# Patient Record
Sex: MALE | Race: WHITE | Employment: OTHER | URBAN - NONMETROPOLITAN AREA
[De-identification: names, ages, dates, MRNs, and addresses within clinical notes are randomized per-mention and may not be internally consistent; named-entity substitution may affect disease eponyms.]

---

## 2017-01-26 ENCOUNTER — ANTI-COAG VISIT (OUTPATIENT)
Dept: OTHER | Age: 67
End: 2017-01-26

## 2017-01-26 VITALS
BODY MASS INDEX: 38.06 KG/M2 | HEART RATE: 76 BPM | HEIGHT: 72 IN | WEIGHT: 281 LBS | DIASTOLIC BLOOD PRESSURE: 80 MMHG | SYSTOLIC BLOOD PRESSURE: 140 MMHG | OXYGEN SATURATION: 98 %

## 2017-01-26 DIAGNOSIS — I48.19 PERSISTENT ATRIAL FIBRILLATION (HCC): ICD-10-CM

## 2017-01-26 LAB — POC INR: 1.9 (ref 0.8–1.2)

## 2017-01-26 PROCEDURE — G8427 DOCREV CUR MEDS BY ELIG CLIN: HCPCS | Performed by: NURSE PRACTITIONER

## 2017-01-26 PROCEDURE — 3017F COLORECTAL CA SCREEN DOC REV: CPT | Performed by: NURSE PRACTITIONER

## 2017-01-26 PROCEDURE — G8419 CALC BMI OUT NRM PARAM NOF/U: HCPCS | Performed by: NURSE PRACTITIONER

## 2017-01-26 PROCEDURE — 99213 OFFICE O/P EST LOW 20 MIN: CPT | Performed by: NURSE PRACTITIONER

## 2017-01-26 PROCEDURE — G8484 FLU IMMUNIZE NO ADMIN: HCPCS | Performed by: NURSE PRACTITIONER

## 2017-01-26 PROCEDURE — 85610 PROTHROMBIN TIME: CPT | Performed by: NURSE PRACTITIONER

## 2017-01-26 PROCEDURE — 1123F ACP DISCUSS/DSCN MKR DOCD: CPT | Performed by: NURSE PRACTITIONER

## 2017-01-26 PROCEDURE — 1036F TOBACCO NON-USER: CPT | Performed by: NURSE PRACTITIONER

## 2017-01-26 PROCEDURE — 4040F PNEUMOC VAC/ADMIN/RCVD: CPT | Performed by: NURSE PRACTITIONER

## 2017-01-26 ASSESSMENT — ENCOUNTER SYMPTOMS
CONSTIPATION: 0
SHORTNESS OF BREATH: 0
DIARRHEA: 0
BLOOD IN STOOL: 0

## 2017-01-30 ENCOUNTER — OFFICE VISIT (OUTPATIENT)
Dept: OTHER | Age: 67
End: 2017-01-30

## 2017-01-30 VITALS — BODY MASS INDEX: 38.52 KG/M2 | WEIGHT: 284 LBS

## 2017-01-30 DIAGNOSIS — E11.69 DIABETES MELLITUS TYPE 2 IN OBESE (HCC): Primary | ICD-10-CM

## 2017-01-30 DIAGNOSIS — E66.9 DIABETES MELLITUS TYPE 2 IN OBESE (HCC): Primary | ICD-10-CM

## 2017-01-30 PROCEDURE — G0108 DIAB MANAGE TRN  PER INDIV: HCPCS | Performed by: REGISTERED NURSE

## 2017-02-07 ENCOUNTER — OFFICE VISIT (OUTPATIENT)
Dept: OTHER | Age: 67
End: 2017-02-07

## 2017-02-07 DIAGNOSIS — E66.9 DIABETES MELLITUS TYPE 2 IN OBESE (HCC): Primary | ICD-10-CM

## 2017-02-07 DIAGNOSIS — E11.69 DIABETES MELLITUS TYPE 2 IN OBESE (HCC): Primary | ICD-10-CM

## 2017-02-07 PROCEDURE — G0109 DIAB MANAGE TRN IND/GROUP: HCPCS | Performed by: REGISTERED NURSE

## 2017-02-15 ENCOUNTER — ANTI-COAG VISIT (OUTPATIENT)
Dept: OTHER | Age: 67
End: 2017-02-15

## 2017-02-15 VITALS
DIASTOLIC BLOOD PRESSURE: 81 MMHG | BODY MASS INDEX: 38.79 KG/M2 | HEART RATE: 96 BPM | WEIGHT: 286 LBS | SYSTOLIC BLOOD PRESSURE: 143 MMHG

## 2017-02-15 DIAGNOSIS — I48.20 ATRIAL FIBRILLATION, CHRONIC (HCC): ICD-10-CM

## 2017-02-15 DIAGNOSIS — I48.19 PERSISTENT ATRIAL FIBRILLATION (HCC): ICD-10-CM

## 2017-02-15 LAB — POC INR: 2.2 (ref 0.8–1.2)

## 2017-02-15 PROCEDURE — G8427 DOCREV CUR MEDS BY ELIG CLIN: HCPCS | Performed by: NURSE PRACTITIONER

## 2017-02-15 PROCEDURE — G8417 CALC BMI ABV UP PARAM F/U: HCPCS | Performed by: NURSE PRACTITIONER

## 2017-02-15 PROCEDURE — 85610 PROTHROMBIN TIME: CPT | Performed by: NURSE PRACTITIONER

## 2017-02-15 PROCEDURE — 99999 PR OFFICE/OUTPT VISIT,PROCEDURE ONLY: CPT | Performed by: NURSE PRACTITIONER

## 2017-02-15 PROCEDURE — 4040F PNEUMOC VAC/ADMIN/RCVD: CPT | Performed by: NURSE PRACTITIONER

## 2017-02-15 PROCEDURE — 3017F COLORECTAL CA SCREEN DOC REV: CPT | Performed by: NURSE PRACTITIONER

## 2017-02-15 ASSESSMENT — ENCOUNTER SYMPTOMS
DIARRHEA: 0
CONSTIPATION: 0
SHORTNESS OF BREATH: 0
BLOOD IN STOOL: 0

## 2017-02-16 DIAGNOSIS — R39.198 DIFFICULTY URINATING: ICD-10-CM

## 2017-02-16 RX ORDER — TAMSULOSIN HYDROCHLORIDE 0.4 MG/1
0.4 CAPSULE ORAL DAILY
Qty: 90 CAPSULE | Refills: 3 | Status: SHIPPED | OUTPATIENT
Start: 2017-02-16 | End: 2018-02-03 | Stop reason: SDUPTHER

## 2017-03-01 ENCOUNTER — ANTI-COAG VISIT (OUTPATIENT)
Dept: OTHER | Age: 67
End: 2017-03-01

## 2017-03-01 VITALS
DIASTOLIC BLOOD PRESSURE: 76 MMHG | SYSTOLIC BLOOD PRESSURE: 132 MMHG | HEART RATE: 72 BPM | BODY MASS INDEX: 39.09 KG/M2 | WEIGHT: 288.2 LBS

## 2017-03-01 DIAGNOSIS — I48.19 PERSISTENT ATRIAL FIBRILLATION (HCC): ICD-10-CM

## 2017-03-01 DIAGNOSIS — I48.20 ATRIAL FIBRILLATION, CHRONIC (HCC): ICD-10-CM

## 2017-03-01 LAB — POC INR: 2 (ref 0.8–1.2)

## 2017-03-01 PROCEDURE — 99212 OFFICE O/P EST SF 10 MIN: CPT | Performed by: NURSE PRACTITIONER

## 2017-03-01 PROCEDURE — G8484 FLU IMMUNIZE NO ADMIN: HCPCS | Performed by: NURSE PRACTITIONER

## 2017-03-01 PROCEDURE — 1123F ACP DISCUSS/DSCN MKR DOCD: CPT | Performed by: NURSE PRACTITIONER

## 2017-03-01 PROCEDURE — G8427 DOCREV CUR MEDS BY ELIG CLIN: HCPCS | Performed by: NURSE PRACTITIONER

## 2017-03-01 PROCEDURE — G8417 CALC BMI ABV UP PARAM F/U: HCPCS | Performed by: NURSE PRACTITIONER

## 2017-03-01 PROCEDURE — 3017F COLORECTAL CA SCREEN DOC REV: CPT | Performed by: NURSE PRACTITIONER

## 2017-03-01 PROCEDURE — 85610 PROTHROMBIN TIME: CPT | Performed by: NURSE PRACTITIONER

## 2017-03-01 PROCEDURE — 4040F PNEUMOC VAC/ADMIN/RCVD: CPT | Performed by: NURSE PRACTITIONER

## 2017-03-01 PROCEDURE — 1036F TOBACCO NON-USER: CPT | Performed by: NURSE PRACTITIONER

## 2017-03-01 ASSESSMENT — ENCOUNTER SYMPTOMS
BLOOD IN STOOL: 0
SHORTNESS OF BREATH: 0
DIARRHEA: 0
CONSTIPATION: 0

## 2017-03-20 DIAGNOSIS — I48.19 PERSISTENT ATRIAL FIBRILLATION (HCC): Primary | ICD-10-CM

## 2017-03-22 ENCOUNTER — ANTI-COAG VISIT (OUTPATIENT)
Dept: OTHER | Age: 67
End: 2017-03-22

## 2017-03-22 VITALS
SYSTOLIC BLOOD PRESSURE: 142 MMHG | DIASTOLIC BLOOD PRESSURE: 69 MMHG | BODY MASS INDEX: 39.11 KG/M2 | WEIGHT: 288.4 LBS | HEART RATE: 90 BPM

## 2017-03-22 DIAGNOSIS — I48.19 PERSISTENT ATRIAL FIBRILLATION (HCC): ICD-10-CM

## 2017-03-22 LAB — POC INR: 2.4 (ref 0.8–1.2)

## 2017-03-22 ASSESSMENT — ENCOUNTER SYMPTOMS
SHORTNESS OF BREATH: 0
DIARRHEA: 0
CONSTIPATION: 0
BLOOD IN STOOL: 0

## 2017-03-30 ENCOUNTER — OFFICE VISIT (OUTPATIENT)
Dept: UROLOGY | Age: 67
End: 2017-03-30

## 2017-03-30 VITALS
WEIGHT: 282 LBS | SYSTOLIC BLOOD PRESSURE: 142 MMHG | BODY MASS INDEX: 38.19 KG/M2 | HEIGHT: 72 IN | DIASTOLIC BLOOD PRESSURE: 88 MMHG

## 2017-03-30 DIAGNOSIS — E66.9 DIABETES MELLITUS TYPE 2 IN OBESE (HCC): Primary | ICD-10-CM

## 2017-03-30 DIAGNOSIS — R35.0 FREQUENCY OF URINATION: Primary | ICD-10-CM

## 2017-03-30 DIAGNOSIS — R31.0 GROSS HEMATURIA: ICD-10-CM

## 2017-03-30 DIAGNOSIS — E11.69 DIABETES MELLITUS TYPE 2 IN OBESE (HCC): Primary | ICD-10-CM

## 2017-03-30 LAB
BILIRUBIN URINE: NEGATIVE
BLOOD URINE, POC: ABNORMAL
CHARACTER, URINE: CLEAR
COLOR, URINE: YELLOW
GLUCOSE URINE: 500 MG/DL
KETONES, URINE: NEGATIVE
LEUKOCYTE CLUMPS, URINE: ABNORMAL
NITRITE, URINE: NEGATIVE
PH, URINE: 7
POST VOID RESIDUAL (PVR): 0 ML
PROTEIN, URINE: ABNORMAL MG/DL
SPECIFIC GRAVITY, URINE: 1.02 (ref 1–1.03)
UROBILINOGEN, URINE: 0.2 EU/DL

## 2017-03-30 PROCEDURE — 51798 US URINE CAPACITY MEASURE: CPT | Performed by: NURSE PRACTITIONER

## 2017-03-30 PROCEDURE — G8427 DOCREV CUR MEDS BY ELIG CLIN: HCPCS | Performed by: NURSE PRACTITIONER

## 2017-03-30 PROCEDURE — 99213 OFFICE O/P EST LOW 20 MIN: CPT | Performed by: NURSE PRACTITIONER

## 2017-03-30 PROCEDURE — 4040F PNEUMOC VAC/ADMIN/RCVD: CPT | Performed by: NURSE PRACTITIONER

## 2017-03-30 PROCEDURE — G8417 CALC BMI ABV UP PARAM F/U: HCPCS | Performed by: NURSE PRACTITIONER

## 2017-03-30 PROCEDURE — 1123F ACP DISCUSS/DSCN MKR DOCD: CPT | Performed by: NURSE PRACTITIONER

## 2017-03-30 PROCEDURE — 81003 URINALYSIS AUTO W/O SCOPE: CPT | Performed by: NURSE PRACTITIONER

## 2017-03-30 PROCEDURE — 1036F TOBACCO NON-USER: CPT | Performed by: NURSE PRACTITIONER

## 2017-03-30 PROCEDURE — 3017F COLORECTAL CA SCREEN DOC REV: CPT | Performed by: NURSE PRACTITIONER

## 2017-03-30 PROCEDURE — G8484 FLU IMMUNIZE NO ADMIN: HCPCS | Performed by: NURSE PRACTITIONER

## 2017-04-01 LAB
ORGANISM: ABNORMAL
URINE CULTURE, ROUTINE: ABNORMAL

## 2017-04-06 ENCOUNTER — TELEPHONE (OUTPATIENT)
Dept: UROLOGY | Age: 67
End: 2017-04-06

## 2017-04-17 ENCOUNTER — PROCEDURE VISIT (OUTPATIENT)
Dept: UROLOGY | Age: 67
End: 2017-04-17

## 2017-04-17 ENCOUNTER — TELEPHONE (OUTPATIENT)
Dept: UROLOGY | Age: 67
End: 2017-04-17

## 2017-04-17 VITALS
HEIGHT: 72 IN | SYSTOLIC BLOOD PRESSURE: 122 MMHG | BODY MASS INDEX: 38.6 KG/M2 | DIASTOLIC BLOOD PRESSURE: 70 MMHG | WEIGHT: 285 LBS

## 2017-04-17 DIAGNOSIS — R31.0 GROSS HEMATURIA: Primary | ICD-10-CM

## 2017-04-17 LAB
BILIRUBIN URINE: NEGATIVE
BLOOD URINE, POC: NEGATIVE
CHARACTER, URINE: CLEAR
COLOR, URINE: YELLOW
GLUCOSE URINE: NEGATIVE MG/DL
KETONES, URINE: NEGATIVE
LEUKOCYTE CLUMPS, URINE: NEGATIVE
NITRITE, URINE: NEGATIVE
PH, URINE: 7.5
PROTEIN, URINE: NEGATIVE MG/DL
SPECIFIC GRAVITY, URINE: 1.02 (ref 1–1.03)
UROBILINOGEN, URINE: 1 EU/DL

## 2017-04-17 PROCEDURE — 81003 URINALYSIS AUTO W/O SCOPE: CPT | Performed by: UROLOGY

## 2017-04-17 PROCEDURE — 52281 CYSTOSCOPY AND TREATMENT: CPT | Performed by: UROLOGY

## 2017-04-17 PROCEDURE — 1036F TOBACCO NON-USER: CPT | Performed by: UROLOGY

## 2017-04-17 PROCEDURE — 99999 PR OFFICE/OUTPT VISIT,PROCEDURE ONLY: CPT | Performed by: UROLOGY

## 2017-04-19 ENCOUNTER — ANTI-COAG VISIT (OUTPATIENT)
Dept: OTHER | Age: 67
End: 2017-04-19

## 2017-04-19 VITALS
HEART RATE: 82 BPM | WEIGHT: 287.9 LBS | SYSTOLIC BLOOD PRESSURE: 142 MMHG | DIASTOLIC BLOOD PRESSURE: 90 MMHG | BODY MASS INDEX: 39.05 KG/M2

## 2017-04-19 DIAGNOSIS — I48.19 PERSISTENT ATRIAL FIBRILLATION (HCC): ICD-10-CM

## 2017-04-19 LAB — POC INR: 1.8 (ref 0.8–1.2)

## 2017-04-19 ASSESSMENT — ENCOUNTER SYMPTOMS
SHORTNESS OF BREATH: 0
BLOOD IN STOOL: 0
CONSTIPATION: 0
DIARRHEA: 0

## 2017-04-21 ENCOUNTER — TELEPHONE (OUTPATIENT)
Dept: UROLOGY | Age: 67
End: 2017-04-21

## 2017-04-25 ENCOUNTER — TELEPHONE (OUTPATIENT)
Dept: CARDIOLOGY | Age: 67
End: 2017-04-25

## 2017-04-25 ENCOUNTER — OFFICE VISIT (OUTPATIENT)
Dept: CARDIOLOGY | Age: 67
End: 2017-04-25

## 2017-04-25 VITALS
HEART RATE: 76 BPM | SYSTOLIC BLOOD PRESSURE: 124 MMHG | BODY MASS INDEX: 39.01 KG/M2 | DIASTOLIC BLOOD PRESSURE: 72 MMHG | HEIGHT: 72 IN | WEIGHT: 288 LBS

## 2017-04-25 DIAGNOSIS — I48.19 PERSISTENT ATRIAL FIBRILLATION (HCC): Primary | ICD-10-CM

## 2017-04-25 DIAGNOSIS — Z01.810 PREOP CARDIOVASCULAR EXAM: ICD-10-CM

## 2017-04-25 PROCEDURE — 4040F PNEUMOC VAC/ADMIN/RCVD: CPT | Performed by: INTERNAL MEDICINE

## 2017-04-25 PROCEDURE — 93000 ELECTROCARDIOGRAM COMPLETE: CPT | Performed by: INTERNAL MEDICINE

## 2017-04-25 PROCEDURE — G8427 DOCREV CUR MEDS BY ELIG CLIN: HCPCS | Performed by: INTERNAL MEDICINE

## 2017-04-25 PROCEDURE — 1123F ACP DISCUSS/DSCN MKR DOCD: CPT | Performed by: INTERNAL MEDICINE

## 2017-04-25 PROCEDURE — 99213 OFFICE O/P EST LOW 20 MIN: CPT | Performed by: INTERNAL MEDICINE

## 2017-04-25 PROCEDURE — 1036F TOBACCO NON-USER: CPT | Performed by: INTERNAL MEDICINE

## 2017-04-25 PROCEDURE — G8417 CALC BMI ABV UP PARAM F/U: HCPCS | Performed by: INTERNAL MEDICINE

## 2017-04-25 PROCEDURE — 3017F COLORECTAL CA SCREEN DOC REV: CPT | Performed by: INTERNAL MEDICINE

## 2017-04-26 ENCOUNTER — TELEPHONE (OUTPATIENT)
Dept: UROLOGY | Age: 67
End: 2017-04-26

## 2017-04-26 ENCOUNTER — ANTI-COAG VISIT (OUTPATIENT)
Dept: OTHER | Age: 67
End: 2017-04-26

## 2017-04-26 DIAGNOSIS — I48.19 PERSISTENT ATRIAL FIBRILLATION (HCC): ICD-10-CM

## 2017-05-10 ENCOUNTER — TELEPHONE (OUTPATIENT)
Dept: CARDIOLOGY | Age: 67
End: 2017-05-10

## 2017-05-11 ENCOUNTER — ANTI-COAG VISIT (OUTPATIENT)
Dept: OTHER | Age: 67
End: 2017-05-11

## 2017-05-11 ENCOUNTER — PROCEDURE VISIT (OUTPATIENT)
Dept: UROLOGY | Age: 67
End: 2017-05-11

## 2017-05-11 VITALS
WEIGHT: 285.9 LBS | HEART RATE: 87 BPM | SYSTOLIC BLOOD PRESSURE: 155 MMHG | DIASTOLIC BLOOD PRESSURE: 92 MMHG | BODY MASS INDEX: 38.78 KG/M2

## 2017-05-11 VITALS
DIASTOLIC BLOOD PRESSURE: 78 MMHG | SYSTOLIC BLOOD PRESSURE: 130 MMHG | BODY MASS INDEX: 38.33 KG/M2 | WEIGHT: 283 LBS | HEIGHT: 72 IN

## 2017-05-11 DIAGNOSIS — I48.19 PERSISTENT ATRIAL FIBRILLATION (HCC): ICD-10-CM

## 2017-05-11 DIAGNOSIS — N20.0 KIDNEY STONE: Primary | ICD-10-CM

## 2017-05-11 LAB
BILIRUBIN URINE: NEGATIVE
BLOOD URINE, POC: ABNORMAL
CHARACTER, URINE: CLEAR
COLOR, URINE: YELLOW
GLUCOSE URINE: NEGATIVE MG/DL
KETONES, URINE: NEGATIVE
LEUKOCYTE CLUMPS, URINE: ABNORMAL
NITRITE, URINE: NEGATIVE
PH, URINE: 6
POC INR: 2.1 (ref 0.8–1.2)
POC INR: NORMAL (ref 0.8–1.2)
PROTEIN, URINE: 30 MG/DL
SPECIFIC GRAVITY, URINE: 1.02 (ref 1–1.03)
UROBILINOGEN, URINE: 0.2 EU/DL

## 2017-05-11 PROCEDURE — 99999 PR OFFICE/OUTPT VISIT,PROCEDURE ONLY: CPT | Performed by: UROLOGY

## 2017-05-11 PROCEDURE — 1036F TOBACCO NON-USER: CPT | Performed by: UROLOGY

## 2017-05-11 PROCEDURE — 81003 URINALYSIS AUTO W/O SCOPE: CPT | Performed by: UROLOGY

## 2017-05-11 PROCEDURE — 52310 CYSTOSCOPY AND TREATMENT: CPT | Performed by: UROLOGY

## 2017-05-11 ASSESSMENT — ENCOUNTER SYMPTOMS
DIARRHEA: 0
SHORTNESS OF BREATH: 0
BLOOD IN STOOL: 0
CONSTIPATION: 0

## 2017-05-16 ENCOUNTER — TELEPHONE (OUTPATIENT)
Dept: OTHER | Age: 67
End: 2017-05-16

## 2017-05-17 ENCOUNTER — ANTI-COAG VISIT (OUTPATIENT)
Dept: OTHER | Age: 67
End: 2017-05-17

## 2017-05-17 VITALS
HEART RATE: 77 BPM | SYSTOLIC BLOOD PRESSURE: 139 MMHG | BODY MASS INDEX: 38.92 KG/M2 | WEIGHT: 287 LBS | DIASTOLIC BLOOD PRESSURE: 96 MMHG

## 2017-05-17 DIAGNOSIS — I48.19 PERSISTENT ATRIAL FIBRILLATION (HCC): ICD-10-CM

## 2017-05-17 LAB — POC INR: 2.1 (ref 0.8–1.2)

## 2017-05-17 ASSESSMENT — ENCOUNTER SYMPTOMS
BLOOD IN STOOL: 0
DIARRHEA: 0
SHORTNESS OF BREATH: 0
CONSTIPATION: 0

## 2017-05-30 ENCOUNTER — ANTI-COAG VISIT (OUTPATIENT)
Dept: OTHER | Age: 67
End: 2017-05-30

## 2017-05-30 VITALS
BODY MASS INDEX: 38.11 KG/M2 | SYSTOLIC BLOOD PRESSURE: 134 MMHG | WEIGHT: 281 LBS | DIASTOLIC BLOOD PRESSURE: 64 MMHG | HEART RATE: 75 BPM

## 2017-05-30 DIAGNOSIS — I48.19 PERSISTENT ATRIAL FIBRILLATION (HCC): ICD-10-CM

## 2017-05-30 LAB — POC INR: 1.5 (ref 0.8–1.2)

## 2017-05-30 ASSESSMENT — ENCOUNTER SYMPTOMS
SHORTNESS OF BREATH: 0
CONSTIPATION: 0
DIARRHEA: 0
BLOOD IN STOOL: 0

## 2017-05-31 ENCOUNTER — TELEPHONE (OUTPATIENT)
Dept: UROLOGY | Age: 67
End: 2017-05-31

## 2017-06-01 ENCOUNTER — ANTI-COAG VISIT (OUTPATIENT)
Dept: OTHER | Age: 67
End: 2017-06-01

## 2017-06-01 VITALS
SYSTOLIC BLOOD PRESSURE: 114 MMHG | WEIGHT: 278 LBS | DIASTOLIC BLOOD PRESSURE: 62 MMHG | BODY MASS INDEX: 37.7 KG/M2 | HEART RATE: 89 BPM

## 2017-06-01 DIAGNOSIS — N20.0 KIDNEY STONE: Primary | ICD-10-CM

## 2017-06-01 DIAGNOSIS — I48.19 PERSISTENT ATRIAL FIBRILLATION (HCC): ICD-10-CM

## 2017-06-01 LAB — POC INR: 2.8 (ref 0.8–1.2)

## 2017-06-01 ASSESSMENT — ENCOUNTER SYMPTOMS
BLOOD IN STOOL: 0
DIARRHEA: 0
CONSTIPATION: 0
SHORTNESS OF BREATH: 0

## 2017-06-05 RX ORDER — METOPROLOL TARTRATE 50 MG/1
TABLET, FILM COATED ORAL
Qty: 180 TABLET | Refills: 1 | Status: SHIPPED | OUTPATIENT
Start: 2017-06-05 | End: 2017-12-05 | Stop reason: SDUPTHER

## 2017-06-07 ENCOUNTER — ANTI-COAG VISIT (OUTPATIENT)
Dept: OTHER | Age: 67
End: 2017-06-07

## 2017-06-07 VITALS
HEART RATE: 73 BPM | SYSTOLIC BLOOD PRESSURE: 146 MMHG | DIASTOLIC BLOOD PRESSURE: 80 MMHG | BODY MASS INDEX: 38.52 KG/M2 | WEIGHT: 284 LBS

## 2017-06-07 DIAGNOSIS — I48.19 PERSISTENT ATRIAL FIBRILLATION (HCC): ICD-10-CM

## 2017-06-07 LAB — POC INR: 2.5 (ref 0.8–1.2)

## 2017-06-07 ASSESSMENT — ENCOUNTER SYMPTOMS
DIARRHEA: 0
CONSTIPATION: 0
BLOOD IN STOOL: 0
SHORTNESS OF BREATH: 0

## 2017-06-27 ENCOUNTER — TELEPHONE (OUTPATIENT)
Dept: UROLOGY | Age: 67
End: 2017-06-27

## 2017-06-28 ENCOUNTER — ANTI-COAG VISIT (OUTPATIENT)
Dept: OTHER | Age: 67
End: 2017-06-28

## 2017-06-28 VITALS
HEART RATE: 76 BPM | WEIGHT: 293.2 LBS | BODY MASS INDEX: 39.77 KG/M2 | DIASTOLIC BLOOD PRESSURE: 83 MMHG | SYSTOLIC BLOOD PRESSURE: 151 MMHG

## 2017-06-28 DIAGNOSIS — I48.19 PERSISTENT ATRIAL FIBRILLATION (HCC): ICD-10-CM

## 2017-06-28 LAB — POC INR: 2.4 (ref 0.8–1.2)

## 2017-06-28 ASSESSMENT — ENCOUNTER SYMPTOMS
BLOOD IN STOOL: 0
CONSTIPATION: 0
SHORTNESS OF BREATH: 0
DIARRHEA: 0

## 2017-07-05 ENCOUNTER — TELEPHONE (OUTPATIENT)
Dept: UROLOGY | Age: 67
End: 2017-07-05

## 2017-07-06 ENCOUNTER — NURSE TRIAGE (OUTPATIENT)
Dept: ADMINISTRATIVE | Age: 67
End: 2017-07-06

## 2017-07-07 ENCOUNTER — TELEPHONE (OUTPATIENT)
Dept: UROLOGY | Age: 67
End: 2017-07-07

## 2017-07-07 DIAGNOSIS — R35.0 URINARY FREQUENCY: Primary | ICD-10-CM

## 2017-07-10 ENCOUNTER — NURSE ONLY (OUTPATIENT)
Dept: UROLOGY | Age: 67
End: 2017-07-10

## 2017-07-10 VITALS
BODY MASS INDEX: 38.6 KG/M2 | DIASTOLIC BLOOD PRESSURE: 68 MMHG | HEIGHT: 72 IN | SYSTOLIC BLOOD PRESSURE: 134 MMHG | WEIGHT: 285 LBS

## 2017-07-10 DIAGNOSIS — R39.15 URINARY URGENCY: ICD-10-CM

## 2017-07-10 DIAGNOSIS — R35.0 URINARY FREQUENCY: ICD-10-CM

## 2017-07-10 DIAGNOSIS — N39.0 URINARY TRACT INFECTION, SITE UNSPECIFIED: Primary | ICD-10-CM

## 2017-07-10 LAB
BILIRUBIN URINE: ABNORMAL
BLOOD URINE, POC: ABNORMAL
CHARACTER, URINE: CLEAR
COLOR, URINE: YELLOW
GLUCOSE URINE: NEGATIVE MG/DL
KETONES, URINE: NEGATIVE
LEUKOCYTE CLUMPS, URINE: ABNORMAL
NITRITE, URINE: POSITIVE
PH, URINE: 6
PROTEIN, URINE: 100 MG/DL
SPECIFIC GRAVITY, URINE: 1.02 (ref 1–1.03)
UROBILINOGEN, URINE: 0.2 EU/DL

## 2017-07-10 PROCEDURE — 99213 OFFICE O/P EST LOW 20 MIN: CPT | Performed by: NURSE PRACTITIONER

## 2017-07-10 PROCEDURE — 81003 URINALYSIS AUTO W/O SCOPE: CPT | Performed by: NURSE PRACTITIONER

## 2017-07-10 RX ORDER — CIPROFLOXACIN 500 MG/1
500 TABLET, FILM COATED ORAL 2 TIMES DAILY
Qty: 20 TABLET | Refills: 0 | Status: SHIPPED | OUTPATIENT
Start: 2017-07-10 | End: 2017-07-20

## 2017-07-12 LAB
ORGANISM: ABNORMAL
URINE CULTURE, ROUTINE: ABNORMAL

## 2017-08-02 ENCOUNTER — HOSPITAL ENCOUNTER (OUTPATIENT)
Dept: PHARMACY | Age: 67
Setting detail: THERAPIES SERIES
Discharge: HOME | End: 2017-08-02
Payer: MEDICARE

## 2017-08-02 VITALS
DIASTOLIC BLOOD PRESSURE: 72 MMHG | WEIGHT: 293 LBS | SYSTOLIC BLOOD PRESSURE: 143 MMHG | HEART RATE: 90 BPM | BODY MASS INDEX: 39.74 KG/M2

## 2017-08-02 DIAGNOSIS — I48.19 PERSISTENT ATRIAL FIBRILLATION (HCC): ICD-10-CM

## 2017-08-02 LAB — POC INR: 2.2 (ref 0.8–1.2)

## 2017-08-02 PROCEDURE — 99211 OFF/OP EST MAY X REQ PHY/QHP: CPT

## 2017-08-02 PROCEDURE — 85610 PROTHROMBIN TIME: CPT

## 2017-08-02 PROCEDURE — 36416 COLLJ CAPILLARY BLOOD SPEC: CPT

## 2017-08-02 ASSESSMENT — ENCOUNTER SYMPTOMS
SHORTNESS OF BREATH: 0
BLOOD IN STOOL: 0
DIARRHEA: 0
CONSTIPATION: 0

## 2017-08-30 ENCOUNTER — HOSPITAL ENCOUNTER (OUTPATIENT)
Dept: PHARMACY | Age: 67
Setting detail: THERAPIES SERIES
Discharge: HOME | End: 2017-08-30
Payer: MEDICARE

## 2017-08-30 VITALS
HEART RATE: 93 BPM | SYSTOLIC BLOOD PRESSURE: 152 MMHG | DIASTOLIC BLOOD PRESSURE: 79 MMHG | BODY MASS INDEX: 39.47 KG/M2 | WEIGHT: 291 LBS

## 2017-08-30 DIAGNOSIS — I48.19 PERSISTENT ATRIAL FIBRILLATION (HCC): ICD-10-CM

## 2017-08-30 LAB — POC INR: 3 (ref 0.8–1.2)

## 2017-08-30 PROCEDURE — 36416 COLLJ CAPILLARY BLOOD SPEC: CPT

## 2017-08-30 PROCEDURE — 99211 OFF/OP EST MAY X REQ PHY/QHP: CPT

## 2017-08-30 PROCEDURE — 85610 PROTHROMBIN TIME: CPT

## 2017-08-30 ASSESSMENT — ENCOUNTER SYMPTOMS
SHORTNESS OF BREATH: 0
DIARRHEA: 0
BLOOD IN STOOL: 0
CONSTIPATION: 0

## 2017-09-27 ENCOUNTER — HOSPITAL ENCOUNTER (OUTPATIENT)
Dept: PHARMACY | Age: 67
Setting detail: THERAPIES SERIES
Discharge: HOME | End: 2017-09-27
Payer: MEDICARE

## 2017-09-27 VITALS
DIASTOLIC BLOOD PRESSURE: 74 MMHG | SYSTOLIC BLOOD PRESSURE: 149 MMHG | WEIGHT: 296.4 LBS | BODY MASS INDEX: 40.2 KG/M2 | HEART RATE: 70 BPM

## 2017-09-27 DIAGNOSIS — I48.19 PERSISTENT ATRIAL FIBRILLATION (HCC): ICD-10-CM

## 2017-09-27 LAB — POC INR: 2.4 (ref 0.8–1.2)

## 2017-09-27 PROCEDURE — 99211 OFF/OP EST MAY X REQ PHY/QHP: CPT

## 2017-09-27 PROCEDURE — 36416 COLLJ CAPILLARY BLOOD SPEC: CPT

## 2017-09-27 PROCEDURE — 85610 PROTHROMBIN TIME: CPT

## 2017-09-27 ASSESSMENT — ENCOUNTER SYMPTOMS
BLOOD IN STOOL: 0
CONSTIPATION: 0
SHORTNESS OF BREATH: 0
DIARRHEA: 0

## 2017-10-17 ENCOUNTER — OFFICE VISIT (OUTPATIENT)
Dept: UROLOGY | Age: 67
End: 2017-10-17
Payer: MEDICARE

## 2017-10-17 VITALS
HEIGHT: 72 IN | WEIGHT: 300.4 LBS | DIASTOLIC BLOOD PRESSURE: 87 MMHG | SYSTOLIC BLOOD PRESSURE: 145 MMHG | BODY MASS INDEX: 40.69 KG/M2

## 2017-10-17 DIAGNOSIS — N39.0 RECURRENT UTI: Primary | ICD-10-CM

## 2017-10-17 LAB
BILIRUBIN URINE: NEGATIVE
BLOOD URINE, POC: NORMAL
CHARACTER, URINE: CLEAR
COLOR, URINE: YELLOW
GLUCOSE URINE: NEGATIVE MG/DL
KETONES, URINE: NEGATIVE
LEUKOCYTE CLUMPS, URINE: NEGATIVE
NITRITE, URINE: NEGATIVE
PH, URINE: 7
PROTEIN, URINE: NORMAL MG/DL
SPECIFIC GRAVITY, URINE: 1.02 (ref 1–1.03)
UROBILINOGEN, URINE: 0.2 EU/DL

## 2017-10-17 PROCEDURE — 4040F PNEUMOC VAC/ADMIN/RCVD: CPT | Performed by: NURSE PRACTITIONER

## 2017-10-17 PROCEDURE — 81003 URINALYSIS AUTO W/O SCOPE: CPT | Performed by: NURSE PRACTITIONER

## 2017-10-17 PROCEDURE — G8484 FLU IMMUNIZE NO ADMIN: HCPCS | Performed by: NURSE PRACTITIONER

## 2017-10-17 PROCEDURE — 99213 OFFICE O/P EST LOW 20 MIN: CPT | Performed by: NURSE PRACTITIONER

## 2017-10-17 PROCEDURE — G8417 CALC BMI ABV UP PARAM F/U: HCPCS | Performed by: NURSE PRACTITIONER

## 2017-10-17 PROCEDURE — 1123F ACP DISCUSS/DSCN MKR DOCD: CPT | Performed by: NURSE PRACTITIONER

## 2017-10-17 PROCEDURE — 3017F COLORECTAL CA SCREEN DOC REV: CPT | Performed by: NURSE PRACTITIONER

## 2017-10-17 PROCEDURE — G8427 DOCREV CUR MEDS BY ELIG CLIN: HCPCS | Performed by: NURSE PRACTITIONER

## 2017-10-17 PROCEDURE — 1036F TOBACCO NON-USER: CPT | Performed by: NURSE PRACTITIONER

## 2017-10-17 NOTE — PROGRESS NOTES
Allergies       finasteride (PROSCAR) 5 MG tablet   Take 5 mg by mouth daily Prostate      Sodium Chloride-Sodium Bicarb (CLASSIC NETI POT SINUS WASH) 2300-700 MG KIT by Nasal route as needed Indications: Congestion of the Nasal Sinuses  1 kit     hydrochlorothiazide (HYDRODIURIL) 25 MG tablet Take 25 mg by mouth daily. Indications: High Blood Pressure      digoxin (LANOXIN) 0.25 MG tablet Take by mouth daily Indications: Congestive Heart Failure 1/2 tab      lovastatin (MEVACOR) 20 MG tablet Take 20 mg by mouth nightly. Indications: High Amount of Fats in the Blood       No current facility-administered medications for this visit. Past Medical History  Meghan Renee  has a past medical history of Arthritis; Atrial fibrillation ([facility] Cancer ([facility] Diabetes mellitus ([facility] History of esophagogastroduodenoscopy (EGD); History of kidney stones; Hyperlipidemia; and Hypertension. Past Surgical History  The patient  has a past surgical history that includes knee surgery (1988???); Rotator cuff repair (2000?? ); Lithotripsy (1990?? -  2016??); other surgical history (5/26/2016); Cardiac catheterization (1995??); Cholecystectomy (1984??); Colonoscopy; skin biopsy; Cystoscopy (05/02/2017); Ureteroscopy (Left, 05/02/2017); and Kidney stone surgery (Left, 05/02/2017). Family History  This patient's family history includes Arthritis in his mother; Diabetes in his father and mother; Heart Disease in his mother; High Blood Pressure in his father and mother; Stroke in his mother. Social History  Meghan Renee  reports that he has never smoked. He has never used smokeless tobacco. He reports that he does not drink alcohol or use drugs. Subjective:     Review of Systems  No problems with ears, nose or throat. No problems with eyes. No chest pain, shortness of breath, abdominal pain, extremity pain or weakness, and no neurological deficits. No rashes.  symptoms per HPI.   The remainder of the review of symptoms is

## 2017-10-31 ENCOUNTER — OFFICE VISIT (OUTPATIENT)
Dept: CARDIOLOGY CLINIC | Age: 67
End: 2017-10-31
Payer: MEDICARE

## 2017-10-31 VITALS
HEART RATE: 64 BPM | HEIGHT: 72 IN | WEIGHT: 297 LBS | DIASTOLIC BLOOD PRESSURE: 86 MMHG | BODY MASS INDEX: 40.23 KG/M2 | SYSTOLIC BLOOD PRESSURE: 136 MMHG

## 2017-10-31 DIAGNOSIS — I48.20 ATRIAL FIBRILLATION, CHRONIC (HCC): ICD-10-CM

## 2017-10-31 PROCEDURE — G8484 FLU IMMUNIZE NO ADMIN: HCPCS | Performed by: INTERNAL MEDICINE

## 2017-10-31 PROCEDURE — 1123F ACP DISCUSS/DSCN MKR DOCD: CPT | Performed by: INTERNAL MEDICINE

## 2017-10-31 PROCEDURE — G8427 DOCREV CUR MEDS BY ELIG CLIN: HCPCS | Performed by: INTERNAL MEDICINE

## 2017-10-31 PROCEDURE — G8417 CALC BMI ABV UP PARAM F/U: HCPCS | Performed by: INTERNAL MEDICINE

## 2017-10-31 PROCEDURE — 99213 OFFICE O/P EST LOW 20 MIN: CPT | Performed by: INTERNAL MEDICINE

## 2017-10-31 PROCEDURE — 3017F COLORECTAL CA SCREEN DOC REV: CPT | Performed by: INTERNAL MEDICINE

## 2017-10-31 PROCEDURE — 1036F TOBACCO NON-USER: CPT | Performed by: INTERNAL MEDICINE

## 2017-10-31 PROCEDURE — 4040F PNEUMOC VAC/ADMIN/RCVD: CPT | Performed by: INTERNAL MEDICINE

## 2017-10-31 RX ORDER — WARFARIN SODIUM 2.5 MG/1
TABLET ORAL
Qty: 90 TABLET | Refills: 3 | Status: CANCELLED | OUTPATIENT
Start: 2017-10-31

## 2017-10-31 RX ORDER — WARFARIN SODIUM 2.5 MG/1
2.5 TABLET ORAL EVERY EVENING
COMMUNITY
End: 2017-12-14 | Stop reason: SDUPTHER

## 2017-10-31 NOTE — PROGRESS NOTES
Paul Persaud is a 75 y.o. male is here for  atrial fibrillation doing very well has history of syncopal episode and does not do well with vasodilators and has had no chest pains . Intensity of the pain none  provocation of the pain n one  what relieves the pain none  where does  the pain migrates to none and no nausea no fever and chills and no sob with and without activity.  No evidence of swelling in legs no palpitations and no syncopal episodes and no dizziness ,no bleeding ,or urination  Problems ,no double visions ,no speech problems and no bowel problems or diarrhea and tolerating medications     Patient Active Problem List   Diagnosis    GI bleed not requiring more than 4 units of blood in 24 hours, ICU, or surgery    Osteoarthrosis of hip    Diabetes mellitus type 2 in obese ([facility]    Current use of long term anticoagulation    Essential hypertension, benign    Hyperlipidemia with target LDL less than 70    Chronic nonallergic rhinitis    Kidney stone    Vasovagal syncope    Persistent atrial fibrillation (HCC)    Preop cardiovascular exam     Past Medical History:   Diagnosis Date    Arthritis     Atrial fibrillation ([facility]     Cancer ([facility]     skin    Diabetes mellitus ([facility]     History of esophagogastroduodenoscopy (EGD) 6/7/16    History of kidney stones     Hyperlipidemia     Hypertension      Social History   Substance Use Topics    Smoking status: Never Smoker    Smokeless tobacco: Never Used    Alcohol use No     Allergies   Allergen Reactions    Sulfa Antibiotics Rash     Current Outpatient Prescriptions   Medication Sig Dispense Refill    warfarin (COUMADIN) 2.5 MG tablet Take 2.5 mg by mouth every evening      metoprolol tartrate (LOPRESSOR) 50 MG tablet take 1 tablet by mouth twice a day 180 tablet 1    docusate sodium (COLACE) 100 MG capsule Take 1 capsule by mouth daily as needed for Constipation 30 capsule 1    tamsulosin (FLOMAX) 0.4 MG capsule Take 1 capsule by absent   Thyroid palpation appears to be  Normal    FRANKY  And evaluated and within normal limits  And size of pupils is normal  HEENT  teeth appears to be symmetrical without poor dentition and gums  and palate appears to be healthy and  head is normal cephalic  Without signs of trauma and eyes are without trauma no conjunctiva and Iids retracting and not retracted ptosis and no ptosis and without  erythematous changes and  Nose is symmetrical  without drainage  and ears are  without tinnitus  and throat is clear   JUGULAR VEINS  are not elevated and tongue is mid line no masses presence and no li A waves present   LYMPHATICS are without adenopathies at least on exam   CHEST  No biventricular heaves and thrills and no right ventricular heaves and examination without signs of trauma and lesions  HEART not distant and ir regular rate and rhythm without   gallop signs and and no murmurs and systolic crescendo  Decrescendo  normal s1 and s2 sounds and no s3 and s4 split   Point of maximum intensity of the heartbeat  is at or displaced from the fifth intercostal space  LUNGS no   presence of intercostal retractions and no  use of the accessory muscles with a diaphragmatic movement present and not present pectus and pigeon chest and without wheezes and rhonchi and non diminished in all posterior lungs  and without rales  ABDOMEN abdominal bruit not present  And  No  Presency of  morbid obesity and with no    non distended without organomegaly and no rebound tenderness and bowel sound are present  all quadrants   NEUROLOGY all the cranial nerves are intact and no motor deficits  and no sensory deficits  MUSCULAR SKELETAL without signs of trauma and kyphosis and scoliosis and normal range of motion for all extremities  INTEGUMENTARY without tenting and skin rashes indentation and  dryness  NECK without lymph adenopathy   NEUROPSYCHIATRIC has no anxiety, no mood swings and depression  VASCULAR EXAM for carotid ,radial femoral arteries are  steady  and not diminished   Extremities no evidence of peripheral edema  And pulses are  normal    Assessment and plans    1. Atrial fibrillation, chronic ([address] controlled    Patient was advised to return in 6 to 10 months for follow up or sooner if there is any change in care. Patient is on anticoagulation and has accepted the potential risk of bleeding to hopefull decrease risk of embolic stroke.    he is considering his options regarding ablation therapy if he can come off the Coumadin  The other option is to consider watch him and in particular if he can come off Coumadin  So we have sent him to the EP physician to discuss these options with him  Digoxin level    patient was advised of the side effects of the medications and watch for any change in medical status if any of those side effects develop to call immediately and may consider  discontinuing the medicine after talking to us and  depending on the clinical scenario  Electronically signed by Ismael Holden DO on 10/31/2017 at 2:55 PM

## 2017-10-31 NOTE — PROGRESS NOTES
Pt here for 6 month check up     Denies chest pain, SOB or JULIANA    Continues to have palpitations with no change

## 2017-11-01 ENCOUNTER — HOSPITAL ENCOUNTER (OUTPATIENT)
Dept: PHARMACY | Age: 67
Setting detail: THERAPIES SERIES
Discharge: HOME | End: 2017-11-01
Payer: MEDICARE

## 2017-11-01 VITALS
DIASTOLIC BLOOD PRESSURE: 68 MMHG | SYSTOLIC BLOOD PRESSURE: 141 MMHG | HEART RATE: 85 BPM | BODY MASS INDEX: 40.06 KG/M2 | WEIGHT: 295.4 LBS

## 2017-11-01 DIAGNOSIS — I48.19 PERSISTENT ATRIAL FIBRILLATION (HCC): ICD-10-CM

## 2017-11-01 LAB — POC INR: 2.6 (ref 0.8–1.2)

## 2017-11-01 PROCEDURE — 36416 COLLJ CAPILLARY BLOOD SPEC: CPT

## 2017-11-01 PROCEDURE — 85610 PROTHROMBIN TIME: CPT

## 2017-11-01 PROCEDURE — 99211 OFF/OP EST MAY X REQ PHY/QHP: CPT

## 2017-11-01 RX ORDER — DIGOXIN 125 MCG
125 TABLET ORAL DAILY
COMMUNITY
Start: 2017-10-03 | End: 2018-06-19 | Stop reason: ALTCHOICE

## 2017-11-01 RX ORDER — IPRATROPIUM BROMIDE 42 UG/1
SPRAY, METERED NASAL SEE ADMIN INSTRUCTIONS
COMMUNITY
Start: 2017-09-07 | End: 2018-06-25 | Stop reason: ALTCHOICE

## 2017-11-01 ASSESSMENT — ENCOUNTER SYMPTOMS
DIARRHEA: 0
SHORTNESS OF BREATH: 0
BLOOD IN STOOL: 0
CONSTIPATION: 0

## 2017-11-01 NOTE — PROGRESS NOTES
The [facility]  Anticoagulation Clinic  [phone] (phone)  [phone] (fax)  Subjective:      Patient ID: Del Martinez is a 75 y.o. male. HPI  Has diagnosis of persistent atrial fib. , per Dr. Heavenly Jacobo referral - being seen for Warfarin monitoring (5 week visit). Correctly states dose/tablet strength. Has been eating more salad. Had high-dose flu vaccine 10/26. Review of Systems   Constitutional: Negative for activity change, appetite change and fatigue. HENT: Negative for nosebleeds. Respiratory: Negative for shortness of breath. Cardiovascular: Negative for chest pain and leg swelling. Gastrointestinal: Negative for blood in stool, constipation and diarrhea. Genitourinary: Negative for hematuria. Neurological: Negative for weakness, light-headedness and headaches. Hematological: Does not bruise/bleed easily. Objective:   Physical Exam   Constitutional: He is oriented to person, place, and time. He appears well-developed and well-nourished. Cardiovascular: Normal rate. Pulmonary/Chest: Effort normal.   Neurological: He is alert and oriented to person, place, and time. Psychiatric: He has a normal mood and affect. His behavior is normal.   Vitals reviewed. Assessment:      Lab Results   Component Value Date    INR 2.60 (H) 11/01/2017    INR 2.40 (H) 09/27/2017    INR 3.00 (H) 08/30/2017     INR therapeutic - goal 2-3. Recent Labs      11/01/17   1311   INR  2.60*         Plan:    POCT INR ordered/reviewed. Continue Coumadin 2.5 mg daily PO. Recheck INR 5 weeks. Patient reminded to call the Anticoagulation Clinic with any signs or symptoms of bleeding or with any medication changes. Patient given instructions utilizing the teach back method. Did not want printed instructions. RMA gave patient routine H&H order - doing other bloodwork in OP soon for physician. Discharged ambulatory in no apparent distress.

## 2017-11-07 ENCOUNTER — HOSPITAL ENCOUNTER (OUTPATIENT)
Age: 67
Discharge: HOME | End: 2017-11-07
Payer: MEDICARE

## 2017-11-07 DIAGNOSIS — I48.19 PERSISTENT ATRIAL FIBRILLATION (HCC): ICD-10-CM

## 2017-11-07 DIAGNOSIS — I48.20 ATRIAL FIBRILLATION, CHRONIC (HCC): ICD-10-CM

## 2017-11-07 LAB
DIGOXIN LEVEL: 1.1 NG/ML (ref 0.5–2)
HCT VFR BLD CALC: 45.2 % (ref 42–52)
HEMOGLOBIN: 15.3 GM/DL (ref 14–18)

## 2017-11-07 PROCEDURE — 85018 HEMOGLOBIN: CPT

## 2017-11-07 PROCEDURE — 36415 COLL VENOUS BLD VENIPUNCTURE: CPT

## 2017-11-07 PROCEDURE — 80162 ASSAY OF DIGOXIN TOTAL: CPT

## 2017-11-07 PROCEDURE — 85014 HEMATOCRIT: CPT

## 2017-12-05 ENCOUNTER — TELEPHONE (OUTPATIENT)
Dept: PHARMACY | Age: 67
End: 2017-12-05

## 2017-12-05 ENCOUNTER — OFFICE VISIT (OUTPATIENT)
Dept: CARDIOLOGY CLINIC | Age: 67
End: 2017-12-05
Payer: MEDICARE

## 2017-12-05 VITALS
BODY MASS INDEX: 40.9 KG/M2 | SYSTOLIC BLOOD PRESSURE: 171 MMHG | HEART RATE: 84 BPM | HEIGHT: 72 IN | WEIGHT: 302 LBS | DIASTOLIC BLOOD PRESSURE: 81 MMHG

## 2017-12-05 DIAGNOSIS — I48.19 PERSISTENT ATRIAL FIBRILLATION (HCC): Primary | ICD-10-CM

## 2017-12-05 PROCEDURE — 4040F PNEUMOC VAC/ADMIN/RCVD: CPT | Performed by: INTERNAL MEDICINE

## 2017-12-05 PROCEDURE — 1123F ACP DISCUSS/DSCN MKR DOCD: CPT | Performed by: INTERNAL MEDICINE

## 2017-12-05 PROCEDURE — G8417 CALC BMI ABV UP PARAM F/U: HCPCS | Performed by: INTERNAL MEDICINE

## 2017-12-05 PROCEDURE — 3017F COLORECTAL CA SCREEN DOC REV: CPT | Performed by: INTERNAL MEDICINE

## 2017-12-05 PROCEDURE — 93000 ELECTROCARDIOGRAM COMPLETE: CPT | Performed by: INTERNAL MEDICINE

## 2017-12-05 PROCEDURE — 99205 OFFICE O/P NEW HI 60 MIN: CPT | Performed by: INTERNAL MEDICINE

## 2017-12-05 PROCEDURE — G8427 DOCREV CUR MEDS BY ELIG CLIN: HCPCS | Performed by: INTERNAL MEDICINE

## 2017-12-05 PROCEDURE — G8484 FLU IMMUNIZE NO ADMIN: HCPCS | Performed by: INTERNAL MEDICINE

## 2017-12-05 PROCEDURE — 1036F TOBACCO NON-USER: CPT | Performed by: INTERNAL MEDICINE

## 2017-12-05 RX ORDER — METOPROLOL TARTRATE 50 MG/1
TABLET, FILM COATED ORAL
Qty: 180 TABLET | Refills: 1 | Status: SHIPPED | OUTPATIENT
Start: 2017-12-05 | End: 2018-06-08 | Stop reason: SDUPTHER

## 2017-12-05 ASSESSMENT — ENCOUNTER SYMPTOMS
WHEEZING: 0
LEFT EYE: 0
VOMITING: 0
RIGHT EYE: 0
ABDOMINAL PAIN: 0
COUGH: 0
DOUBLE VISION: 0
BACK PAIN: 0
NAUSEA: 0
SORE THROAT: 0
BLURRED VISION: 0
SHORTNESS OF BREATH: 0
DIARRHEA: 0
CONSTIPATION: 0

## 2017-12-05 NOTE — PROGRESS NOTES
[facility] [address] [place] [address], [facility]  Dept: [phone]  Dept Fax: [phone]      CARDIAC ELECTROPHYSIOLOGY  CONSULTATION    12/5/2017      REFERRING PROVIDER:  Dr. Anderson Avendaño:  I want to stop taking my coumadin  Chief Complaint   Patient presents with    New Patient     discuss watchman    Atrial Fibrillation       HPI:  HPI  The patient is a 76 y/o male with permanent atrial fibrillation for the past twenty years. His ventricular rate is well controlled and he is asymptomatic. He is on coumadin for chronic anticoagulation. The patient has a h/o GI bleeding secondary to a gastric ulcer 4 years ago. He did have successful electrocautery performed and has not had a recurrent bleed since then. However, the patient is not allowed to take any NSAIDS. The patient suffers from arthritis and wants to be able to take pain medications when needed. He would like to have the left atrial appendage occluding device as an alternative to continued anticoagulation. Dr. Nelida Homans is also concerned about the patient's h/o vasodepressor near syncope and his risk of falling. PMH:  History obtained from chart review and the patient. Past Medical History:   Diagnosis Date    Arthritis     Atrial fibrillation ([address]     Cancer ([address]     skin    Diabetes mellitus ([address]     History of esophagogastroduodenoscopy (EGD) 6/7/16    History of kidney stones     Hyperlipidemia     Hypertension        PSH:  Past Surgical History:   Procedure Laterality Date    CARDIAC CATHETERIZATION  1995??    no stents    CHOLECYSTECTOMY  1984??    COLONOSCOPY      last one 2015??    CYSTOSCOPY  05/02/2017    KIDNEY STONE SURGERY Left 05/02/2017    basket retrievel   [facility]? ??    arthroscopy, left    LITHOTRIPSY  1990?? -  2016??     kidney stones    OTHER SURGICAL HISTORY  5/26/2016    LEFT ESWL    ROTATOR CUFF REPAIR  2000??    right    SKIN BIOPSY      Prostate      Sodium Chloride-Sodium Bicarb (CLASSIC NETI POT SINUS WASH) 2300-700 MG KIT by Nasal route as needed Indications: Congestion of the Nasal Sinuses  1 kit     hydrochlorothiazide (HYDRODIURIL) 25 MG tablet Take 25 mg by mouth daily. Indications: High Blood Pressure      lovastatin (MEVACOR) 20 MG tablet Take 20 mg by mouth nightly. Indications: High Amount of Fats in the Blood       No current facility-administered medications for this visit. REVIEW OF SYSTEMS:    Review of Systems   Constitution: Negative for fever, weight gain and weight loss. HENT: Negative for hearing loss and sore throat. Eyes: Negative for blurred vision, double vision, vision loss in left eye and vision loss in right eye. Cardiovascular: Negative for chest pain, dyspnea on exertion, irregular heartbeat, near-syncope, palpitations and syncope. Respiratory: Negative for cough, shortness of breath and wheezing. Endocrine: Negative for cold intolerance, heat intolerance and polyuria. Hematologic/Lymphatic: Negative for bleeding problem. Does not bruise/bleed easily. Skin: Negative for dry skin, itching and rash. Musculoskeletal: Negative for arthritis, back pain, joint pain and myalgias. Gastrointestinal: Negative for abdominal pain, constipation, diarrhea, nausea and vomiting. Genitourinary: Negative for dysuria, frequency, hematuria and urgency. Neurological: Negative for dizziness, headaches, light-headedness, numbness, paresthesias, seizures and vertigo. Psychiatric/Behavioral: Negative for depression and memory loss. The patient is not nervous/anxious. PHYSICAL EXAM:  BP (!) 171/81   Pulse 84   Ht 6' 0.01\" (1.829 m)   Wt (!) 302 lb (137 kg)   BMI 40.95 kg/m²     Physical Exam   Constitutional: He is oriented to person, place, and time. No distress. HENT:   Head: Normocephalic and atraumatic. Head is without contusion.    Right Ear: Hearing and external ear normal. No decreased is warm and dry. No lesion and no rash noted. He is not diaphoretic. No cyanosis. Nails show no clubbing. Psychiatric: His speech is normal and behavior is normal. Judgment and thought content normal. His mood appears not anxious. His affect is not angry. Cognition and memory are normal. He does not exhibit a depressed mood. Nursing note and vitals reviewed. DIAGNOSTIC STUDIES & LABORATORY DATA:    I have personally reviewed and interpreted the results of the following diagnostic testing  CARDIAC HISTORY:    ECHO: 2013  Left ventricle:  Size was normal.  Systolic function was normal. Ejection fraction was estimated in the range of  50 % to 55 %. There were no regional wall motion abnormalities. Wall thickness was mildly increased. Concentric hypertrophy was present. Right ventricle: The ventricle was mildly dilated. Tricuspid valve: There was mild regurgitation. EC2017  Atrial fibrillation  STRESS TEST: 2014  IMPRESSIONS:   -  No evidence of ischemia is noted on this study. Bowel uptake  is noted in inferior wall both at rest and stress but worst during rest.  -  Normal study after maximal exercise. TILT TABLE TEST: 2016  CONCLUSION:   1. This is a positive test for producing the patient's symptoms of postural  orthostasis and near-syncopal episode with symptom production. 2. The patient remains in atrial fibrillation.       RECOMMENDATION:   1. Avoiding vasodilators. 2. Drink more fluids. 3. Avoid prolonged standing. 4. Increase salt intake.     Lab Results   Component Value Date    WBC 9.2 2017    HGB 15.3 2017    HCT 45.2 2017     2017    CHOL 121 2017    TRIG 124 2017    HDL 43 2017    LDLDIRECT 70 2014    ALT 10 (L) 2017    AST 19 2017     2017    K 4.4 2017     2017    CREATININE 0.9 2017    BUN 13 2017    CO2 30 2017    INR 2.60 (H)

## 2017-12-05 NOTE — PROGRESS NOTES
New Patient referred for A-Fib and to discuss the watchman. EKG done today. Denies cp, sob, dizziness. C/o palpitations and JULIANA.

## 2017-12-07 ENCOUNTER — HOSPITAL ENCOUNTER (OUTPATIENT)
Dept: GENERAL RADIOLOGY | Age: 67
Discharge: HOME | End: 2017-12-07
Payer: MEDICARE

## 2017-12-07 ENCOUNTER — HOSPITAL ENCOUNTER (OUTPATIENT)
Age: 67
Discharge: HOME | End: 2017-12-07
Payer: MEDICARE

## 2017-12-07 DIAGNOSIS — M79.672 LEFT FOOT PAIN: ICD-10-CM

## 2017-12-07 PROCEDURE — 73630 X-RAY EXAM OF FOOT: CPT

## 2017-12-11 ENCOUNTER — HOSPITAL ENCOUNTER (OUTPATIENT)
Age: 67
Discharge: HOME | End: 2017-12-11
Payer: MEDICARE

## 2017-12-11 ENCOUNTER — TELEPHONE (OUTPATIENT)
Dept: PHARMACY | Age: 67
End: 2017-12-11

## 2017-12-11 ENCOUNTER — TELEPHONE (OUTPATIENT)
Dept: UROLOGY | Age: 67
End: 2017-12-11

## 2017-12-11 DIAGNOSIS — R35.0 FREQUENCY OF URINATION: Primary | ICD-10-CM

## 2017-12-11 LAB
BACTERIA: ABNORMAL /HPF
BILIRUBIN URINE: NEGATIVE
BLOOD, URINE: ABNORMAL
CASTS 2: ABNORMAL /LPF
CASTS UA: ABNORMAL /LPF
CHARACTER, URINE: ABNORMAL
COLOR: YELLOW
CRYSTALS, UA: ABNORMAL
EPITHELIAL CELLS, UA: ABNORMAL /HPF
GLUCOSE URINE: NEGATIVE MG/DL
KETONES, URINE: NEGATIVE
LEUKOCYTE ESTERASE, URINE: ABNORMAL
MISCELLANEOUS 2: ABNORMAL
NITRITE, URINE: NEGATIVE
PH UA: 7
PROTEIN UA: 100
RBC URINE: ABNORMAL /HPF
RENAL EPITHELIAL, UA: ABNORMAL
SPECIFIC GRAVITY, URINE: 1.02 (ref 1–1.03)
UROBILINOGEN, URINE: 0.2 EU/DL
WBC UA: ABNORMAL /HPF
YEAST: ABNORMAL

## 2017-12-11 PROCEDURE — 87184 SC STD DISK METHOD PER PLATE: CPT

## 2017-12-11 PROCEDURE — 87186 SC STD MICRODIL/AGAR DIL: CPT

## 2017-12-11 PROCEDURE — 81001 URINALYSIS AUTO W/SCOPE: CPT

## 2017-12-11 PROCEDURE — 87086 URINE CULTURE/COLONY COUNT: CPT

## 2017-12-11 RX ORDER — CIPROFLOXACIN 500 MG/1
500 TABLET, FILM COATED ORAL 2 TIMES DAILY
Qty: 14 TABLET | Refills: 0 | Status: SHIPPED | OUTPATIENT
Start: 2017-12-11 | End: 2017-12-14 | Stop reason: ALTCHOICE

## 2017-12-11 NOTE — TELEPHONE ENCOUNTER
Brooklyn Cavazos called to say that he was put on Ciprofloxacen 500 mg 1 tab bid x 7 days. He was also put on Colcrys 0.6 mg 1 tab bid x 15 days (he has already taken for about a week). He does have an appointment 12/13/17. I instructed that he would only receive a call if his dosing for coumadin would change.

## 2017-12-11 NOTE — TELEPHONE ENCOUNTER
Patient thinks he has a UTI, burning, frequent urination, pain, he would like an antibiotic sent in to [facility] on [address], please advise to patient at [phone]

## 2017-12-11 NOTE — TELEPHONE ENCOUNTER
Called patient and advised that Kait Fairchild would prefer he give a Urine Specimen and than he  the antibiotic.  Patient Voices Understanding, stating he will go to [facility] and give the urine

## 2017-12-11 NOTE — TELEPHONE ENCOUNTER
Left message, per HIPAA, asking patient to return call to our office. We need to find out if he is able to get a urine rt. Reflex to culture done to evaluate the urine.

## 2017-12-11 NOTE — TELEPHONE ENCOUNTER
Patient called again asking for Kin Rising. He is a little frustrated he has waited a \"half a day\" with these symptoms. Please call him as soon as have any suggestions for him. Thank you!

## 2017-12-11 NOTE — TELEPHONE ENCOUNTER
Patient calling back. He states he has to work tomorrow and usually as soon as he starts on Cipro, the symptoms the burning and frequency stop.  I asked him if he would go and give a lab specimen, he prefers to start cipro, statin every time he has had an UTI he is treated with Cipro and it works for him

## 2017-12-13 ENCOUNTER — TELEPHONE (OUTPATIENT)
Dept: UROLOGY | Age: 67
End: 2017-12-13

## 2017-12-13 LAB
ORGANISM: ABNORMAL
URINE CULTURE REFLEX: ABNORMAL

## 2017-12-13 RX ORDER — DOXYCYCLINE HYCLATE 100 MG
100 TABLET ORAL 2 TIMES DAILY
Qty: 20 TABLET | Refills: 0 | Status: SHIPPED | OUTPATIENT
Start: 2017-12-13 | End: 2017-12-23

## 2017-12-13 NOTE — TELEPHONE ENCOUNTER
Please let patient know that his urine culture has finalized, Cipro is intermediate, may not be enough to eradicate his current infection. Would like him to start taking Doxycycline which I have sent to the pharmacy.  Thanks    [employer]

## 2017-12-14 ENCOUNTER — HOSPITAL ENCOUNTER (OUTPATIENT)
Dept: PHARMACY | Age: 67
Setting detail: THERAPIES SERIES
Discharge: HOME | End: 2017-12-14
Payer: MEDICARE

## 2017-12-14 DIAGNOSIS — I48.19 PERSISTENT ATRIAL FIBRILLATION (HCC): ICD-10-CM

## 2017-12-14 LAB — POC INR: 2.5 (ref 0.8–1.2)

## 2017-12-14 PROCEDURE — 85610 PROTHROMBIN TIME: CPT | Performed by: PHARMACIST

## 2017-12-14 PROCEDURE — 36416 COLLJ CAPILLARY BLOOD SPEC: CPT | Performed by: PHARMACIST

## 2017-12-14 PROCEDURE — 99212 OFFICE O/P EST SF 10 MIN: CPT | Performed by: PHARMACIST

## 2017-12-14 RX ORDER — COLCHICINE 0.6 MG/1
0.6 TABLET ORAL 2 TIMES DAILY
Status: ON HOLD | COMMUNITY
End: 2018-01-15 | Stop reason: ALTCHOICE

## 2017-12-14 RX ORDER — WARFARIN SODIUM 2.5 MG/1
TABLET ORAL
Qty: 30 TABLET | Refills: 11 | Status: SHIPPED | OUTPATIENT
Start: 2017-12-14 | End: 2018-01-23 | Stop reason: SDUPTHER

## 2017-12-14 NOTE — PROGRESS NOTES
[facility]  Anticoagulation Clinic  [phone] (phone)  [phone] (fax)  Mr. Paul Persaud is a 75 y.o.  male with history of Afib who presents today 1 week late for his 5 week appointmentfor anticoagulation monitoring and adjustment. Patient verifies current dosing regimen and tablet strength. No missed or extra doses. Patient denies s/s bleeding/bruising/swelling/SOB/chest pain  No blood in urine or stool. No dietary changes. Changes in medication/OTC agents/Herbals: Patient had been on Cipro and will now be on doxycycline for a week  No change in alcohol use or tobacco use. No change in activity level. Patient denies headaches/dizziness/lightheadedness/falls. No vomiting/diarrhea or acute illness. No Procedures scheduled in the future at this time. Lab Results   Component Value Date    INR 2.50 (H) 12/14/2017    INR 2.60 (H) 11/01/2017    INR 2.40 (H) 09/27/2017           Plan:  Continue Coumadin 2.5mg daily. Recheck INR 5 weeks. Patient reminded to call the Anticoagulation Clinic with any signs or symptoms of bleeding or with any medication changes. Patient given instructions utilizing the teach back method. Discharged ambulatory in no apparent distress. After visit summary printed and reviewed with patient.       Medications reviewed and updated on home medication list Yes    Influenza vaccine:     [] given    [] declined   [] received previously   [] plans to receive at a later time   [] refused    [x] documented in EPIC

## 2018-01-05 DIAGNOSIS — I48.20 ATRIAL FIBRILLATION, CHRONIC (HCC): Primary | ICD-10-CM

## 2018-01-15 ENCOUNTER — HOSPITAL ENCOUNTER (OUTPATIENT)
Dept: INPATIENT UNIT | Age: 68
Discharge: HOME HEALTH | End: 2018-01-15
Attending: INTERNAL MEDICINE | Admitting: INTERNAL MEDICINE
Payer: MEDICARE

## 2018-01-15 VITALS
BODY MASS INDEX: 39.28 KG/M2 | HEART RATE: 66 BPM | WEIGHT: 290 LBS | RESPIRATION RATE: 12 BRPM | HEIGHT: 72 IN | TEMPERATURE: 98.1 F | SYSTOLIC BLOOD PRESSURE: 143 MMHG | DIASTOLIC BLOOD PRESSURE: 81 MMHG | OXYGEN SATURATION: 95 %

## 2018-01-15 LAB
ALBUMIN SERPL-MCNC: 3.7 G/DL (ref 3.5–5.1)
ALP BLD-CCNC: 88 U/L (ref 38–126)
ALT SERPL-CCNC: 11 U/L (ref 11–66)
ANION GAP SERPL CALCULATED.3IONS-SCNC: 9 MEQ/L (ref 8–16)
AST SERPL-CCNC: 19 U/L (ref 5–40)
BILIRUB SERPL-MCNC: 0.5 MG/DL (ref 0.3–1.2)
BUN BLDV-MCNC: 11 MG/DL (ref 7–22)
CALCIUM SERPL-MCNC: 9.2 MG/DL (ref 8.5–10.5)
CHLORIDE BLD-SCNC: 102 MEQ/L (ref 98–111)
CO2: 33 MEQ/L (ref 23–33)
CREAT SERPL-MCNC: 0.9 MG/DL (ref 0.4–1.2)
GFR SERPL CREATININE-BSD FRML MDRD: 84 ML/MIN/1.73M2
GLUCOSE BLD-MCNC: 125 MG/DL (ref 70–108)
HCT VFR BLD CALC: 42.2 % (ref 42–52)
HEMOGLOBIN: 13.9 GM/DL (ref 14–18)
INR BLD: 3.02 (ref 0.85–1.13)
LV EF: 50 %
LVEF MODALITY: NORMAL
MCH RBC QN AUTO: 28.1 PG (ref 27–31)
MCHC RBC AUTO-ENTMCNC: 32.9 GM/DL (ref 33–37)
MCV RBC AUTO: 85.6 FL (ref 80–94)
PDW BLD-RTO: 14.7 % (ref 11.5–14.5)
PLATELET # BLD: 267 THOU/MM3 (ref 130–400)
PMV BLD AUTO: 9.1 MCM (ref 7.4–10.4)
POTASSIUM SERPL-SCNC: 4.2 MEQ/L (ref 3.5–5.2)
RBC # BLD: 4.93 MILL/MM3 (ref 4.7–6.1)
SODIUM BLD-SCNC: 144 MEQ/L (ref 135–145)
TOTAL PROTEIN: 7 G/DL (ref 6.1–8)
WBC # BLD: 9 THOU/MM3 (ref 4.8–10.8)

## 2018-01-15 PROCEDURE — 2500000003 HC RX 250 WO HCPCS

## 2018-01-15 PROCEDURE — 36415 COLL VENOUS BLD VENIPUNCTURE: CPT

## 2018-01-15 PROCEDURE — 6360000002 HC RX W HCPCS

## 2018-01-15 PROCEDURE — 2580000003 HC RX 258: Performed by: PHYSICIAN ASSISTANT

## 2018-01-15 PROCEDURE — 93325 DOPPLER ECHO COLOR FLOW MAPG: CPT

## 2018-01-15 PROCEDURE — 80053 COMPREHEN METABOLIC PANEL: CPT

## 2018-01-15 PROCEDURE — 93312 ECHO TRANSESOPHAGEAL: CPT

## 2018-01-15 PROCEDURE — 6360000002 HC RX W HCPCS: Performed by: INTERNAL MEDICINE

## 2018-01-15 PROCEDURE — 85027 COMPLETE CBC AUTOMATED: CPT

## 2018-01-15 PROCEDURE — 6370000000 HC RX 637 (ALT 250 FOR IP)

## 2018-01-15 PROCEDURE — A6446 CONFORM BAND S W>=3" <5"/YD: HCPCS

## 2018-01-15 PROCEDURE — 93320 DOPPLER ECHO COMPLETE: CPT

## 2018-01-15 PROCEDURE — 6360000002 HC RX W HCPCS: Performed by: PHYSICIAN ASSISTANT

## 2018-01-15 PROCEDURE — 85610 PROTHROMBIN TIME: CPT

## 2018-01-15 RX ORDER — FLUMAZENIL 0.1 MG/ML
0.4 INJECTION, SOLUTION INTRAVENOUS ONCE
Status: COMPLETED | OUTPATIENT
Start: 2018-01-15 | End: 2018-01-15

## 2018-01-15 RX ORDER — SODIUM CHLORIDE 0.9 % (FLUSH) 0.9 %
10 SYRINGE (ML) INJECTION PRN
Status: DISCONTINUED | OUTPATIENT
Start: 2018-01-15 | End: 2018-01-15 | Stop reason: HOSPADM

## 2018-01-15 RX ORDER — NALOXONE HYDROCHLORIDE 0.4 MG/ML
INJECTION, SOLUTION INTRAMUSCULAR; INTRAVENOUS; SUBCUTANEOUS
Status: COMPLETED
Start: 2018-01-15 | End: 2018-01-15

## 2018-01-15 RX ORDER — SODIUM CHLORIDE 0.9 % (FLUSH) 0.9 %
10 SYRINGE (ML) INJECTION PRN
Status: DISCONTINUED | OUTPATIENT
Start: 2018-01-15 | End: 2018-01-15 | Stop reason: SDUPTHER

## 2018-01-15 RX ORDER — SODIUM CHLORIDE 9 MG/ML
INJECTION, SOLUTION INTRAVENOUS CONTINUOUS
Status: DISCONTINUED | OUTPATIENT
Start: 2018-01-15 | End: 2018-01-15 | Stop reason: SDUPTHER

## 2018-01-15 RX ORDER — SODIUM CHLORIDE 0.9 % (FLUSH) 0.9 %
10 SYRINGE (ML) INJECTION EVERY 12 HOURS SCHEDULED
Status: DISCONTINUED | OUTPATIENT
Start: 2018-01-15 | End: 2018-01-15 | Stop reason: SDUPTHER

## 2018-01-15 RX ORDER — FENTANYL CITRATE 50 UG/ML
100 INJECTION, SOLUTION INTRAMUSCULAR; INTRAVENOUS ONCE
Status: COMPLETED | OUTPATIENT
Start: 2018-01-15 | End: 2018-01-15

## 2018-01-15 RX ORDER — FENTANYL CITRATE 50 UG/ML
100 INJECTION, SOLUTION INTRAMUSCULAR; INTRAVENOUS ONCE
Status: DISCONTINUED | OUTPATIENT
Start: 2018-01-15 | End: 2018-01-15 | Stop reason: HOSPADM

## 2018-01-15 RX ORDER — SODIUM CHLORIDE 0.9 % (FLUSH) 0.9 %
10 SYRINGE (ML) INJECTION EVERY 12 HOURS SCHEDULED
Status: DISCONTINUED | OUTPATIENT
Start: 2018-01-15 | End: 2018-01-15 | Stop reason: HOSPADM

## 2018-01-15 RX ORDER — FLUMAZENIL 0.1 MG/ML
INJECTION, SOLUTION INTRAVENOUS
Status: COMPLETED
Start: 2018-01-15 | End: 2018-01-15

## 2018-01-15 RX ORDER — NALOXONE HYDROCHLORIDE 0.4 MG/ML
0.4 INJECTION, SOLUTION INTRAMUSCULAR; INTRAVENOUS; SUBCUTANEOUS ONCE
Status: COMPLETED | OUTPATIENT
Start: 2018-01-15 | End: 2018-01-15

## 2018-01-15 RX ORDER — SODIUM CHLORIDE 9 MG/ML
INJECTION, SOLUTION INTRAVENOUS CONTINUOUS
Status: DISCONTINUED | OUTPATIENT
Start: 2018-01-15 | End: 2018-01-15 | Stop reason: HOSPADM

## 2018-01-15 RX ORDER — MIDAZOLAM HYDROCHLORIDE 1 MG/ML
10 INJECTION INTRAMUSCULAR; INTRAVENOUS ONCE
Status: COMPLETED | OUTPATIENT
Start: 2018-01-15 | End: 2018-01-15

## 2018-01-15 RX ADMIN — FLUMAZENIL 0.4 MG: 0.1 INJECTION, SOLUTION INTRAVENOUS at 09:44

## 2018-01-15 RX ADMIN — SODIUM CHLORIDE: 9 INJECTION, SOLUTION INTRAVENOUS at 07:38

## 2018-01-15 RX ADMIN — NALOXONE HYDROCHLORIDE 0.4 MG: 0.4 INJECTION, SOLUTION INTRAMUSCULAR; INTRAVENOUS; SUBCUTANEOUS at 09:45

## 2018-01-15 RX ADMIN — MIDAZOLAM 2 MG: 1 INJECTION INTRAMUSCULAR; INTRAVENOUS at 09:25

## 2018-01-15 RX ADMIN — FENTANYL CITRATE 50 MCG: 50 INJECTION, SOLUTION INTRAMUSCULAR; INTRAVENOUS at 09:25

## 2018-01-15 ASSESSMENT — PAIN SCALES - GENERAL: PAINLEVEL_OUTOF10: 0

## 2018-01-15 NOTE — PROGRESS NOTES
Pt admitted to  2E12 ambulatory for IGGY. Pt NPO. Patient accompanied by wife  Vital signs obtained. Assessment and data collection initiated. Oriented to room. Policies and procedures for 2E explained   All questions answered with no further questions at this time. [facility] coumadin clinic doses his coumadin, [facility] coumadin clinic. States he has an  Appointment tomorrow.

## 2018-01-19 ENCOUNTER — NURSE ONLY (OUTPATIENT)
Dept: UROLOGY | Age: 68
End: 2018-01-19
Payer: MEDICARE

## 2018-01-19 VITALS — BODY MASS INDEX: 39.28 KG/M2 | WEIGHT: 290 LBS | HEIGHT: 72 IN

## 2018-01-19 DIAGNOSIS — R35.0 FREQUENCY OF URINATION: Primary | ICD-10-CM

## 2018-01-19 LAB
BILIRUBIN URINE: NEGATIVE
BLOOD URINE, POC: NEGATIVE
CHARACTER, URINE: CLEAR
COLOR, URINE: YELLOW
GLUCOSE URINE: NEGATIVE MG/DL
KETONES, URINE: NEGATIVE
LEUKOCYTE CLUMPS, URINE: NEGATIVE
NITRITE, URINE: NEGATIVE
PH, URINE: 7
POST VOID RESIDUAL (PVR): 0 ML
PROTEIN, URINE: NORMAL MG/DL
SPECIFIC GRAVITY, URINE: 1.02 (ref 1–1.03)
UROBILINOGEN, URINE: 0.2 EU/DL

## 2018-01-19 PROCEDURE — 99211 OFF/OP EST MAY X REQ PHY/QHP: CPT | Performed by: NURSE PRACTITIONER

## 2018-01-19 PROCEDURE — 51798 US URINE CAPACITY MEASURE: CPT | Performed by: NURSE PRACTITIONER

## 2018-01-19 PROCEDURE — 81003 URINALYSIS AUTO W/O SCOPE: CPT | Performed by: NURSE PRACTITIONER

## 2018-01-19 NOTE — PROGRESS NOTES
Pt states he is urinating every 2-3 minutes and his urine is cloudy. Pt states this is the 5th infection he's had in the last year. Pt's PVR was 0. Pt denies any fever or chills. Pt states his symptoms started last Friday and he had Cipro left over from the last infection so he started taking it again, pt stated he took 5 pills of the Cipro and it helped his symptoms. Pt states he has not had any symptoms since taking the 5 Cipro pills last week.

## 2018-01-21 LAB
ORGANISM: ABNORMAL
URINE CULTURE, ROUTINE: ABNORMAL

## 2018-01-23 ENCOUNTER — HOSPITAL ENCOUNTER (OUTPATIENT)
Dept: PHARMACY | Age: 68
Setting detail: THERAPIES SERIES
Discharge: HOME | End: 2018-01-23
Payer: MEDICARE

## 2018-01-23 DIAGNOSIS — I48.19 PERSISTENT ATRIAL FIBRILLATION (HCC): ICD-10-CM

## 2018-01-23 LAB — POC INR: 2.4 (ref 0.8–1.2)

## 2018-01-23 PROCEDURE — 36416 COLLJ CAPILLARY BLOOD SPEC: CPT

## 2018-01-23 PROCEDURE — 85610 PROTHROMBIN TIME: CPT

## 2018-01-23 PROCEDURE — 99212 OFFICE O/P EST SF 10 MIN: CPT

## 2018-01-23 RX ORDER — WARFARIN SODIUM 2.5 MG/1
TABLET ORAL
Qty: 90 TABLET | Refills: 3 | Status: SHIPPED | OUTPATIENT
Start: 2018-01-23 | End: 2018-01-31 | Stop reason: SDUPTHER

## 2018-01-23 RX ORDER — WARFARIN SODIUM 2.5 MG/1
2.5 TABLET ORAL EVERY EVENING
COMMUNITY
End: 2018-03-06 | Stop reason: SDUPTHER

## 2018-01-24 DIAGNOSIS — I48.20 ATRIAL FIBRILLATION, CHRONIC (HCC): Primary | ICD-10-CM

## 2018-01-31 ENCOUNTER — OFFICE VISIT (OUTPATIENT)
Dept: CARDIOLOGY CLINIC | Age: 68
End: 2018-01-31
Payer: MEDICARE

## 2018-01-31 VITALS
HEART RATE: 79 BPM | DIASTOLIC BLOOD PRESSURE: 79 MMHG | WEIGHT: 300.6 LBS | BODY MASS INDEX: 40.71 KG/M2 | HEIGHT: 72 IN | SYSTOLIC BLOOD PRESSURE: 134 MMHG

## 2018-01-31 DIAGNOSIS — I48.19 PERSISTENT ATRIAL FIBRILLATION (HCC): Primary | ICD-10-CM

## 2018-01-31 DIAGNOSIS — E66.9 DIABETES MELLITUS TYPE 2 IN OBESE (HCC): ICD-10-CM

## 2018-01-31 DIAGNOSIS — E11.69 DIABETES MELLITUS TYPE 2 IN OBESE (HCC): ICD-10-CM

## 2018-01-31 DIAGNOSIS — E78.5 HYPERLIPIDEMIA WITH TARGET LDL LESS THAN 70: ICD-10-CM

## 2018-01-31 DIAGNOSIS — I10 ESSENTIAL HYPERTENSION, BENIGN: ICD-10-CM

## 2018-01-31 PROCEDURE — 3046F HEMOGLOBIN A1C LEVEL >9.0%: CPT | Performed by: PHYSICIAN ASSISTANT

## 2018-01-31 PROCEDURE — G8427 DOCREV CUR MEDS BY ELIG CLIN: HCPCS | Performed by: PHYSICIAN ASSISTANT

## 2018-01-31 PROCEDURE — 1123F ACP DISCUSS/DSCN MKR DOCD: CPT | Performed by: PHYSICIAN ASSISTANT

## 2018-01-31 PROCEDURE — G8484 FLU IMMUNIZE NO ADMIN: HCPCS | Performed by: PHYSICIAN ASSISTANT

## 2018-01-31 PROCEDURE — 1036F TOBACCO NON-USER: CPT | Performed by: PHYSICIAN ASSISTANT

## 2018-01-31 PROCEDURE — 99213 OFFICE O/P EST LOW 20 MIN: CPT | Performed by: PHYSICIAN ASSISTANT

## 2018-01-31 PROCEDURE — 3017F COLORECTAL CA SCREEN DOC REV: CPT | Performed by: PHYSICIAN ASSISTANT

## 2018-01-31 PROCEDURE — G8417 CALC BMI ABV UP PARAM F/U: HCPCS | Performed by: PHYSICIAN ASSISTANT

## 2018-01-31 PROCEDURE — 4040F PNEUMOC VAC/ADMIN/RCVD: CPT | Performed by: PHYSICIAN ASSISTANT

## 2018-01-31 NOTE — PROGRESS NOTES
Patient here to follow-up after in hospital for IGGY. Patient is being worked up for [employer]. Patient denies having any chest pain, SOB, dizziness, lightheadedness or JULIANA. Patient has intermittent palpitations. Patient wanting to switch to Dr. Pratik Valdez since Dr. Letty Gamino leaving, appt switched. [facility] PROFESSIONAL SERVICES  [facility] [place]   [facility]   [address] [address]   Dept: [phone]   Dept Fax: [phone]: [phone]      Chief Complaint   Patient presents with    Follow-Up from [facility] done for work-up for cardioversion    Atrial Fibrillation     Follow-up recent IGGY. Patient states since his IGGY he has not had any issues with swallowing. He has been worked up for Derotha Lambert procedure. Patient would like to follow with Dr Pratik Valdez after Dr. Kelly Renae. He denies any chest pain, shortness of breath palpitations. Cardiologist:  Dr. Seth Garcia:   No fever, no chills, No fatigue or weight loss  Pulmonary:    No dyspnea, no wheezing  Cardiac:    Denies recent chest pain   GI:     No nausea or vomiting, no abdominal pain  Neuro:    No dizziness or light headedness  Musculoskeletal:  No recent active issues  Extremities:   No edema, good peripheral pulses      Past Medical History:   Diagnosis Date    Arthritis     [facility] ([facility]     Cancer ([facility]     skin    Diabetes mellitus ([facility]     History of esophagogastroduodenoscopy (EGD) 6/7/16    History of kidney stones     Hyperlipidemia     Hypertension        Allergies   Allergen Reactions    Sulfa Antibiotics Rash       Current Outpatient Prescriptions   Medication Sig Dispense Refill    warfarin (COUMADIN) 2.5 MG tablet Take by mouth every evening Dosed by [facility].       metoprolol tartrate (LOPRESSOR) 50 MG tablet take 1 tablet by mouth twice a day 180 tablet 1    digoxin (LANOXIN) 125 MCG tablet Take 125 mcg by mouth daily       ipratropium

## 2018-02-03 DIAGNOSIS — R39.198 DIFFICULTY URINATING: ICD-10-CM

## 2018-02-05 ENCOUNTER — HOSPITAL ENCOUNTER (OUTPATIENT)
Dept: CT IMAGING | Age: 68
Discharge: HOME | End: 2018-02-05
Payer: MEDICARE

## 2018-02-05 ENCOUNTER — HOSPITAL ENCOUNTER (OUTPATIENT)
Age: 68
Discharge: HOME | End: 2018-02-05
Payer: MEDICARE

## 2018-02-05 DIAGNOSIS — I48.20 ATRIAL FIBRILLATION, CHRONIC (HCC): ICD-10-CM

## 2018-02-05 LAB — PROSTATE SPECIFIC ANTIGEN: 0.17 NG/ML (ref 0–1)

## 2018-02-05 PROCEDURE — 75572 CT HRT W/3D IMAGE: CPT

## 2018-02-05 PROCEDURE — 36415 COLL VENOUS BLD VENIPUNCTURE: CPT

## 2018-02-05 PROCEDURE — 6360000004 HC RX CONTRAST MEDICATION: Performed by: NURSE PRACTITIONER

## 2018-02-05 PROCEDURE — G0103 PSA SCREENING: HCPCS

## 2018-02-05 RX ORDER — TAMSULOSIN HYDROCHLORIDE 0.4 MG/1
CAPSULE ORAL
Qty: 90 CAPSULE | Refills: 3 | Status: SHIPPED | OUTPATIENT
Start: 2018-02-05 | End: 2021-02-11

## 2018-02-05 RX ADMIN — IOPAMIDOL 80 ML: 755 INJECTION, SOLUTION INTRAVENOUS at 09:50

## 2018-02-06 ENCOUNTER — PREP FOR PROCEDURE (OUTPATIENT)
Dept: CARDIOLOGY CLINIC | Age: 68
End: 2018-02-06

## 2018-02-06 RX ORDER — SODIUM CHLORIDE 0.9 % (FLUSH) 0.9 %
10 SYRINGE (ML) INJECTION PRN
Status: CANCELLED | OUTPATIENT
Start: 2018-02-06

## 2018-02-23 ENCOUNTER — HOSPITAL ENCOUNTER (INPATIENT)
Dept: INPATIENT UNIT | Age: 68
LOS: 1 days | Discharge: HOME | DRG: 287 | End: 2018-02-24
Attending: INTERNAL MEDICINE | Admitting: INTERNAL MEDICINE
Payer: MEDICARE

## 2018-02-23 ENCOUNTER — ANESTHESIA (OUTPATIENT)
Dept: CARDIAC CATH/INVASIVE PROCEDURES | Age: 68
DRG: 287 | End: 2018-02-23
Payer: MEDICARE

## 2018-02-23 ENCOUNTER — APPOINTMENT (OUTPATIENT)
Dept: CARDIAC CATH/INVASIVE PROCEDURES | Age: 68
DRG: 287 | End: 2018-02-23
Payer: MEDICARE

## 2018-02-23 ENCOUNTER — ANESTHESIA EVENT (OUTPATIENT)
Dept: CARDIAC CATH/INVASIVE PROCEDURES | Age: 68
DRG: 287 | End: 2018-02-23
Payer: MEDICARE

## 2018-02-23 VITALS — SYSTOLIC BLOOD PRESSURE: 150 MMHG | OXYGEN SATURATION: 100 % | DIASTOLIC BLOOD PRESSURE: 89 MMHG

## 2018-02-23 PROBLEM — I48.21 PERMANENT ATRIAL FIBRILLATION (HCC): Status: ACTIVE | Noted: 2018-02-23

## 2018-02-23 PROBLEM — I48.91 ATRIAL FIBRILLATION (HCC): Status: ACTIVE | Noted: 2018-02-23

## 2018-02-23 LAB
ABO: NORMAL
ANION GAP SERPL CALCULATED.3IONS-SCNC: 12 MEQ/L (ref 8–16)
ANTIBODY SCREEN: NORMAL
APTT: 39.9 SECONDS (ref 22–38)
BUN BLDV-MCNC: 10 MG/DL (ref 7–22)
CALCIUM SERPL-MCNC: 9.5 MG/DL (ref 8.5–10.5)
CHLORIDE BLD-SCNC: 101 MEQ/L (ref 98–111)
CO2: 30 MEQ/L (ref 23–33)
CREAT SERPL-MCNC: 0.8 MG/DL (ref 0.4–1.2)
GFR SERPL CREATININE-BSD FRML MDRD: > 90 ML/MIN/1.73M2
GLUCOSE BLD-MCNC: 102 MG/DL (ref 70–108)
GLUCOSE BLD-MCNC: 170 MG/DL (ref 70–108)
GLUCOSE BLD-MCNC: 225 MG/DL (ref 70–108)
HCT VFR BLD CALC: 43 % (ref 42–52)
HEMOGLOBIN: 14.8 GM/DL (ref 14–18)
INR BLD: 2.13 (ref 0.85–1.13)
MCH RBC QN AUTO: 29.4 PG (ref 27–31)
MCHC RBC AUTO-ENTMCNC: 34.3 GM/DL (ref 33–37)
MCV RBC AUTO: 85.6 FL (ref 80–94)
PDW BLD-RTO: 14.5 % (ref 11.5–14.5)
PLATELET # BLD: 242 THOU/MM3 (ref 130–400)
PMV BLD AUTO: 8.5 FL (ref 7.4–10.4)
POC ACTIVATED CLOTTING TIME KAOLIN: 164 SECONDS (ref 1–150)
POC ACTIVATED CLOTTING TIME KAOLIN: 285 SECONDS (ref 1–150)
POC ACTIVATED CLOTTING TIME KAOLIN: 290 SECONDS (ref 1–150)
POC ACTIVATED CLOTTING TIME KAOLIN: 318 SECONDS (ref 1–150)
POTASSIUM REFLEX MAGNESIUM: 4.1 MEQ/L (ref 3.5–5.2)
RBC # BLD: 5.02 MILL/MM3 (ref 4.7–6.1)
RH FACTOR: NORMAL
SODIUM BLD-SCNC: 143 MEQ/L (ref 135–145)
WBC # BLD: 7.7 THOU/MM3 (ref 4.8–10.8)

## 2018-02-23 PROCEDURE — 3700000000 HC ANESTHESIA ATTENDED CARE

## 2018-02-23 PROCEDURE — 2140000000 HC CCU INTERMEDIATE R&B

## 2018-02-23 PROCEDURE — 3700000001 HC ADD 15 MINUTES (ANESTHESIA)

## 2018-02-23 PROCEDURE — 2500000003 HC RX 250 WO HCPCS: Performed by: ANESTHESIOLOGY

## 2018-02-23 PROCEDURE — B2111ZZ FLUOROSCOPY OF MULTIPLE CORONARY ARTERIES USING LOW OSMOLAR CONTRAST: ICD-10-PCS | Performed by: INTERNAL MEDICINE

## 2018-02-23 PROCEDURE — 85730 THROMBOPLASTIN TIME PARTIAL: CPT

## 2018-02-23 PROCEDURE — 86901 BLOOD TYPING SEROLOGIC RH(D): CPT

## 2018-02-23 PROCEDURE — 85027 COMPLETE CBC AUTOMATED: CPT

## 2018-02-23 PROCEDURE — 6370000000 HC RX 637 (ALT 250 FOR IP)

## 2018-02-23 PROCEDURE — 86900 BLOOD TYPING SEROLOGIC ABO: CPT

## 2018-02-23 PROCEDURE — C1760 CLOSURE DEV, VASC: HCPCS

## 2018-02-23 PROCEDURE — 6360000002 HC RX W HCPCS: Performed by: INTERNAL MEDICINE

## 2018-02-23 PROCEDURE — 2500000003 HC RX 250 WO HCPCS: Performed by: NURSE ANESTHETIST, CERTIFIED REGISTERED

## 2018-02-23 PROCEDURE — 86850 RBC ANTIBODY SCREEN: CPT

## 2018-02-23 PROCEDURE — 2720000010 HC SURG SUPPLY STERILE

## 2018-02-23 PROCEDURE — 6370000000 HC RX 637 (ALT 250 FOR IP): Performed by: INTERNAL MEDICINE

## 2018-02-23 PROCEDURE — 02JA3ZZ INSPECTION OF HEART, PERCUTANEOUS APPROACH: ICD-10-PCS | Performed by: INTERNAL MEDICINE

## 2018-02-23 PROCEDURE — 6360000002 HC RX W HCPCS: Performed by: NURSE ANESTHETIST, CERTIFIED REGISTERED

## 2018-02-23 PROCEDURE — 93355 ECHO TRANSESOPHAGEAL (TEE): CPT

## 2018-02-23 PROCEDURE — 2580000003 HC RX 258: Performed by: INTERNAL MEDICINE

## 2018-02-23 PROCEDURE — 82948 REAGENT STRIP/BLOOD GLUCOSE: CPT

## 2018-02-23 PROCEDURE — C1769 GUIDE WIRE: HCPCS

## 2018-02-23 PROCEDURE — 85610 PROTHROMBIN TIME: CPT

## 2018-02-23 PROCEDURE — C1894 INTRO/SHEATH, NON-LASER: HCPCS

## 2018-02-23 PROCEDURE — 85347 COAGULATION TIME ACTIVATED: CPT

## 2018-02-23 PROCEDURE — 36415 COLL VENOUS BLD VENIPUNCTURE: CPT

## 2018-02-23 PROCEDURE — 7100000000 HC PACU RECOVERY - FIRST 15 MIN

## 2018-02-23 PROCEDURE — 7100000001 HC PACU RECOVERY - ADDTL 15 MIN

## 2018-02-23 PROCEDURE — 33340 PERQ CLSR TCAT L ATR APNDGE: CPT | Performed by: INTERNAL MEDICINE

## 2018-02-23 PROCEDURE — 2500000003 HC RX 250 WO HCPCS

## 2018-02-23 PROCEDURE — B245ZZ4 ULTRASONOGRAPHY OF LEFT HEART, TRANSESOPHAGEAL: ICD-10-PCS | Performed by: INTERNAL MEDICINE

## 2018-02-23 PROCEDURE — 80048 BASIC METABOLIC PNL TOTAL CA: CPT

## 2018-02-23 PROCEDURE — 33340 PERQ CLSR TCAT L ATR APNDGE: CPT

## 2018-02-23 PROCEDURE — 4A023N7 MEASUREMENT OF CARDIAC SAMPLING AND PRESSURE, LEFT HEART, PERCUTANEOUS APPROACH: ICD-10-PCS | Performed by: INTERNAL MEDICINE

## 2018-02-23 PROCEDURE — 6360000002 HC RX W HCPCS

## 2018-02-23 RX ORDER — IPRATROPIUM BROMIDE 42 UG/1
1 SPRAY, METERED NASAL SEE ADMIN INSTRUCTIONS
Status: DISCONTINUED | OUTPATIENT
Start: 2018-02-23 | End: 2018-02-23

## 2018-02-23 RX ORDER — METOPROLOL TARTRATE 50 MG/1
50 TABLET, FILM COATED ORAL 2 TIMES DAILY
Status: DISCONTINUED | OUTPATIENT
Start: 2018-02-23 | End: 2018-02-24 | Stop reason: HOSPADM

## 2018-02-23 RX ORDER — FENTANYL CITRATE 50 UG/ML
INJECTION, SOLUTION INTRAMUSCULAR; INTRAVENOUS PRN
Status: DISCONTINUED | OUTPATIENT
Start: 2018-02-23 | End: 2018-02-23 | Stop reason: SDUPTHER

## 2018-02-23 RX ORDER — SODIUM CHLORIDE 0.9 % (FLUSH) 0.9 %
10 SYRINGE (ML) INJECTION PRN
Status: DISCONTINUED | OUTPATIENT
Start: 2018-02-23 | End: 2018-02-24 | Stop reason: HOSPADM

## 2018-02-23 RX ORDER — LIDOCAINE HYDROCHLORIDE 20 MG/ML
INJECTION, SOLUTION EPIDURAL; INFILTRATION; INTRACAUDAL; PERINEURAL PRN
Status: DISCONTINUED | OUTPATIENT
Start: 2018-02-23 | End: 2018-02-23 | Stop reason: SDUPTHER

## 2018-02-23 RX ORDER — CLINDAMYCIN PHOSPHATE 900 MG/50ML
900 INJECTION INTRAVENOUS
Status: DISPENSED | OUTPATIENT
Start: 2018-02-23 | End: 2018-02-23

## 2018-02-23 RX ORDER — PROPOFOL 10 MG/ML
INJECTION, EMULSION INTRAVENOUS PRN
Status: DISCONTINUED | OUTPATIENT
Start: 2018-02-23 | End: 2018-02-23 | Stop reason: SDUPTHER

## 2018-02-23 RX ORDER — FINASTERIDE 5 MG/1
5 TABLET, FILM COATED ORAL DAILY
Status: DISCONTINUED | OUTPATIENT
Start: 2018-02-24 | End: 2018-02-24 | Stop reason: HOSPADM

## 2018-02-23 RX ORDER — ACETAMINOPHEN 325 MG/1
650 TABLET ORAL EVERY 4 HOURS PRN
Status: DISCONTINUED | OUTPATIENT
Start: 2018-02-23 | End: 2018-02-24 | Stop reason: HOSPADM

## 2018-02-23 RX ORDER — WARFARIN SODIUM 2.5 MG/1
2.5 TABLET ORAL EVERY EVENING
Status: DISCONTINUED | OUTPATIENT
Start: 2018-02-23 | End: 2018-02-24 | Stop reason: HOSPADM

## 2018-02-23 RX ORDER — DIGOXIN 125 MCG
125 TABLET ORAL DAILY
Status: DISCONTINUED | OUTPATIENT
Start: 2018-02-24 | End: 2018-02-24 | Stop reason: HOSPADM

## 2018-02-23 RX ORDER — FENTANYL CITRATE 50 UG/ML
50 INJECTION, SOLUTION INTRAMUSCULAR; INTRAVENOUS EVERY 5 MIN PRN
Status: DISCONTINUED | OUTPATIENT
Start: 2018-02-23 | End: 2018-02-23 | Stop reason: HOSPADM

## 2018-02-23 RX ORDER — GLYCOPYRROLATE 0.2 MG/ML
INJECTION INTRAMUSCULAR; INTRAVENOUS PRN
Status: DISCONTINUED | OUTPATIENT
Start: 2018-02-23 | End: 2018-02-23 | Stop reason: SDUPTHER

## 2018-02-23 RX ORDER — SODIUM CHLORIDE 9 MG/ML
INJECTION, SOLUTION INTRAVENOUS CONTINUOUS
Status: DISCONTINUED | OUTPATIENT
Start: 2018-02-23 | End: 2018-02-23

## 2018-02-23 RX ORDER — ROCURONIUM BROMIDE 10 MG/ML
INJECTION, SOLUTION INTRAVENOUS PRN
Status: DISCONTINUED | OUTPATIENT
Start: 2018-02-23 | End: 2018-02-23 | Stop reason: SDUPTHER

## 2018-02-23 RX ORDER — DEXTROSE MONOHYDRATE 25 G/50ML
12.5 INJECTION, SOLUTION INTRAVENOUS PRN
Status: DISCONTINUED | OUTPATIENT
Start: 2018-02-23 | End: 2018-02-24 | Stop reason: HOSPADM

## 2018-02-23 RX ORDER — HEPARIN SODIUM 1000 [USP'U]/ML
INJECTION, SOLUTION INTRAVENOUS; SUBCUTANEOUS PRN
Status: DISCONTINUED | OUTPATIENT
Start: 2018-02-23 | End: 2018-02-23 | Stop reason: SDUPTHER

## 2018-02-23 RX ORDER — SODIUM CHLORIDE 0.9 % (FLUSH) 0.9 %
10 SYRINGE (ML) INJECTION PRN
Status: DISCONTINUED | OUTPATIENT
Start: 2018-02-23 | End: 2018-02-23

## 2018-02-23 RX ORDER — GABAPENTIN 600 MG/1
600 TABLET ORAL 3 TIMES DAILY
Status: DISCONTINUED | OUTPATIENT
Start: 2018-02-23 | End: 2018-02-24 | Stop reason: HOSPADM

## 2018-02-23 RX ORDER — SODIUM CHLORIDE 0.9 % (FLUSH) 0.9 %
10 SYRINGE (ML) INJECTION EVERY 12 HOURS SCHEDULED
Status: DISCONTINUED | OUTPATIENT
Start: 2018-02-23 | End: 2018-02-24 | Stop reason: HOSPADM

## 2018-02-23 RX ORDER — NICOTINE POLACRILEX 4 MG
15 LOZENGE BUCCAL PRN
Status: DISCONTINUED | OUTPATIENT
Start: 2018-02-23 | End: 2018-02-24 | Stop reason: HOSPADM

## 2018-02-23 RX ORDER — SUCCINYLCHOLINE CHLORIDE 20 MG/ML
INJECTION INTRAMUSCULAR; INTRAVENOUS PRN
Status: DISCONTINUED | OUTPATIENT
Start: 2018-02-23 | End: 2018-02-23 | Stop reason: SDUPTHER

## 2018-02-23 RX ORDER — FLUTICASONE PROPIONATE 50 MCG
1 SPRAY, SUSPENSION (ML) NASAL DAILY PRN
Status: DISCONTINUED | OUTPATIENT
Start: 2018-02-23 | End: 2018-02-24 | Stop reason: HOSPADM

## 2018-02-23 RX ORDER — PROTAMINE SULFATE 10 MG/ML
INJECTION, SOLUTION INTRAVENOUS PRN
Status: DISCONTINUED | OUTPATIENT
Start: 2018-02-23 | End: 2018-02-23 | Stop reason: SDUPTHER

## 2018-02-23 RX ORDER — ONDANSETRON 2 MG/ML
4 INJECTION INTRAMUSCULAR; INTRAVENOUS EVERY 6 HOURS PRN
Status: DISCONTINUED | OUTPATIENT
Start: 2018-02-23 | End: 2018-02-24 | Stop reason: HOSPADM

## 2018-02-23 RX ORDER — DEXAMETHASONE SODIUM PHOSPHATE 4 MG/ML
INJECTION, SOLUTION INTRA-ARTICULAR; INTRALESIONAL; INTRAMUSCULAR; INTRAVENOUS; SOFT TISSUE PRN
Status: DISCONTINUED | OUTPATIENT
Start: 2018-02-23 | End: 2018-02-23 | Stop reason: SDUPTHER

## 2018-02-23 RX ORDER — LABETALOL HYDROCHLORIDE 5 MG/ML
10 INJECTION, SOLUTION INTRAVENOUS EVERY 10 MIN PRN
Status: DISCONTINUED | OUTPATIENT
Start: 2018-02-23 | End: 2018-02-23 | Stop reason: HOSPADM

## 2018-02-23 RX ORDER — MORPHINE SULFATE 2 MG/ML
2 INJECTION, SOLUTION INTRAMUSCULAR; INTRAVENOUS EVERY 5 MIN PRN
Status: DISCONTINUED | OUTPATIENT
Start: 2018-02-23 | End: 2018-02-23 | Stop reason: HOSPADM

## 2018-02-23 RX ORDER — HYDROCHLOROTHIAZIDE 25 MG/1
25 TABLET ORAL DAILY
Status: DISCONTINUED | OUTPATIENT
Start: 2018-02-24 | End: 2018-02-24 | Stop reason: HOSPADM

## 2018-02-23 RX ORDER — DOCUSATE SODIUM 100 MG/1
100 CAPSULE, LIQUID FILLED ORAL DAILY
Status: DISCONTINUED | OUTPATIENT
Start: 2018-02-24 | End: 2018-02-23

## 2018-02-23 RX ORDER — MIDAZOLAM HYDROCHLORIDE 1 MG/ML
INJECTION INTRAMUSCULAR; INTRAVENOUS PRN
Status: DISCONTINUED | OUTPATIENT
Start: 2018-02-23 | End: 2018-02-23 | Stop reason: SDUPTHER

## 2018-02-23 RX ORDER — DOCUSATE SODIUM 250 MG
250 CAPSULE ORAL NIGHTLY
COMMUNITY
End: 2019-12-10

## 2018-02-23 RX ORDER — NEOSTIGMINE METHYLSULFATE 1 MG/ML
INJECTION, SOLUTION INTRAVENOUS PRN
Status: DISCONTINUED | OUTPATIENT
Start: 2018-02-23 | End: 2018-02-23 | Stop reason: SDUPTHER

## 2018-02-23 RX ORDER — ACETAMINOPHEN 325 MG/1
650 TABLET ORAL EVERY 4 HOURS PRN
Status: DISCONTINUED | OUTPATIENT
Start: 2018-02-23 | End: 2018-02-23

## 2018-02-23 RX ORDER — ONDANSETRON 2 MG/ML
INJECTION INTRAMUSCULAR; INTRAVENOUS PRN
Status: DISCONTINUED | OUTPATIENT
Start: 2018-02-23 | End: 2018-02-23 | Stop reason: SDUPTHER

## 2018-02-23 RX ORDER — DOCUSATE SODIUM 100 MG/1
100 CAPSULE, LIQUID FILLED ORAL NIGHTLY
Status: DISCONTINUED | OUTPATIENT
Start: 2018-02-23 | End: 2018-02-24 | Stop reason: HOSPADM

## 2018-02-23 RX ORDER — DEXTROSE AND SODIUM CHLORIDE 5; .9 G/100ML; G/100ML
100 INJECTION, SOLUTION INTRAVENOUS PRN
Status: DISCONTINUED | OUTPATIENT
Start: 2018-02-23 | End: 2018-02-24 | Stop reason: HOSPADM

## 2018-02-23 RX ADMIN — DEXAMETHASONE SODIUM PHOSPHATE 8 MG: 4 INJECTION, SOLUTION INTRAMUSCULAR; INTRAVENOUS at 14:27

## 2018-02-23 RX ADMIN — PHENYLEPHRINE HYDROCHLORIDE 100 MCG: 10 INJECTION INTRAMUSCULAR; INTRAVENOUS; SUBCUTANEOUS at 12:45

## 2018-02-23 RX ADMIN — Medication 140 MG: at 13:00

## 2018-02-23 RX ADMIN — METOPROLOL TARTRATE 50 MG: 50 TABLET, FILM COATED ORAL at 23:15

## 2018-02-23 RX ADMIN — Medication 10 MG: at 14:50

## 2018-02-23 RX ADMIN — HEPARIN SODIUM 5000 UNITS: 1000 INJECTION, SOLUTION INTRAVENOUS; SUBCUTANEOUS at 15:35

## 2018-02-23 RX ADMIN — PROTAMINE SULFATE 50 MG: 10 INJECTION, SOLUTION INTRAVENOUS at 15:55

## 2018-02-23 RX ADMIN — FENTANYL CITRATE 50 MCG: 50 INJECTION INTRAMUSCULAR; INTRAVENOUS at 12:26

## 2018-02-23 RX ADMIN — SODIUM CHLORIDE: 9 INJECTION, SOLUTION INTRAVENOUS at 15:20

## 2018-02-23 RX ADMIN — Medication 10 ML: at 21:47

## 2018-02-23 RX ADMIN — PROPOFOL 150 MG: 10 INJECTION, EMULSION INTRAVENOUS at 13:00

## 2018-02-23 RX ADMIN — HEPARIN SODIUM 5000 UNITS: 1000 INJECTION, SOLUTION INTRAVENOUS; SUBCUTANEOUS at 15:02

## 2018-02-23 RX ADMIN — LABETALOL HYDROCHLORIDE 10 MG: 5 INJECTION INTRAVENOUS at 16:54

## 2018-02-23 RX ADMIN — HEPARIN SODIUM 15000 UNITS: 1000 INJECTION, SOLUTION INTRAVENOUS; SUBCUTANEOUS at 14:06

## 2018-02-23 RX ADMIN — SODIUM CHLORIDE: 9 INJECTION, SOLUTION INTRAVENOUS at 09:53

## 2018-02-23 RX ADMIN — WARFARIN SODIUM 2.5 MG: 2.5 TABLET ORAL at 23:15

## 2018-02-23 RX ADMIN — NEOSTIGMINE METHYLSULFATE 4 MG: 1 INJECTION, SOLUTION INTRAVENOUS at 16:02

## 2018-02-23 RX ADMIN — LIDOCAINE HYDROCHLORIDE 100 MG: 20 INJECTION, SOLUTION EPIDURAL; INFILTRATION; INTRACAUDAL; PERINEURAL at 13:00

## 2018-02-23 RX ADMIN — Medication 10 MG: at 14:47

## 2018-02-23 RX ADMIN — GABAPENTIN 600 MG: 600 TABLET ORAL at 23:15

## 2018-02-23 RX ADMIN — FENTANYL CITRATE 50 MCG: 50 INJECTION INTRAMUSCULAR; INTRAVENOUS at 13:00

## 2018-02-23 RX ADMIN — VANCOMYCIN HYDROCHLORIDE 2000 MG: 1 INJECTION, POWDER, LYOPHILIZED, FOR SOLUTION INTRAVENOUS at 11:29

## 2018-02-23 RX ADMIN — GLYCOPYRROLATE 0.6 MG: 0.2 INJECTION, SOLUTION INTRAMUSCULAR; INTRAVENOUS at 16:02

## 2018-02-23 RX ADMIN — Medication 30 MG: at 13:21

## 2018-02-23 RX ADMIN — ONDANSETRON 4 MG: 2 INJECTION INTRAMUSCULAR; INTRAVENOUS at 14:27

## 2018-02-23 RX ADMIN — MIDAZOLAM HYDROCHLORIDE 2 MG: 1 INJECTION, SOLUTION INTRAMUSCULAR; INTRAVENOUS at 12:26

## 2018-02-23 ASSESSMENT — PULMONARY FUNCTION TESTS
PIF_VALUE: 20
PIF_VALUE: 0
PIF_VALUE: 20
PIF_VALUE: 21
PIF_VALUE: 19
PIF_VALUE: 20
PIF_VALUE: 21
PIF_VALUE: 21
PIF_VALUE: 0
PIF_VALUE: 0
PIF_VALUE: 20
PIF_VALUE: 20
PIF_VALUE: 21
PIF_VALUE: 20
PIF_VALUE: 20
PIF_VALUE: 21
PIF_VALUE: 19
PIF_VALUE: 0
PIF_VALUE: 20
PIF_VALUE: 20
PIF_VALUE: 32
PIF_VALUE: 2
PIF_VALUE: 21
PIF_VALUE: 0
PIF_VALUE: 20
PIF_VALUE: 21
PIF_VALUE: 21
PIF_VALUE: 20
PIF_VALUE: 20
PIF_VALUE: 0
PIF_VALUE: 23
PIF_VALUE: 0
PIF_VALUE: 20
PIF_VALUE: 21
PIF_VALUE: 20
PIF_VALUE: 19
PIF_VALUE: 19
PIF_VALUE: 20
PIF_VALUE: 21
PIF_VALUE: 19
PIF_VALUE: 20
PIF_VALUE: 20
PIF_VALUE: 21
PIF_VALUE: 19
PIF_VALUE: 21
PIF_VALUE: 0
PIF_VALUE: 21
PIF_VALUE: 2
PIF_VALUE: 20
PIF_VALUE: 21
PIF_VALUE: 20
PIF_VALUE: 20
PIF_VALUE: 24
PIF_VALUE: 19
PIF_VALUE: 20
PIF_VALUE: 21
PIF_VALUE: 0
PIF_VALUE: 20
PIF_VALUE: 23
PIF_VALUE: 21
PIF_VALUE: 21
PIF_VALUE: 0
PIF_VALUE: 21
PIF_VALUE: 20
PIF_VALUE: 20
PIF_VALUE: 0
PIF_VALUE: 21
PIF_VALUE: 21
PIF_VALUE: 20
PIF_VALUE: 0
PIF_VALUE: 21
PIF_VALUE: 2
PIF_VALUE: 21
PIF_VALUE: 21
PIF_VALUE: 20
PIF_VALUE: 21
PIF_VALUE: 20
PIF_VALUE: 20
PIF_VALUE: 22
PIF_VALUE: 20
PIF_VALUE: 20
PIF_VALUE: 21
PIF_VALUE: 0
PIF_VALUE: 21
PIF_VALUE: 20
PIF_VALUE: 21
PIF_VALUE: 20
PIF_VALUE: 20
PIF_VALUE: 21
PIF_VALUE: 20
PIF_VALUE: 19
PIF_VALUE: 0
PIF_VALUE: 23
PIF_VALUE: 0
PIF_VALUE: 21
PIF_VALUE: 2
PIF_VALUE: 20
PIF_VALUE: 0
PIF_VALUE: 20
PIF_VALUE: 20
PIF_VALUE: 13
PIF_VALUE: 21
PIF_VALUE: 1
PIF_VALUE: 21
PIF_VALUE: 21
PIF_VALUE: 20
PIF_VALUE: 0
PIF_VALUE: 20
PIF_VALUE: 20
PIF_VALUE: 21
PIF_VALUE: 20
PIF_VALUE: 21
PIF_VALUE: 20
PIF_VALUE: 21
PIF_VALUE: 20
PIF_VALUE: 21
PIF_VALUE: 22
PIF_VALUE: 19
PIF_VALUE: 21
PIF_VALUE: 21
PIF_VALUE: 20
PIF_VALUE: 21
PIF_VALUE: 20
PIF_VALUE: 0
PIF_VALUE: 16
PIF_VALUE: 20
PIF_VALUE: 2
PIF_VALUE: 21
PIF_VALUE: 26
PIF_VALUE: 21
PIF_VALUE: 19
PIF_VALUE: 0
PIF_VALUE: 20
PIF_VALUE: 20
PIF_VALUE: 21
PIF_VALUE: 24
PIF_VALUE: 20
PIF_VALUE: 0
PIF_VALUE: 20
PIF_VALUE: 20
PIF_VALUE: 19
PIF_VALUE: 21
PIF_VALUE: 1
PIF_VALUE: 0
PIF_VALUE: 20
PIF_VALUE: 21
PIF_VALUE: 18
PIF_VALUE: 20
PIF_VALUE: 22
PIF_VALUE: 20
PIF_VALUE: 23
PIF_VALUE: 20
PIF_VALUE: 20
PIF_VALUE: 8
PIF_VALUE: 20
PIF_VALUE: 0
PIF_VALUE: 20
PIF_VALUE: 20
PIF_VALUE: 0
PIF_VALUE: 0
PIF_VALUE: 20
PIF_VALUE: 20
PIF_VALUE: 0
PIF_VALUE: 20
PIF_VALUE: 21
PIF_VALUE: 22
PIF_VALUE: 20
PIF_VALUE: 20
PIF_VALUE: 0
PIF_VALUE: 21
PIF_VALUE: 21
PIF_VALUE: 8
PIF_VALUE: 22
PIF_VALUE: 20
PIF_VALUE: 0
PIF_VALUE: 21
PIF_VALUE: 20
PIF_VALUE: 19
PIF_VALUE: 19
PIF_VALUE: 23
PIF_VALUE: 18
PIF_VALUE: 0
PIF_VALUE: 0
PIF_VALUE: 20
PIF_VALUE: 20
PIF_VALUE: 0
PIF_VALUE: 20
PIF_VALUE: 21
PIF_VALUE: 0
PIF_VALUE: 20
PIF_VALUE: 21
PIF_VALUE: 20
PIF_VALUE: 21
PIF_VALUE: 20
PIF_VALUE: 21
PIF_VALUE: 20
PIF_VALUE: 21
PIF_VALUE: 22
PIF_VALUE: 0
PIF_VALUE: 21
PIF_VALUE: 20
PIF_VALUE: 20
PIF_VALUE: 0
PIF_VALUE: 21
PIF_VALUE: 0
PIF_VALUE: 23
PIF_VALUE: 20
PIF_VALUE: 1
PIF_VALUE: 20
PIF_VALUE: 22
PIF_VALUE: 19
PIF_VALUE: 20
PIF_VALUE: 0
PIF_VALUE: 20
PIF_VALUE: 0
PIF_VALUE: 22
PIF_VALUE: 25
PIF_VALUE: 1

## 2018-02-23 ASSESSMENT — PAIN SCALES - GENERAL: PAINLEVEL_OUTOF10: 5

## 2018-02-23 ASSESSMENT — PAIN DESCRIPTION - DESCRIPTORS: DESCRIPTORS: HEADACHE

## 2018-02-23 NOTE — ANESTHESIA PRE PROCEDURE
Department of Anesthesiology  Preprocedure Note       Name:  Mingo Farah   Age:  75 y.o.  :  1950                                          MRN:  [id number]         Date:  2018      Surgeon: * No surgeons listed *    Procedure: * No procedures listed *    Medications prior to admission:   Prior to Admission medications    Medication Sig Start Date End Date Taking? Authorizing Provider   NONFORMULARY Take 1 tablet by mouth 2 times daily Milk Thistle   Yes Historical Provider, MD   NONFORMULARY Take 1 capsule by mouth 2 times daily Equate Digestive care probiotic   Yes Historical Provider, MD   docusate sodium (COLACE) 250 MG capsule Take 250 mg by mouth daily   Yes Historical Provider, MD   tamsulosin (FLOMAX) 0.4 MG capsule take 1 capsule by mouth once daily 18  Yes Anna Arambula NP   warfarin (COUMADIN) 2.5 MG tablet Take 2.5 mg by mouth every evening Dosed by [facility]. Yes Historical Provider, MD   metoprolol tartrate (LOPRESSOR) 50 MG tablet take 1 tablet by mouth twice a day 17  Yes Darian Cha CNP   digoxin (LANOXIN) 125 MCG tablet Take 125 mcg by mouth daily  10/3/17  Yes Historical Provider, MD   insulin NPH (HUMULIN N;NOVOLIN N) 100 UNIT/ML injection vial Inject 40 Units into the skin 2 times daily Lunch and bedtime   Yes Historical Provider, MD   insulin aspart (NOVOLOG) 100 UNIT/ML injection vial Inject into the skin 4 times daily Glucose  6 units,  101-120 9 units,  121 - 150 12 units,  151-200 15 units,  201 - 250 18 units,  251 -300 24 units,  301-350 30 units, 351-400 36 units.    Yes Historical Provider, MD   gabapentin (NEURONTIN) 600 MG tablet Take 600 mg by mouth 3 times daily Indications: Pain    Yes Historical Provider, MD   fluticasone (FLONASE) 50 MCG/ACT nasal spray 1 spray by Nasal route daily as needed for Rhinitis or Allergies    Yes Historical Provider, MD   finasteride (PROSCAR) 5 MG tablet   Take 5 mg by mouth daily Prostate 2/16/15  Yes Historical Provider, MD   hydrochlorothiazide (HYDRODIURIL) 25 MG tablet Take 25 mg by mouth daily. Indications: High Blood Pressure   Yes Historical Provider, MD   lovastatin (MEVACOR) 20 MG tablet Take 20 mg by mouth nightly. Indications: High Amount of Fats in the Blood   Yes Historical Provider, MD   ipratropium (ATROVENT) 0.06 % nasal spray by Nasal route See Admin Instructions  9/7/17   Historical Provider, MD   Sodium Chloride-Sodium Bicarb (CLASSIC NETI POT SINUS [address]) 2300-700 MG KIT by Nasal route as needed Indications: Congestion of the Nasal Sinuses  5/19/16   Aleah Mccoy MD       Current medications:    Current Facility-Administered Medications   Medication Dose Route Frequency Provider Last Rate Last Dose    clindamycin (CLEOCIN) 900 mg in dextrose 5 % 50 mL IVPB  900 mg Intravenous On Call to [address] [facility] , NP        sodium chloride flush 0.9 % injection 10 mL  10 mL Intravenous PRN Aazel Frausto MD        acetaminophen (TYLENOL) tablet 650 mg  650 mg Oral Q4H PRN Azael Frausto MD        vancomycin (VANCOCIN) 2,000 mg in dextrose 5 % 500 mL IVPB  2,000 mg Intravenous Once Azael Frausto  mL/hr at 02/23/18 1129 2,000 mg at 02/23/18 1129    0.9 % sodium chloride infusion   Intravenous Continuous Azael Frausto MD 20 mL/hr at 02/23/18 5354         Allergies:     Allergies   Allergen Reactions    Sulfa Antibiotics Rash       Problem List:    Patient Active Problem List   Diagnosis Code    GI bleed not requiring more than 4 units of blood in 24 hours, ICU, or surgery K92.2    Osteoarthrosis of hip M16.9    Diabetes mellitus type 2 in obese ([facility] [address] E11.69, E66.9    Current use of long term anticoagulation Z79.01    Essential hypertension, benign I10    Hyperlipidemia with target LDL less than 70 E78.5    Chronic nonallergic rhinitis J31.0    Kidney stone N20.0    Vasovagal syncope R55    Persistent atrial fibrillation (HCC) I48.1    Preop

## 2018-02-23 NOTE — ANESTHESIA PROCEDURE NOTES
Arterial Line:    An arterial line was placed using surface landmarks, in the OR for the following indication(s): continuous blood pressure monitoring and blood sampling needed. A 20 gauge (size), 1 and 1/4 inch (length), Arrow (type) catheter was placed, Seldinger technique used, into the right radial artery, secured by Tegaderm, tape and suture. Anesthesia type: Local  Local infiltration: Topical and None    Events:  patient tolerated procedure well with no complications.   2/23/2018 12:30 PM2/23/2018 12:45 PM  Anesthesiologist: Jannie Salcedo  Performed: Anesthesiologist   Preanesthetic Checklist  Completed: patient identified, site marked, surgical consent, pre-op evaluation, timeout performed, IV checked, risks and benefits discussed, monitors and equipment checked, anesthesia consent given, oxygen available and patient being monitored

## 2018-02-24 VITALS
OXYGEN SATURATION: 95 % | RESPIRATION RATE: 16 BRPM | BODY MASS INDEX: 41.62 KG/M2 | SYSTOLIC BLOOD PRESSURE: 159 MMHG | TEMPERATURE: 98 F | HEART RATE: 86 BPM | HEIGHT: 72 IN | DIASTOLIC BLOOD PRESSURE: 78 MMHG | WEIGHT: 307.3 LBS

## 2018-02-24 LAB — GLUCOSE BLD-MCNC: 426 MG/DL (ref 70–108)

## 2018-02-24 PROCEDURE — 82948 REAGENT STRIP/BLOOD GLUCOSE: CPT

## 2018-02-24 PROCEDURE — 2580000003 HC RX 258: Performed by: INTERNAL MEDICINE

## 2018-02-24 PROCEDURE — 6370000000 HC RX 637 (ALT 250 FOR IP): Performed by: FAMILY MEDICINE

## 2018-02-24 PROCEDURE — 6370000000 HC RX 637 (ALT 250 FOR IP): Performed by: INTERNAL MEDICINE

## 2018-02-24 RX ADMIN — FINASTERIDE 5 MG: 5 TABLET, FILM COATED ORAL at 08:38

## 2018-02-24 RX ADMIN — Medication 2 UNITS: at 00:01

## 2018-02-24 RX ADMIN — Medication 10 ML: at 09:03

## 2018-02-24 RX ADMIN — METOPROLOL TARTRATE 50 MG: 50 TABLET, FILM COATED ORAL at 08:38

## 2018-02-24 RX ADMIN — DIGOXIN 125 MCG: 125 TABLET ORAL at 08:37

## 2018-02-24 RX ADMIN — HYDROCHLOROTHIAZIDE 25 MG: 25 TABLET ORAL at 08:36

## 2018-02-24 RX ADMIN — DOCUSATE SODIUM 100 MG: 100 CAPSULE ORAL at 00:06

## 2018-02-24 RX ADMIN — GABAPENTIN 600 MG: 600 TABLET ORAL at 08:36

## 2018-02-24 ASSESSMENT — PAIN SCALES - GENERAL
PAINLEVEL_OUTOF10: 0
PAINLEVEL_OUTOF10: 0

## 2018-02-27 ENCOUNTER — TELEPHONE (OUTPATIENT)
Dept: CARDIOLOGY CLINIC | Age: 68
End: 2018-02-27

## 2018-02-27 NOTE — TELEPHONE ENCOUNTER
Patient was discharged from [facility] on 2/24/18. He is to follow up with Dr. Beryle Latina in two months. I left a message for patient to call back to schedule.

## 2018-03-06 ENCOUNTER — HOSPITAL ENCOUNTER (OUTPATIENT)
Dept: PHARMACY | Age: 68
Setting detail: THERAPIES SERIES
Discharge: HOME | End: 2018-03-06
Payer: MEDICARE

## 2018-03-06 DIAGNOSIS — I48.19 PERSISTENT ATRIAL FIBRILLATION (HCC): ICD-10-CM

## 2018-03-06 LAB — POC INR: 2.2 (ref 0.8–1.2)

## 2018-03-06 PROCEDURE — 85610 PROTHROMBIN TIME: CPT | Performed by: PHARMACIST

## 2018-03-06 PROCEDURE — 36416 COLLJ CAPILLARY BLOOD SPEC: CPT | Performed by: PHARMACIST

## 2018-03-06 PROCEDURE — 99211 OFF/OP EST MAY X REQ PHY/QHP: CPT | Performed by: PHARMACIST

## 2018-03-06 RX ORDER — WARFARIN SODIUM 2.5 MG/1
2.5 TABLET ORAL EVERY EVENING
Qty: 90 TABLET | Refills: 3 | Status: ON HOLD | OUTPATIENT
Start: 2018-03-06 | End: 2018-08-15 | Stop reason: DRUGHIGH

## 2018-03-06 NOTE — PROGRESS NOTES
The [address] [facility]  Anticoagulation Clinic  [phone] (phone)  [phone] (fax)  Mr. Sandor Howell is a 75 y.o.  male with history of Afib who presents today for anticoagulation monitoring and adjustment. Patient failed Watchman d/t size of ventricle. Patient verifies current dosing regimen and tablet strength. No missed or extra doses. Patient denies s/s bleeding/bruising/swelling/SOB/chest pain  No blood in urine or stool. No dietary changes. No changes in medication/OTC agents/Herbals. No change in alcohol use or tobacco use. No change in activity level. Patient denies headaches/dizziness/lightheadedness/falls. No vomiting/diarrhea or acute illness. No Procedures scheduled in the future at this time. Assessment:   Lab Results   Component Value Date    INR 2.20 (H) 03/06/2018    INR 2.13 (H) 02/23/2018    INR 2.40 (H) 01/23/2018     INR therapeutic   Recent Labs      03/06/18   1308   INR  2.20*         Plan:  Continue Coumadin 2.5mg daily. Recheck INR 6 weeks. Patient reminded to call the Anticoagulation Clinic with any signs or symptoms of bleeding or with any medication changes. Patient given instructions utilizing the teach back method. Discharged ambulatory in no apparent distress. After visit summary printed and reviewed with patient.       Medications reviewed and updated on home medication list Yes    Influenza vaccine:     [] given    [] declined   [] received previously   [] plans to receive at a later time   [] refused    [x] documented in EPIC

## 2018-04-17 ENCOUNTER — HOSPITAL ENCOUNTER (OUTPATIENT)
Dept: PHARMACY | Age: 68
Setting detail: THERAPIES SERIES
Discharge: HOME | End: 2018-04-17
Payer: MEDICARE

## 2018-04-17 DIAGNOSIS — I48.19 PERSISTENT ATRIAL FIBRILLATION (HCC): ICD-10-CM

## 2018-04-17 DIAGNOSIS — I48.21 PERMANENT ATRIAL FIBRILLATION (HCC): ICD-10-CM

## 2018-04-17 PROBLEM — Z01.810 PREOP CARDIOVASCULAR EXAM: Status: RESOLVED | Noted: 2017-04-25 | Resolved: 2018-04-17

## 2018-04-17 LAB — POC INR: 2.3 (ref 0.8–1.2)

## 2018-04-17 PROCEDURE — 36416 COLLJ CAPILLARY BLOOD SPEC: CPT

## 2018-04-17 PROCEDURE — 99211 OFF/OP EST MAY X REQ PHY/QHP: CPT

## 2018-04-17 PROCEDURE — 85610 PROTHROMBIN TIME: CPT

## 2018-05-02 ENCOUNTER — OFFICE VISIT (OUTPATIENT)
Dept: CARDIOLOGY CLINIC | Age: 68
End: 2018-05-02
Payer: MEDICARE

## 2018-05-02 VITALS
BODY MASS INDEX: 40.99 KG/M2 | HEART RATE: 80 BPM | SYSTOLIC BLOOD PRESSURE: 144 MMHG | WEIGHT: 302.6 LBS | DIASTOLIC BLOOD PRESSURE: 74 MMHG | HEIGHT: 72 IN

## 2018-05-02 DIAGNOSIS — R06.09 DOE (DYSPNEA ON EXERTION): ICD-10-CM

## 2018-05-02 DIAGNOSIS — R60.0 BILATERAL LOWER EXTREMITY EDEMA: Primary | ICD-10-CM

## 2018-05-02 PROCEDURE — G8417 CALC BMI ABV UP PARAM F/U: HCPCS | Performed by: INTERNAL MEDICINE

## 2018-05-02 PROCEDURE — G8427 DOCREV CUR MEDS BY ELIG CLIN: HCPCS | Performed by: INTERNAL MEDICINE

## 2018-05-02 PROCEDURE — 4040F PNEUMOC VAC/ADMIN/RCVD: CPT | Performed by: INTERNAL MEDICINE

## 2018-05-02 PROCEDURE — 3017F COLORECTAL CA SCREEN DOC REV: CPT | Performed by: INTERNAL MEDICINE

## 2018-05-02 PROCEDURE — 99214 OFFICE O/P EST MOD 30 MIN: CPT | Performed by: INTERNAL MEDICINE

## 2018-05-02 PROCEDURE — 1123F ACP DISCUSS/DSCN MKR DOCD: CPT | Performed by: INTERNAL MEDICINE

## 2018-05-02 PROCEDURE — 1036F TOBACCO NON-USER: CPT | Performed by: INTERNAL MEDICINE

## 2018-05-02 RX ORDER — FUROSEMIDE 40 MG/1
40 TABLET ORAL 2 TIMES DAILY
Qty: 60 TABLET | Refills: 3 | Status: SHIPPED | OUTPATIENT
Start: 2018-05-02 | End: 2018-05-02 | Stop reason: DRUGHIGH

## 2018-05-02 RX ORDER — FUROSEMIDE 40 MG/1
40 TABLET ORAL DAILY
COMMUNITY
End: 2018-06-05 | Stop reason: SDUPTHER

## 2018-05-02 ASSESSMENT — ENCOUNTER SYMPTOMS
EYE DISCHARGE: 0
SHORTNESS OF BREATH: 0
CHEST TIGHTNESS: 0
DIARRHEA: 0
SORE THROAT: 0
APNEA: 0
BLOOD IN STOOL: 0
EYE ITCHING: 0
COUGH: 0
NAUSEA: 0
ABDOMINAL DISTENTION: 0
EYE PAIN: 0
BACK PAIN: 0
EYE REDNESS: 0
ABDOMINAL PAIN: 0
CONSTIPATION: 0
SINUS PRESSURE: 0

## 2018-05-03 ENCOUNTER — TELEPHONE (OUTPATIENT)
Dept: CARDIOLOGY CLINIC | Age: 68
End: 2018-05-03

## 2018-05-03 NOTE — TELEPHONE ENCOUNTER
Nick Judd is at the  today. He had an appointment yesterday and was started on Lasix. He is questioning if he should continue the hydrochlorothiazide and add the Lasix. Or is he only suppose to be taking the Lasix? ? He is volunteering at [facility]. today if you have any questions.

## 2018-05-09 ENCOUNTER — OFFICE VISIT (OUTPATIENT)
Dept: CARDIOTHORACIC SURGERY | Age: 68
End: 2018-05-09
Payer: MEDICARE

## 2018-05-09 VITALS
DIASTOLIC BLOOD PRESSURE: 66 MMHG | SYSTOLIC BLOOD PRESSURE: 132 MMHG | HEIGHT: 72 IN | HEART RATE: 88 BPM | WEIGHT: 296 LBS | BODY MASS INDEX: 40.09 KG/M2

## 2018-05-09 DIAGNOSIS — K92.2 GI BLEED NOT REQUIRING MORE THAN 4 UNITS OF BLOOD IN 24 HOURS, ICU, OR SURGERY: Primary | ICD-10-CM

## 2018-05-09 DIAGNOSIS — I48.21 PERMANENT ATRIAL FIBRILLATION (HCC): ICD-10-CM

## 2018-05-09 PROCEDURE — 1036F TOBACCO NON-USER: CPT | Performed by: THORACIC SURGERY (CARDIOTHORACIC VASCULAR SURGERY)

## 2018-05-09 PROCEDURE — G8417 CALC BMI ABV UP PARAM F/U: HCPCS | Performed by: THORACIC SURGERY (CARDIOTHORACIC VASCULAR SURGERY)

## 2018-05-09 PROCEDURE — G8427 DOCREV CUR MEDS BY ELIG CLIN: HCPCS | Performed by: THORACIC SURGERY (CARDIOTHORACIC VASCULAR SURGERY)

## 2018-05-09 PROCEDURE — 4040F PNEUMOC VAC/ADMIN/RCVD: CPT | Performed by: THORACIC SURGERY (CARDIOTHORACIC VASCULAR SURGERY)

## 2018-05-09 PROCEDURE — 3017F COLORECTAL CA SCREEN DOC REV: CPT | Performed by: THORACIC SURGERY (CARDIOTHORACIC VASCULAR SURGERY)

## 2018-05-09 PROCEDURE — 1123F ACP DISCUSS/DSCN MKR DOCD: CPT | Performed by: THORACIC SURGERY (CARDIOTHORACIC VASCULAR SURGERY)

## 2018-05-09 PROCEDURE — 99205 OFFICE O/P NEW HI 60 MIN: CPT | Performed by: THORACIC SURGERY (CARDIOTHORACIC VASCULAR SURGERY)

## 2018-05-09 ASSESSMENT — ENCOUNTER SYMPTOMS
ABDOMINAL PAIN: 0
EYE DISCHARGE: 0
SHORTNESS OF BREATH: 0
APNEA: 0

## 2018-05-24 ENCOUNTER — HOSPITAL ENCOUNTER (OUTPATIENT)
Age: 68
Discharge: HOME | End: 2018-05-24
Payer: MEDICARE

## 2018-05-24 ENCOUNTER — HOSPITAL ENCOUNTER (OUTPATIENT)
Dept: GENERAL RADIOLOGY | Age: 68
Discharge: HOME | End: 2018-05-24
Payer: MEDICARE

## 2018-05-24 DIAGNOSIS — R06.09 DOE (DYSPNEA ON EXERTION): ICD-10-CM

## 2018-05-24 DIAGNOSIS — N20.0 KIDNEY STONE: ICD-10-CM

## 2018-05-24 DIAGNOSIS — R60.0 BILATERAL LOWER EXTREMITY EDEMA: ICD-10-CM

## 2018-05-24 LAB
ALBUMIN SERPL-MCNC: 3.8 G/DL (ref 3.5–5.1)
ALP BLD-CCNC: 90 U/L (ref 38–126)
ALT SERPL-CCNC: 13 U/L (ref 11–66)
ANION GAP SERPL CALCULATED.3IONS-SCNC: 9 MEQ/L (ref 8–16)
ANISOCYTOSIS: NORMAL
AST SERPL-CCNC: 21 U/L (ref 5–40)
AVERAGE GLUCOSE: 153 MG/DL (ref 70–126)
BASOPHILS # BLD: 0.5 %
BASOPHILS ABSOLUTE: 0.1 THOU/MM3 (ref 0–0.1)
BILIRUB SERPL-MCNC: 0.7 MG/DL (ref 0.3–1.2)
BILIRUBIN DIRECT: < 0.2 MG/DL (ref 0–0.3)
BUN BLDV-MCNC: 21 MG/DL (ref 7–22)
CALCIUM SERPL-MCNC: 9.3 MG/DL (ref 8.5–10.5)
CHLORIDE BLD-SCNC: 96 MEQ/L (ref 98–111)
CHOLESTEROL, TOTAL: 150 MG/DL (ref 100–199)
CO2: 34 MEQ/L (ref 23–33)
CREAT SERPL-MCNC: 1 MG/DL (ref 0.4–1.2)
CREATININE, URINE: 220.4 MG/DL
EOSINOPHIL # BLD: 2.8 %
EOSINOPHILS ABSOLUTE: 0.3 THOU/MM3 (ref 0–0.4)
GFR SERPL CREATININE-BSD FRML MDRD: 74 ML/MIN/1.73M2
GLUCOSE BLD-MCNC: 194 MG/DL (ref 70–108)
HBA1C MFR BLD: 7.1 % (ref 4.4–6.4)
HCT VFR BLD CALC: 46.7 % (ref 42–52)
HDLC SERPL-MCNC: 37 MG/DL
HEMOGLOBIN: 15.8 GM/DL (ref 14–18)
HEPATITIS C ANTIBODY: NEGATIVE
LDL CHOLESTEROL CALCULATED: 70 MG/DL
LYMPHOCYTES # BLD: 30.6 %
LYMPHOCYTES ABSOLUTE: 3.2 THOU/MM3 (ref 1–4.8)
MCH RBC QN AUTO: 28.8 PG (ref 27–31)
MCHC RBC AUTO-ENTMCNC: 33.9 GM/DL (ref 33–37)
MCV RBC AUTO: 84.9 FL (ref 80–94)
MICROALBUMIN UR-MCNC: 38.14 MG/DL
MICROALBUMIN/CREAT UR-RTO: 173 MG/G (ref 0–30)
MONOCYTES # BLD: 7.1 %
MONOCYTES ABSOLUTE: 0.7 THOU/MM3 (ref 0.4–1.3)
NUCLEATED RED BLOOD CELLS: 0 /100 WBC
PDW BLD-RTO: 14.5 % (ref 11.5–14.5)
PLATELET # BLD: 256 THOU/MM3 (ref 130–400)
PMV BLD AUTO: 9.2 FL (ref 7.4–10.4)
POTASSIUM SERPL-SCNC: 3.5 MEQ/L (ref 3.5–5.2)
RBC # BLD: 5.5 MILL/MM3 (ref 4.7–6.1)
SEG NEUTROPHILS: 59 %
SEGMENTED NEUTROPHILS ABSOLUTE COUNT: 6.1 THOU/MM3 (ref 1.8–7.7)
SODIUM BLD-SCNC: 139 MEQ/L (ref 135–145)
TOTAL PROTEIN: 7.7 G/DL (ref 6.1–8)
TRIGL SERPL-MCNC: 216 MG/DL (ref 0–199)
WBC # BLD: 10.4 THOU/MM3 (ref 4.8–10.8)

## 2018-05-24 PROCEDURE — 80053 COMPREHEN METABOLIC PANEL: CPT

## 2018-05-24 PROCEDURE — 82043 UR ALBUMIN QUANTITATIVE: CPT

## 2018-05-24 PROCEDURE — 74018 RADEX ABDOMEN 1 VIEW: CPT

## 2018-05-24 PROCEDURE — 86803 HEPATITIS C AB TEST: CPT

## 2018-05-24 PROCEDURE — 85025 COMPLETE CBC W/AUTO DIFF WBC: CPT

## 2018-05-24 PROCEDURE — 83036 HEMOGLOBIN GLYCOSYLATED A1C: CPT

## 2018-05-24 PROCEDURE — 87077 CULTURE AEROBIC IDENTIFY: CPT

## 2018-05-24 PROCEDURE — 36415 COLL VENOUS BLD VENIPUNCTURE: CPT

## 2018-05-24 PROCEDURE — 87086 URINE CULTURE/COLONY COUNT: CPT

## 2018-05-24 PROCEDURE — 87186 SC STD MICRODIL/AGAR DIL: CPT

## 2018-05-24 PROCEDURE — 82248 BILIRUBIN DIRECT: CPT

## 2018-05-24 PROCEDURE — 87184 SC STD DISK METHOD PER PLATE: CPT

## 2018-05-24 PROCEDURE — 80061 LIPID PANEL: CPT

## 2018-05-26 ENCOUNTER — TELEPHONE (OUTPATIENT)
Dept: UROLOGY | Age: 68
End: 2018-05-26

## 2018-05-26 LAB
ORGANISM: ABNORMAL
URINE CULTURE, ROUTINE: ABNORMAL

## 2018-05-26 RX ORDER — DOXYCYCLINE 100 MG/1
100 CAPSULE ORAL 2 TIMES DAILY
Qty: 20 CAPSULE | Refills: 0 | Status: SHIPPED | OUTPATIENT
Start: 2018-05-26 | End: 2018-06-05

## 2018-05-30 ENCOUNTER — OFFICE VISIT (OUTPATIENT)
Dept: UROLOGY | Age: 68
End: 2018-05-30
Payer: MEDICARE

## 2018-05-30 ENCOUNTER — HOSPITAL ENCOUNTER (OUTPATIENT)
Dept: PHARMACY | Age: 68
Setting detail: THERAPIES SERIES
Discharge: HOME | End: 2018-05-30
Payer: MEDICARE

## 2018-05-30 VITALS
BODY MASS INDEX: 40.23 KG/M2 | WEIGHT: 297 LBS | SYSTOLIC BLOOD PRESSURE: 130 MMHG | DIASTOLIC BLOOD PRESSURE: 76 MMHG | HEIGHT: 72 IN

## 2018-05-30 DIAGNOSIS — N40.1 BENIGN PROSTATIC HYPERPLASIA WITH URINARY FREQUENCY: ICD-10-CM

## 2018-05-30 DIAGNOSIS — R35.0 BENIGN PROSTATIC HYPERPLASIA WITH URINARY FREQUENCY: ICD-10-CM

## 2018-05-30 DIAGNOSIS — I48.21 PERMANENT ATRIAL FIBRILLATION (HCC): ICD-10-CM

## 2018-05-30 DIAGNOSIS — N20.0 NEPHROLITHIASIS: Primary | ICD-10-CM

## 2018-05-30 LAB — POC INR: 3 (ref 0.8–1.2)

## 2018-05-30 PROCEDURE — 4040F PNEUMOC VAC/ADMIN/RCVD: CPT | Performed by: NURSE PRACTITIONER

## 2018-05-30 PROCEDURE — 99213 OFFICE O/P EST LOW 20 MIN: CPT | Performed by: NURSE PRACTITIONER

## 2018-05-30 PROCEDURE — 3017F COLORECTAL CA SCREEN DOC REV: CPT | Performed by: NURSE PRACTITIONER

## 2018-05-30 PROCEDURE — G8417 CALC BMI ABV UP PARAM F/U: HCPCS | Performed by: NURSE PRACTITIONER

## 2018-05-30 PROCEDURE — 36416 COLLJ CAPILLARY BLOOD SPEC: CPT

## 2018-05-30 PROCEDURE — 99211 OFF/OP EST MAY X REQ PHY/QHP: CPT

## 2018-05-30 PROCEDURE — 1123F ACP DISCUSS/DSCN MKR DOCD: CPT | Performed by: NURSE PRACTITIONER

## 2018-05-30 PROCEDURE — 1036F TOBACCO NON-USER: CPT | Performed by: NURSE PRACTITIONER

## 2018-05-30 PROCEDURE — 85610 PROTHROMBIN TIME: CPT

## 2018-05-30 PROCEDURE — G8427 DOCREV CUR MEDS BY ELIG CLIN: HCPCS | Performed by: NURSE PRACTITIONER

## 2018-06-05 ENCOUNTER — OFFICE VISIT (OUTPATIENT)
Dept: CARDIOLOGY CLINIC | Age: 68
End: 2018-06-05
Payer: MEDICARE

## 2018-06-05 VITALS
HEIGHT: 72 IN | DIASTOLIC BLOOD PRESSURE: 66 MMHG | WEIGHT: 293.5 LBS | HEART RATE: 82 BPM | SYSTOLIC BLOOD PRESSURE: 120 MMHG | BODY MASS INDEX: 39.75 KG/M2

## 2018-06-05 DIAGNOSIS — I50.32 CHRONIC DIASTOLIC HEART FAILURE (HCC): ICD-10-CM

## 2018-06-05 DIAGNOSIS — I48.91 ATRIAL FIBRILLATION, UNSPECIFIED TYPE (HCC): Primary | ICD-10-CM

## 2018-06-05 PROCEDURE — 1123F ACP DISCUSS/DSCN MKR DOCD: CPT | Performed by: INTERNAL MEDICINE

## 2018-06-05 PROCEDURE — 4040F PNEUMOC VAC/ADMIN/RCVD: CPT | Performed by: INTERNAL MEDICINE

## 2018-06-05 PROCEDURE — 3017F COLORECTAL CA SCREEN DOC REV: CPT | Performed by: INTERNAL MEDICINE

## 2018-06-05 PROCEDURE — 93000 ELECTROCARDIOGRAM COMPLETE: CPT | Performed by: INTERNAL MEDICINE

## 2018-06-05 PROCEDURE — G8417 CALC BMI ABV UP PARAM F/U: HCPCS | Performed by: INTERNAL MEDICINE

## 2018-06-05 PROCEDURE — 99213 OFFICE O/P EST LOW 20 MIN: CPT | Performed by: INTERNAL MEDICINE

## 2018-06-05 PROCEDURE — 1036F TOBACCO NON-USER: CPT | Performed by: INTERNAL MEDICINE

## 2018-06-05 PROCEDURE — G8427 DOCREV CUR MEDS BY ELIG CLIN: HCPCS | Performed by: INTERNAL MEDICINE

## 2018-06-05 RX ORDER — FUROSEMIDE 40 MG/1
20 TABLET ORAL DAILY
Qty: 60 TABLET | Refills: 3 | Status: ON HOLD | OUTPATIENT
Start: 2018-06-05 | End: 2018-07-17 | Stop reason: HOSPADM

## 2018-06-10 RX ORDER — METOPROLOL TARTRATE 50 MG/1
TABLET, FILM COATED ORAL
Qty: 180 TABLET | Refills: 1 | Status: ON HOLD | OUTPATIENT
Start: 2018-06-10 | End: 2018-07-17 | Stop reason: HOSPADM

## 2018-06-11 ENCOUNTER — PREP FOR PROCEDURE (OUTPATIENT)
Dept: CARDIOTHORACIC SURGERY | Age: 68
End: 2018-06-11

## 2018-06-11 DIAGNOSIS — Z01.818 PREOP TESTING: Primary | ICD-10-CM

## 2018-06-11 DIAGNOSIS — R94.5 ABNORMAL RESULTS OF LIVER FUNCTION STUDIES: ICD-10-CM

## 2018-06-11 DIAGNOSIS — R79.1 ABNORMAL COAGULATION PROFILE: ICD-10-CM

## 2018-06-11 RX ORDER — SODIUM CHLORIDE 0.9 % (FLUSH) 0.9 %
10 SYRINGE (ML) INJECTION EVERY 12 HOURS SCHEDULED
Status: CANCELLED | OUTPATIENT
Start: 2018-06-11

## 2018-06-11 RX ORDER — AMIODARONE HYDROCHLORIDE 200 MG/1
200 TABLET ORAL 2 TIMES DAILY
Qty: 60 TABLET | Refills: 3 | Status: ON HOLD | OUTPATIENT
Start: 2018-06-11 | End: 2018-07-17 | Stop reason: HOSPADM

## 2018-06-11 RX ORDER — SODIUM CHLORIDE 0.9 % (FLUSH) 0.9 %
10 SYRINGE (ML) INJECTION PRN
Status: CANCELLED | OUTPATIENT
Start: 2018-06-11

## 2018-06-11 RX ORDER — CHLORHEXIDINE GLUCONATE 4 G/100ML
SOLUTION TOPICAL ONCE
Status: CANCELLED | OUTPATIENT
Start: 2018-06-11 | End: 2018-06-11

## 2018-06-11 RX ORDER — AMIODARONE HYDROCHLORIDE 200 MG/1
200 TABLET ORAL ONCE
Status: CANCELLED | OUTPATIENT
Start: 2018-06-11 | End: 2018-06-11

## 2018-06-11 RX ORDER — CHLORHEXIDINE GLUCONATE 0.12 MG/ML
15 RINSE ORAL ONCE
Status: CANCELLED | OUTPATIENT
Start: 2018-06-11 | End: 2018-06-11

## 2018-06-18 ENCOUNTER — TELEPHONE (OUTPATIENT)
Dept: CARDIOLOGY CLINIC | Age: 68
End: 2018-06-18

## 2018-06-21 ENCOUNTER — TELEPHONE (OUTPATIENT)
Dept: PHARMACY | Age: 68
End: 2018-06-21

## 2018-06-25 ENCOUNTER — HOSPITAL ENCOUNTER (OUTPATIENT)
Dept: GENERAL RADIOLOGY | Age: 68
Discharge: HOME | DRG: 228 | End: 2018-06-25
Attending: THORACIC SURGERY (CARDIOTHORACIC VASCULAR SURGERY)
Payer: MEDICARE

## 2018-06-25 ENCOUNTER — HOSPITAL ENCOUNTER (OUTPATIENT)
Dept: PREADMISSION TESTING | Age: 68
Discharge: HOME | DRG: 228 | End: 2018-06-29
Payer: MEDICARE

## 2018-06-25 ENCOUNTER — TELEPHONE (OUTPATIENT)
Dept: CARDIOLOGY CLINIC | Age: 68
End: 2018-06-25

## 2018-06-25 VITALS
HEART RATE: 68 BPM | BODY MASS INDEX: 40.25 KG/M2 | DIASTOLIC BLOOD PRESSURE: 55 MMHG | RESPIRATION RATE: 16 BRPM | OXYGEN SATURATION: 96 % | HEIGHT: 72 IN | WEIGHT: 297.18 LBS | SYSTOLIC BLOOD PRESSURE: 86 MMHG | TEMPERATURE: 97.7 F

## 2018-06-25 DIAGNOSIS — R79.1 ABNORMAL COAGULATION PROFILE: ICD-10-CM

## 2018-06-25 DIAGNOSIS — Z01.818 PREOP TESTING: ICD-10-CM

## 2018-06-25 DIAGNOSIS — R94.5 ABNORMAL RESULTS OF LIVER FUNCTION STUDIES: ICD-10-CM

## 2018-06-25 LAB
ABO: NORMAL
ANION GAP SERPL CALCULATED.3IONS-SCNC: 12 MEQ/L (ref 8–16)
ANTIBODY SCREEN: NORMAL
APTT: 39 SECONDS (ref 22–38)
BUN BLDV-MCNC: 16 MG/DL (ref 7–22)
CALCIUM SERPL-MCNC: 9.6 MG/DL (ref 8.5–10.5)
CHLORIDE BLD-SCNC: 99 MEQ/L (ref 98–111)
CO2: 31 MEQ/L (ref 23–33)
CREAT SERPL-MCNC: 0.9 MG/DL (ref 0.4–1.2)
GFR SERPL CREATININE-BSD FRML MDRD: 84 ML/MIN/1.73M2
GLUCOSE BLD-MCNC: 97 MG/DL (ref 70–108)
HCT VFR BLD CALC: 43.3 % (ref 42–52)
HEMOGLOBIN: 14.7 GM/DL (ref 14–18)
INR BLD: 1.85 (ref 0.85–1.13)
MCH RBC QN AUTO: 28.8 PG (ref 27–31)
MCHC RBC AUTO-ENTMCNC: 33.9 GM/DL (ref 33–37)
MCV RBC AUTO: 85.1 FL (ref 80–94)
PDW BLD-RTO: 14.7 % (ref 11.5–14.5)
PLATELET # BLD: 266 THOU/MM3 (ref 130–400)
PMV BLD AUTO: 8.9 FL (ref 7.4–10.4)
POTASSIUM REFLEX MAGNESIUM: 3.6 MEQ/L (ref 3.5–5.2)
RBC # BLD: 5.09 MILL/MM3 (ref 4.7–6.1)
RH FACTOR: NORMAL
SODIUM BLD-SCNC: 142 MEQ/L (ref 135–145)
WBC # BLD: 9.5 THOU/MM3 (ref 4.8–10.8)

## 2018-06-25 PROCEDURE — 71046 X-RAY EXAM CHEST 2 VIEWS: CPT

## 2018-06-25 PROCEDURE — 85027 COMPLETE CBC AUTOMATED: CPT

## 2018-06-25 PROCEDURE — 86901 BLOOD TYPING SEROLOGIC RH(D): CPT

## 2018-06-25 PROCEDURE — 86923 COMPATIBILITY TEST ELECTRIC: CPT

## 2018-06-25 PROCEDURE — 85730 THROMBOPLASTIN TIME PARTIAL: CPT

## 2018-06-25 PROCEDURE — 36415 COLL VENOUS BLD VENIPUNCTURE: CPT

## 2018-06-25 PROCEDURE — 80048 BASIC METABOLIC PNL TOTAL CA: CPT

## 2018-06-25 PROCEDURE — 86850 RBC ANTIBODY SCREEN: CPT

## 2018-06-25 PROCEDURE — 85610 PROTHROMBIN TIME: CPT

## 2018-06-25 PROCEDURE — 87081 CULTURE SCREEN ONLY: CPT

## 2018-06-25 PROCEDURE — 86900 BLOOD TYPING SEROLOGIC ABO: CPT

## 2018-06-26 LAB — MRSA SCREEN: NORMAL

## 2018-06-27 LAB
MRSA SCREEN: NORMAL
VRE CULTURE: NORMAL

## 2018-07-03 ENCOUNTER — TELEPHONE (OUTPATIENT)
Dept: PHARMACY | Age: 68
End: 2018-07-03

## 2018-07-03 NOTE — TELEPHONE ENCOUNTER
Neena Case called stating that his procedure scheduled for 6/28/18 was postponed to 07/12/18. Since he had only been off Coumadin 3 days, Neena Case took 5 mg on 06/25/18 and then resumed 2.5 mg. He wanted to touch base and let us know what has been going on. Please call the pt at [phone].

## 2018-07-03 NOTE — TELEPHONE ENCOUNTER
Advised pt to hold warfarin as of 7/6/18. Once OK'd by cardiologist to resume warfarin, take 5 mg daily x 2 days, then resume 2.5 mg daily. Recheck INR 7/19/18. Pt repeated back instructions.

## 2018-07-12 ENCOUNTER — ANESTHESIA EVENT (OUTPATIENT)
Dept: OPERATING ROOM | Age: 68
DRG: 228 | End: 2018-07-12
Payer: MEDICARE

## 2018-07-12 ENCOUNTER — ANESTHESIA (OUTPATIENT)
Dept: OPERATING ROOM | Age: 68
DRG: 228 | End: 2018-07-12
Payer: MEDICARE

## 2018-07-12 ENCOUNTER — HOSPITAL ENCOUNTER (INPATIENT)
Age: 68
LOS: 5 days | Discharge: HOME | DRG: 228 | End: 2018-07-17
Attending: THORACIC SURGERY (CARDIOTHORACIC VASCULAR SURGERY) | Admitting: THORACIC SURGERY (CARDIOTHORACIC VASCULAR SURGERY)
Payer: MEDICARE

## 2018-07-12 ENCOUNTER — APPOINTMENT (OUTPATIENT)
Dept: GENERAL RADIOLOGY | Age: 68
DRG: 228 | End: 2018-07-12
Attending: THORACIC SURGERY (CARDIOTHORACIC VASCULAR SURGERY)
Payer: MEDICARE

## 2018-07-12 VITALS
TEMPERATURE: 99.7 F | SYSTOLIC BLOOD PRESSURE: 106 MMHG | RESPIRATION RATE: 18 BRPM | OXYGEN SATURATION: 98 % | DIASTOLIC BLOOD PRESSURE: 54 MMHG

## 2018-07-12 DIAGNOSIS — Z86.79 S/P MAZE OPERATION FOR ATRIAL FIBRILLATION: Primary | ICD-10-CM

## 2018-07-12 DIAGNOSIS — Z98.890 S/P MAZE OPERATION FOR ATRIAL FIBRILLATION: Primary | ICD-10-CM

## 2018-07-12 PROBLEM — I48.91 ATRIAL FIBRILLATION (HCC): Status: ACTIVE | Noted: 2018-07-12

## 2018-07-12 LAB
ABO: NORMAL
ALLEN TEST: ABNORMAL
ANION GAP SERPL CALCULATED.3IONS-SCNC: 17 MEQ/L (ref 8–16)
ANTIBODY SCREEN: NORMAL
BASE EXCESS (CALCULATED): -1.8 MMOL/L (ref -2.5–2.5)
BASE EXCESS (CALCULATED): -1.8 MMOL/L (ref -2.5–2.5)
BASE EXCESS (CALCULATED): -2.8 MMOL/L (ref -2.5–2.5)
BASE EXCESS (CALCULATED): 3.9 MMOL/L (ref -2.5–2.5)
BASE EXCESS MIXED: -2.6 MMOL/L (ref -2–3)
BUN BLDV-MCNC: 19 MG/DL (ref 7–22)
CALCIUM IONIZED SERUM: 1.08 MMOL/L (ref 1.12–1.32)
CALCIUM IONIZED SERUM: 1.15 MMOL/L (ref 1.12–1.32)
CALCIUM IONIZED: 1.06 MMOL/L (ref 1.12–1.32)
CALCIUM SERPL-MCNC: 8.6 MG/DL (ref 8.5–10.5)
CHLORIDE BLD-SCNC: 100 MEQ/L (ref 98–111)
CO2: 22 MEQ/L (ref 23–33)
COLLECTED BY:: ABNORMAL
CREAT SERPL-MCNC: 1.1 MG/DL (ref 0.4–1.2)
DEVICE: ABNORMAL
ERYTHROCYTE [DISTWIDTH] IN BLOOD BY AUTOMATED COUNT: 13.9 % (ref 11.5–14.5)
ERYTHROCYTE [DISTWIDTH] IN BLOOD BY AUTOMATED COUNT: 13.9 % (ref 11.5–14.5)
ERYTHROCYTE [DISTWIDTH] IN BLOOD BY AUTOMATED COUNT: 44.4 FL (ref 35–45)
ERYTHROCYTE [DISTWIDTH] IN BLOOD BY AUTOMATED COUNT: 45.6 FL (ref 35–45)
FIO2, MIXED VENOUS: 50
GFR SERPL CREATININE-BSD FRML MDRD: 66 ML/MIN/1.73M2
GLUCOSE BLD-MCNC: 152 MG/DL (ref 70–108)
GLUCOSE BLD-MCNC: 202 MG/DL (ref 70–108)
GLUCOSE BLD-MCNC: 226 MG/DL (ref 70–108)
GLUCOSE BLD-MCNC: 271 MG/DL (ref 70–108)
GLUCOSE BLD-MCNC: 307 MG/DL (ref 70–108)
GLUCOSE BLD-MCNC: 309 MG/DL (ref 70–108)
GLUCOSE, WHOLE BLOOD: 111 MG/DL (ref 70–108)
GLUCOSE, WHOLE BLOOD: 316 MG/DL (ref 70–108)
GLUCOSE, WHOLE BLOOD: 327 MG/DL (ref 70–108)
GLUCOSE, WHOLE BLOOD: 363 MG/DL (ref 70–108)
HCO3, MIXED: 23 MMOL/L (ref 23–28)
HCO3: 23 MMOL/L (ref 23–28)
HCO3: 25 MMOL/L (ref 23–28)
HCO3: 26 MMOL/L (ref 23–28)
HCO3: 28 MMOL/L (ref 23–28)
HCT VFR BLD CALC: 40.9 % (ref 42–52)
HCT VFR BLD CALC: 42.1 % (ref 42–52)
HEMOGLOBIN FINGERSTICK, POC: 12.4 G/DL (ref 14–18)
HEMOGLOBIN FINGERSTICK, POC: 13.3 G/DL (ref 14–18)
HEMOGLOBIN: 13.6 GM/DL (ref 14–18)
HEMOGLOBIN: 13.7 GM/DL (ref 14–18)
IFIO2: 45
IFIO2: 80
INR BLD: 1.37 (ref 0.85–1.13)
MAGNESIUM: 1.8 MG/DL (ref 1.6–2.4)
MCH RBC QN AUTO: 29.1 PG (ref 26–33)
MCH RBC QN AUTO: 29.3 PG (ref 26–33)
MCHC RBC AUTO-ENTMCNC: 32.5 GM/DL (ref 32.2–35.5)
MCHC RBC AUTO-ENTMCNC: 33.3 GM/DL (ref 32.2–35.5)
MCV RBC AUTO: 87.4 FL (ref 80–94)
MCV RBC AUTO: 90.1 FL (ref 80–94)
MODE: ABNORMAL
MODE: AC
MODE: AC
O2 SAT, MIXED: 97 %
O2 SATURATION: 100 %
O2 SATURATION: 91 %
O2 SATURATION: 97 %
O2 SATURATION: 99 %
PCO2, MIXED VENOUS: 40 MMHG (ref 41–51)
PCO2: 41 MMHG (ref 35–45)
PCO2: 42 MMHG (ref 35–45)
PCO2: 51 MMHG (ref 35–45)
PCO2: 53 MMHG (ref 35–45)
PH BLOOD GAS: 7.29 (ref 7.35–7.45)
PH BLOOD GAS: 7.31 (ref 7.35–7.45)
PH BLOOD GAS: 7.34 (ref 7.35–7.45)
PH BLOOD GAS: 7.44 (ref 7.35–7.45)
PH, MIXED: 7.36 (ref 7.31–7.41)
PLATELET # BLD: 253 THOU/MM3 (ref 130–400)
PLATELET # BLD: 268 THOU/MM3 (ref 130–400)
PMV BLD AUTO: 10.6 FL (ref 9.4–12.4)
PMV BLD AUTO: 9.9 FL (ref 9.4–12.4)
PO2 MIXED: 91 MMHG (ref 25–40)
PO2: 127 MMHG (ref 71–104)
PO2: 254 MMHG (ref 71–104)
PO2: 58 MMHG (ref 71–104)
PO2: 98 MMHG (ref 71–104)
POC ACTIVATED CLOTTING TIME KAOLIN: 147 SECONDS (ref 1–150)
POTASSIUM SERPL-SCNC: 4.2 MEQ/L (ref 3.5–5.2)
POTASSIUM SERPL-SCNC: 4.7 MEQ/L (ref 3.5–5.2)
POTASSIUM SERPL-SCNC: 5.4 MEQ/L (ref 3.5–5.2)
POTASSIUM, WHOLE BLOOD: 3.5 MEQ/L (ref 3.5–4.9)
POTASSIUM, WHOLE BLOOD: 5.3 MEQ/L (ref 3.5–4.9)
RBC # BLD: 4.67 MILL/MM3 (ref 4.7–6.1)
RBC # BLD: 4.68 MILL/MM3 (ref 4.7–6.1)
RH FACTOR: NORMAL
SET PEEP: 5 MMHG
SET PRESS SUPP: 10 CMH2O
SET RESPIRATORY RATE: 12 BPM
SET RESPIRATORY RATE: 17 BPM
SET TIDAL VOLUME: 590 ML
SET TIDAL VOLUME: 590 ML
SITE: ABNORMAL
SODIUM BLD-SCNC: 139 MEQ/L (ref 135–145)
SODIUM, WHOLE BLOOD: 138 MEQ/L (ref 138–146)
SODIUM, WHOLE BLOOD: 142 MEQ/L (ref 138–146)
SOURCE, BLOOD GAS: ABNORMAL
WBC # BLD: 24.4 THOU/MM3 (ref 4.8–10.8)
WBC # BLD: 27.5 THOU/MM3 (ref 4.8–10.8)

## 2018-07-12 PROCEDURE — 3700000001 HC ADD 15 MINUTES (ANESTHESIA): Performed by: THORACIC SURGERY (CARDIOTHORACIC VASCULAR SURGERY)

## 2018-07-12 PROCEDURE — 82947 ASSAY GLUCOSE BLOOD QUANT: CPT

## 2018-07-12 PROCEDURE — B246ZZ4 ULTRASONOGRAPHY OF RIGHT AND LEFT HEART, TRANSESOPHAGEAL: ICD-10-PCS | Performed by: THORACIC SURGERY (CARDIOTHORACIC VASCULAR SURGERY)

## 2018-07-12 PROCEDURE — 82330 ASSAY OF CALCIUM: CPT

## 2018-07-12 PROCEDURE — 82948 REAGENT STRIP/BLOOD GLUCOSE: CPT

## 2018-07-12 PROCEDURE — 37799 UNLISTED PX VASCULAR SURGERY: CPT

## 2018-07-12 PROCEDURE — 86900 BLOOD TYPING SEROLOGIC ABO: CPT

## 2018-07-12 PROCEDURE — 82803 BLOOD GASES ANY COMBINATION: CPT

## 2018-07-12 PROCEDURE — 6360000002 HC RX W HCPCS: Performed by: NURSE ANESTHETIST, CERTIFIED REGISTERED

## 2018-07-12 PROCEDURE — 85027 COMPLETE CBC AUTOMATED: CPT

## 2018-07-12 PROCEDURE — 33266 ABLATE ATRIA X10SV ENDO: CPT | Performed by: THORACIC SURGERY (CARDIOTHORACIC VASCULAR SURGERY)

## 2018-07-12 PROCEDURE — 71045 X-RAY EXAM CHEST 1 VIEW: CPT

## 2018-07-12 PROCEDURE — S0028 INJECTION, FAMOTIDINE, 20 MG: HCPCS | Performed by: THORACIC SURGERY (CARDIOTHORACIC VASCULAR SURGERY)

## 2018-07-12 PROCEDURE — 6370000000 HC RX 637 (ALT 250 FOR IP): Performed by: THORACIC SURGERY (CARDIOTHORACIC VASCULAR SURGERY)

## 2018-07-12 PROCEDURE — 86901 BLOOD TYPING SEROLOGIC RH(D): CPT

## 2018-07-12 PROCEDURE — 2500000003 HC RX 250 WO HCPCS: Performed by: THORACIC SURGERY (CARDIOTHORACIC VASCULAR SURGERY)

## 2018-07-12 PROCEDURE — 85018 HEMOGLOBIN: CPT

## 2018-07-12 PROCEDURE — 2780000010 HC IMPLANT OTHER: Performed by: THORACIC SURGERY (CARDIOTHORACIC VASCULAR SURGERY)

## 2018-07-12 PROCEDURE — 02584ZZ DESTRUCTION OF CONDUCTION MECHANISM, PERCUTANEOUS ENDOSCOPIC APPROACH: ICD-10-PCS | Performed by: THORACIC SURGERY (CARDIOTHORACIC VASCULAR SURGERY)

## 2018-07-12 PROCEDURE — 84132 ASSAY OF SERUM POTASSIUM: CPT

## 2018-07-12 PROCEDURE — 2580000003 HC RX 258: Performed by: NURSE ANESTHETIST, CERTIFIED REGISTERED

## 2018-07-12 PROCEDURE — 80048 BASIC METABOLIC PNL TOTAL CA: CPT

## 2018-07-12 PROCEDURE — 83735 ASSAY OF MAGNESIUM: CPT

## 2018-07-12 PROCEDURE — 3600000018 HC SURGERY OHS ADDTL 15MIN: Performed by: THORACIC SURGERY (CARDIOTHORACIC VASCULAR SURGERY)

## 2018-07-12 PROCEDURE — 36591 DRAW BLOOD OFF VENOUS DEVICE: CPT

## 2018-07-12 PROCEDURE — 84295 ASSAY OF SERUM SODIUM: CPT

## 2018-07-12 PROCEDURE — 2580000003 HC RX 258: Performed by: THORACIC SURGERY (CARDIOTHORACIC VASCULAR SURGERY)

## 2018-07-12 PROCEDURE — 2500000003 HC RX 250 WO HCPCS: Performed by: NURSE ANESTHETIST, CERTIFIED REGISTERED

## 2018-07-12 PROCEDURE — C1894 INTRO/SHEATH, NON-LASER: HCPCS | Performed by: THORACIC SURGERY (CARDIOTHORACIC VASCULAR SURGERY)

## 2018-07-12 PROCEDURE — 2720000010 HC SURG SUPPLY STERILE: Performed by: THORACIC SURGERY (CARDIOTHORACIC VASCULAR SURGERY)

## 2018-07-12 PROCEDURE — 02L70ZK OCCLUSION OF LEFT ATRIAL APPENDAGE, OPEN APPROACH: ICD-10-PCS | Performed by: THORACIC SURGERY (CARDIOTHORACIC VASCULAR SURGERY)

## 2018-07-12 PROCEDURE — 93005 ELECTROCARDIOGRAM TRACING: CPT | Performed by: THORACIC SURGERY (CARDIOTHORACIC VASCULAR SURGERY)

## 2018-07-12 PROCEDURE — 2700000000 HC OXYGEN THERAPY PER DAY

## 2018-07-12 PROCEDURE — 94002 VENT MGMT INPAT INIT DAY: CPT

## 2018-07-12 PROCEDURE — 6360000002 HC RX W HCPCS: Performed by: NURSE PRACTITIONER

## 2018-07-12 PROCEDURE — 3600000008 HC SURGERY OHS BASE: Performed by: THORACIC SURGERY (CARDIOTHORACIC VASCULAR SURGERY)

## 2018-07-12 PROCEDURE — 36415 COLL VENOUS BLD VENIPUNCTURE: CPT

## 2018-07-12 PROCEDURE — 86850 RBC ANTIBODY SCREEN: CPT

## 2018-07-12 PROCEDURE — C1773 RET DEV, INSERTABLE: HCPCS | Performed by: THORACIC SURGERY (CARDIOTHORACIC VASCULAR SURGERY)

## 2018-07-12 PROCEDURE — 3700000000 HC ANESTHESIA ATTENDED CARE: Performed by: THORACIC SURGERY (CARDIOTHORACIC VASCULAR SURGERY)

## 2018-07-12 PROCEDURE — 85347 COAGULATION TIME ACTIVATED: CPT

## 2018-07-12 PROCEDURE — 36592 COLLECT BLOOD FROM PICC: CPT

## 2018-07-12 PROCEDURE — 6370000000 HC RX 637 (ALT 250 FOR IP): Performed by: NURSE PRACTITIONER

## 2018-07-12 PROCEDURE — 86923 COMPATIBILITY TEST ELECTRIC: CPT

## 2018-07-12 PROCEDURE — 6360000002 HC RX W HCPCS: Performed by: THORACIC SURGERY (CARDIOTHORACIC VASCULAR SURGERY)

## 2018-07-12 PROCEDURE — 85610 PROTHROMBIN TIME: CPT

## 2018-07-12 PROCEDURE — 2000000000 HC ICU R&B

## 2018-07-12 DEVICE — DEVICE OCCL CLP L40MM PRELD FOR THORACOSCOPIC PROC GILLINOV: Type: IMPLANTABLE DEVICE | Status: FUNCTIONAL

## 2018-07-12 RX ORDER — ENALAPRILAT 2.5 MG/2ML
0.62 INJECTION INTRAVENOUS
Status: DISCONTINUED | OUTPATIENT
Start: 2018-07-12 | End: 2018-07-13

## 2018-07-12 RX ORDER — ACETAMINOPHEN 650 MG/1
650 SUPPOSITORY RECTAL EVERY 4 HOURS PRN
Status: DISCONTINUED | OUTPATIENT
Start: 2018-07-12 | End: 2018-07-13

## 2018-07-12 RX ORDER — PROPOFOL 10 MG/ML
INJECTION, EMULSION INTRAVENOUS PRN
Status: DISCONTINUED | OUTPATIENT
Start: 2018-07-12 | End: 2018-07-12 | Stop reason: SDUPTHER

## 2018-07-12 RX ORDER — M-VIT,TX,IRON,MINS/CALC/FOLIC 27MG-0.4MG
1 TABLET ORAL
Status: DISCONTINUED | OUTPATIENT
Start: 2018-07-13 | End: 2018-07-17 | Stop reason: HOSPADM

## 2018-07-12 RX ORDER — ALBUMIN, HUMAN INJ 5% 5 %
25 SOLUTION INTRAVENOUS PRN
Status: DISCONTINUED | OUTPATIENT
Start: 2018-07-12 | End: 2018-07-13

## 2018-07-12 RX ORDER — SODIUM CHLORIDE 0.9 % (FLUSH) 0.9 %
10 SYRINGE (ML) INJECTION EVERY 12 HOURS SCHEDULED
Status: DISCONTINUED | OUTPATIENT
Start: 2018-07-12 | End: 2018-07-12 | Stop reason: HOSPADM

## 2018-07-12 RX ORDER — OXYCODONE HYDROCHLORIDE 5 MG/1
5 TABLET ORAL EVERY 4 HOURS PRN
Status: DISCONTINUED | OUTPATIENT
Start: 2018-07-12 | End: 2018-07-17 | Stop reason: HOSPADM

## 2018-07-12 RX ORDER — EPHEDRINE SULFATE 50 MG/ML
INJECTION, SOLUTION INTRAVENOUS PRN
Status: DISCONTINUED | OUTPATIENT
Start: 2018-07-12 | End: 2018-07-12 | Stop reason: SDUPTHER

## 2018-07-12 RX ORDER — SODIUM CHLORIDE 9 MG/ML
INJECTION, SOLUTION INTRAVENOUS CONTINUOUS
Status: DISCONTINUED | OUTPATIENT
Start: 2018-07-12 | End: 2018-07-17 | Stop reason: HOSPADM

## 2018-07-12 RX ORDER — BISACODYL 10 MG
10 SUPPOSITORY, RECTAL RECTAL DAILY PRN
Status: DISCONTINUED | OUTPATIENT
Start: 2018-07-12 | End: 2018-07-17 | Stop reason: HOSPADM

## 2018-07-12 RX ORDER — SODIUM CHLORIDE 0.9 % (FLUSH) 0.9 %
10 SYRINGE (ML) INJECTION EVERY 12 HOURS SCHEDULED
Status: DISCONTINUED | OUTPATIENT
Start: 2018-07-12 | End: 2018-07-17 | Stop reason: HOSPADM

## 2018-07-12 RX ORDER — POTASSIUM CHLORIDE 29.8 MG/ML
20 INJECTION INTRAVENOUS PRN
Status: DISCONTINUED | OUTPATIENT
Start: 2018-07-13 | End: 2018-07-13

## 2018-07-12 RX ORDER — SODIUM CHLORIDE, SODIUM LACTATE, POTASSIUM CHLORIDE, CALCIUM CHLORIDE 600; 310; 30; 20 MG/100ML; MG/100ML; MG/100ML; MG/100ML
INJECTION, SOLUTION INTRAVENOUS CONTINUOUS PRN
Status: DISCONTINUED | OUTPATIENT
Start: 2018-07-12 | End: 2018-07-12 | Stop reason: SDUPTHER

## 2018-07-12 RX ORDER — NICOTINE POLACRILEX 4 MG
15 LOZENGE BUCCAL PRN
Status: DISCONTINUED | OUTPATIENT
Start: 2018-07-12 | End: 2018-07-13

## 2018-07-12 RX ORDER — AMIODARONE HYDROCHLORIDE 200 MG/1
200 TABLET ORAL 2 TIMES DAILY
Status: DISCONTINUED | OUTPATIENT
Start: 2018-07-12 | End: 2018-07-17 | Stop reason: HOSPADM

## 2018-07-12 RX ORDER — SODIUM CHLORIDE 0.9 % (FLUSH) 0.9 %
10 SYRINGE (ML) INJECTION PRN
Status: DISCONTINUED | OUTPATIENT
Start: 2018-07-12 | End: 2018-07-12 | Stop reason: HOSPADM

## 2018-07-12 RX ORDER — ONDANSETRON 2 MG/ML
4 INJECTION INTRAMUSCULAR; INTRAVENOUS EVERY 6 HOURS PRN
Status: DISCONTINUED | OUTPATIENT
Start: 2018-07-12 | End: 2018-07-17 | Stop reason: HOSPADM

## 2018-07-12 RX ORDER — ROCURONIUM BROMIDE 10 MG/ML
INJECTION, SOLUTION INTRAVENOUS PRN
Status: DISCONTINUED | OUTPATIENT
Start: 2018-07-12 | End: 2018-07-12 | Stop reason: SDUPTHER

## 2018-07-12 RX ORDER — CHLORHEXIDINE GLUCONATE 4 G/100ML
SOLUTION TOPICAL ONCE
Status: DISCONTINUED | OUTPATIENT
Start: 2018-07-12 | End: 2018-07-12 | Stop reason: HOSPADM

## 2018-07-12 RX ORDER — METOPROLOL TARTRATE 5 MG/5ML
2.5 INJECTION INTRAVENOUS EVERY 10 MIN PRN
Status: DISCONTINUED | OUTPATIENT
Start: 2018-07-12 | End: 2018-07-13

## 2018-07-12 RX ORDER — DOCUSATE SODIUM 100 MG/1
100 CAPSULE, LIQUID FILLED ORAL 2 TIMES DAILY
Status: DISCONTINUED | OUTPATIENT
Start: 2018-07-13 | End: 2018-07-17 | Stop reason: HOSPADM

## 2018-07-12 RX ORDER — DEXTROSE MONOHYDRATE 25 G/50ML
12.5 INJECTION, SOLUTION INTRAVENOUS PRN
Status: DISCONTINUED | OUTPATIENT
Start: 2018-07-12 | End: 2018-07-13

## 2018-07-12 RX ORDER — SODIUM CHLORIDE 0.9 % (FLUSH) 0.9 %
10 SYRINGE (ML) INJECTION PRN
Status: DISCONTINUED | OUTPATIENT
Start: 2018-07-12 | End: 2018-07-17 | Stop reason: HOSPADM

## 2018-07-12 RX ORDER — LIDOCAINE HYDROCHLORIDE 20 MG/ML
INJECTION, SOLUTION INFILTRATION; PERINEURAL PRN
Status: DISCONTINUED | OUTPATIENT
Start: 2018-07-12 | End: 2018-07-12 | Stop reason: SDUPTHER

## 2018-07-12 RX ORDER — ACETAMINOPHEN 325 MG/1
650 TABLET ORAL EVERY 4 HOURS PRN
Status: DISCONTINUED | OUTPATIENT
Start: 2018-07-12 | End: 2018-07-17 | Stop reason: HOSPADM

## 2018-07-12 RX ORDER — MIDAZOLAM HYDROCHLORIDE 1 MG/ML
INJECTION INTRAMUSCULAR; INTRAVENOUS PRN
Status: DISCONTINUED | OUTPATIENT
Start: 2018-07-12 | End: 2018-07-12 | Stop reason: SDUPTHER

## 2018-07-12 RX ORDER — FAMOTIDINE 20 MG/1
20 TABLET, FILM COATED ORAL 2 TIMES DAILY
Status: DISCONTINUED | OUTPATIENT
Start: 2018-07-14 | End: 2018-07-17 | Stop reason: HOSPADM

## 2018-07-12 RX ORDER — AMIODARONE HYDROCHLORIDE 200 MG/1
200 TABLET ORAL ONCE
Status: DISCONTINUED | OUTPATIENT
Start: 2018-07-12 | End: 2018-07-12 | Stop reason: HOSPADM

## 2018-07-12 RX ORDER — SODIUM CHLORIDE 9 MG/ML
INJECTION, SOLUTION INTRAVENOUS CONTINUOUS PRN
Status: DISCONTINUED | OUTPATIENT
Start: 2018-07-12 | End: 2018-07-12 | Stop reason: SDUPTHER

## 2018-07-12 RX ORDER — FENTANYL CITRATE 50 UG/ML
INJECTION, SOLUTION INTRAMUSCULAR; INTRAVENOUS PRN
Status: DISCONTINUED | OUTPATIENT
Start: 2018-07-12 | End: 2018-07-12 | Stop reason: SDUPTHER

## 2018-07-12 RX ORDER — ATORVASTATIN CALCIUM 20 MG/1
20 TABLET, FILM COATED ORAL NIGHTLY
Status: DISCONTINUED | OUTPATIENT
Start: 2018-07-13 | End: 2018-07-17 | Stop reason: HOSPADM

## 2018-07-12 RX ORDER — OXYCODONE HYDROCHLORIDE 5 MG/1
10 TABLET ORAL EVERY 4 HOURS PRN
Status: DISCONTINUED | OUTPATIENT
Start: 2018-07-12 | End: 2018-07-17 | Stop reason: HOSPADM

## 2018-07-12 RX ORDER — DEXTROSE MONOHYDRATE 50 MG/ML
100 INJECTION, SOLUTION INTRAVENOUS PRN
Status: DISCONTINUED | OUTPATIENT
Start: 2018-07-12 | End: 2018-07-13

## 2018-07-12 RX ORDER — SENNOSIDES 8.8 MG/5ML
10 LIQUID ORAL DAILY PRN
Status: DISCONTINUED | OUTPATIENT
Start: 2018-07-12 | End: 2018-07-17 | Stop reason: HOSPADM

## 2018-07-12 RX ORDER — MORPHINE SULFATE 2 MG/ML
2 INJECTION, SOLUTION INTRAMUSCULAR; INTRAVENOUS
Status: DISCONTINUED | OUTPATIENT
Start: 2018-07-12 | End: 2018-07-13

## 2018-07-12 RX ORDER — ALBUTEROL SULFATE 90 UG/1
2 AEROSOL, METERED RESPIRATORY (INHALATION) EVERY 6 HOURS PRN
Status: DISCONTINUED | OUTPATIENT
Start: 2018-07-12 | End: 2018-07-17 | Stop reason: HOSPADM

## 2018-07-12 RX ORDER — CHLORHEXIDINE GLUCONATE 0.12 MG/ML
15 RINSE ORAL ONCE
Status: COMPLETED | OUTPATIENT
Start: 2018-07-12 | End: 2018-07-12

## 2018-07-12 RX ORDER — DEXAMETHASONE SODIUM PHOSPHATE 4 MG/ML
INJECTION, SOLUTION INTRA-ARTICULAR; INTRALESIONAL; INTRAMUSCULAR; INTRAVENOUS; SOFT TISSUE PRN
Status: DISCONTINUED | OUTPATIENT
Start: 2018-07-12 | End: 2018-07-12 | Stop reason: SDUPTHER

## 2018-07-12 RX ADMIN — SODIUM CHLORIDE, POTASSIUM CHLORIDE, SODIUM LACTATE AND CALCIUM CHLORIDE: 600; 310; 30; 20 INJECTION, SOLUTION INTRAVENOUS at 14:55

## 2018-07-12 RX ADMIN — EPHEDRINE SULFATE 10 MG: 50 INJECTION INTRAMUSCULAR; INTRAVENOUS; SUBCUTANEOUS at 13:41

## 2018-07-12 RX ADMIN — FENTANYL CITRATE 50 MCG: 50 INJECTION INTRAMUSCULAR; INTRAVENOUS at 16:19

## 2018-07-12 RX ADMIN — Medication 15 ML: at 06:37

## 2018-07-12 RX ADMIN — FENTANYL CITRATE 100 MCG: 50 INJECTION INTRAMUSCULAR; INTRAVENOUS at 09:03

## 2018-07-12 RX ADMIN — SODIUM CHLORIDE, POTASSIUM CHLORIDE, SODIUM LACTATE AND CALCIUM CHLORIDE: 600; 310; 30; 20 INJECTION, SOLUTION INTRAVENOUS at 12:22

## 2018-07-12 RX ADMIN — SODIUM CHLORIDE 5.3 UNITS/HR: 9 INJECTION, SOLUTION INTRAVENOUS at 16:56

## 2018-07-12 RX ADMIN — PROPOFOL 200 MG: 10 INJECTION, EMULSION INTRAVENOUS at 08:00

## 2018-07-12 RX ADMIN — EPHEDRINE SULFATE 10 MG: 50 INJECTION INTRAMUSCULAR; INTRAVENOUS; SUBCUTANEOUS at 13:58

## 2018-07-12 RX ADMIN — FENTANYL CITRATE 50 MCG: 50 INJECTION INTRAMUSCULAR; INTRAVENOUS at 09:08

## 2018-07-12 RX ADMIN — ROCURONIUM BROMIDE 50 MG: 10 INJECTION INTRAVENOUS at 12:19

## 2018-07-12 RX ADMIN — PHENYLEPHRINE HYDROCHLORIDE 100 MCG: 10 INJECTION INTRAVENOUS at 14:01

## 2018-07-12 RX ADMIN — POTASSIUM CHLORIDE 20 MEQ: 400 INJECTION, SOLUTION INTRAVENOUS at 23:20

## 2018-07-12 RX ADMIN — ROCURONIUM BROMIDE 50 MG: 10 INJECTION INTRAVENOUS at 08:49

## 2018-07-12 RX ADMIN — MORPHINE SULFATE 2 MG: 2 INJECTION, SOLUTION INTRAMUSCULAR; INTRAVENOUS at 16:51

## 2018-07-12 RX ADMIN — FENTANYL CITRATE 50 MCG: 50 INJECTION INTRAMUSCULAR; INTRAVENOUS at 12:48

## 2018-07-12 RX ADMIN — Medication 3 G: at 08:28

## 2018-07-12 RX ADMIN — ROCURONIUM BROMIDE 50 MG: 10 INJECTION INTRAVENOUS at 08:00

## 2018-07-12 RX ADMIN — FAMOTIDINE 20 MG: 10 INJECTION, SOLUTION INTRAVENOUS at 21:33

## 2018-07-12 RX ADMIN — ROCURONIUM BROMIDE 30 MG: 10 INJECTION INTRAVENOUS at 15:45

## 2018-07-12 RX ADMIN — Medication 2 G: at 12:30

## 2018-07-12 RX ADMIN — OXYCODONE HYDROCHLORIDE 5 MG: 5 TABLET ORAL at 21:33

## 2018-07-12 RX ADMIN — Medication 10 ML: at 21:33

## 2018-07-12 RX ADMIN — EPHEDRINE SULFATE 10 MG: 50 INJECTION INTRAMUSCULAR; INTRAVENOUS; SUBCUTANEOUS at 08:50

## 2018-07-12 RX ADMIN — FENTANYL CITRATE 100 MCG: 50 INJECTION INTRAMUSCULAR; INTRAVENOUS at 08:00

## 2018-07-12 RX ADMIN — SODIUM CHLORIDE, POTASSIUM CHLORIDE, SODIUM LACTATE AND CALCIUM CHLORIDE: 600; 310; 30; 20 INJECTION, SOLUTION INTRAVENOUS at 09:29

## 2018-07-12 RX ADMIN — FENTANYL CITRATE 50 MCG: 50 INJECTION INTRAMUSCULAR; INTRAVENOUS at 10:34

## 2018-07-12 RX ADMIN — FENTANYL CITRATE 50 MCG: 50 INJECTION INTRAMUSCULAR; INTRAVENOUS at 12:20

## 2018-07-12 RX ADMIN — EPHEDRINE SULFATE 10 MG: 50 INJECTION INTRAMUSCULAR; INTRAVENOUS; SUBCUTANEOUS at 10:19

## 2018-07-12 RX ADMIN — AMIODARONE HYDROCHLORIDE 200 MG: 200 TABLET ORAL at 21:33

## 2018-07-12 RX ADMIN — MIDAZOLAM HYDROCHLORIDE 2 MG: 1 INJECTION, SOLUTION INTRAMUSCULAR; INTRAVENOUS at 07:51

## 2018-07-12 RX ADMIN — ROCURONIUM BROMIDE 50 MG: 10 INJECTION INTRAVENOUS at 10:51

## 2018-07-12 RX ADMIN — FENTANYL CITRATE 50 MCG: 50 INJECTION INTRAMUSCULAR; INTRAVENOUS at 10:39

## 2018-07-12 RX ADMIN — ROCURONIUM BROMIDE 50 MG: 10 INJECTION INTRAVENOUS at 09:36

## 2018-07-12 RX ADMIN — EPHEDRINE SULFATE 20 MG: 50 INJECTION INTRAMUSCULAR; INTRAVENOUS; SUBCUTANEOUS at 14:19

## 2018-07-12 RX ADMIN — AMIODARONE HYDROCHLORIDE 150 MG: 1.5 INJECTION, SOLUTION INTRAVENOUS at 17:22

## 2018-07-12 RX ADMIN — FENTANYL CITRATE 50 MCG: 50 INJECTION INTRAMUSCULAR; INTRAVENOUS at 10:02

## 2018-07-12 RX ADMIN — Medication 3 G: at 22:05

## 2018-07-12 RX ADMIN — DEXAMETHASONE SODIUM PHOSPHATE 10 MG: 4 INJECTION, SOLUTION INTRAMUSCULAR; INTRAVENOUS at 08:00

## 2018-07-12 RX ADMIN — FENTANYL CITRATE 50 MCG: 50 INJECTION INTRAMUSCULAR; INTRAVENOUS at 15:38

## 2018-07-12 RX ADMIN — LIDOCAINE HYDROCHLORIDE 100 MG: 20 INJECTION, SOLUTION INFILTRATION; PERINEURAL at 08:00

## 2018-07-12 RX ADMIN — MORPHINE SULFATE 2 MG: 2 INJECTION, SOLUTION INTRAMUSCULAR; INTRAVENOUS at 19:33

## 2018-07-12 RX ADMIN — SODIUM CHLORIDE: 9 INJECTION, SOLUTION INTRAVENOUS at 08:00

## 2018-07-12 RX ADMIN — FENTANYL CITRATE 100 MCG: 50 INJECTION INTRAMUSCULAR; INTRAVENOUS at 16:10

## 2018-07-12 RX ADMIN — ROCURONIUM BROMIDE 50 MG: 10 INJECTION INTRAVENOUS at 14:16

## 2018-07-12 RX ADMIN — FENTANYL CITRATE 50 MCG: 50 INJECTION INTRAMUSCULAR; INTRAVENOUS at 13:12

## 2018-07-12 RX ADMIN — EPHEDRINE SULFATE 10 MG: 50 INJECTION INTRAMUSCULAR; INTRAVENOUS; SUBCUTANEOUS at 13:40

## 2018-07-12 ASSESSMENT — PULMONARY FUNCTION TESTS
PIF_VALUE: 27
PIF_VALUE: 25
PIF_VALUE: 29
PIF_VALUE: 26
PIF_VALUE: 28
PIF_VALUE: 29
PIF_VALUE: 28
PIF_VALUE: 28
PIF_VALUE: 24
PIF_VALUE: 1
PIF_VALUE: 28
PIF_VALUE: 28
PIF_VALUE: 33
PIF_VALUE: 32
PIF_VALUE: 25
PIF_VALUE: 25
PIF_VALUE: 26
PIF_VALUE: 27
PIF_VALUE: 23
PIF_VALUE: 31
PIF_VALUE: 31
PIF_VALUE: 21
PIF_VALUE: 26
PIF_VALUE: 28
PIF_VALUE: 38
PIF_VALUE: 20
PIF_VALUE: 25
PIF_VALUE: 28
PIF_VALUE: 20
PIF_VALUE: 27
PIF_VALUE: 27
PIF_VALUE: 28
PIF_VALUE: 32
PIF_VALUE: 31
PIF_VALUE: 1
PIF_VALUE: 31
PIF_VALUE: 28
PIF_VALUE: 28
PIF_VALUE: 26
PIF_VALUE: 32
PIF_VALUE: 32
PIF_VALUE: 31
PIF_VALUE: 32
PIF_VALUE: 23
PIF_VALUE: 26
PIF_VALUE: 25
PIF_VALUE: 27
PIF_VALUE: 27
PIF_VALUE: 29
PIF_VALUE: 26
PIF_VALUE: 26
PIF_VALUE: 21
PIF_VALUE: 31
PIF_VALUE: 28
PIF_VALUE: 27
PIF_VALUE: 25
PIF_VALUE: 31
PIF_VALUE: 25
PIF_VALUE: 25
PIF_VALUE: 27
PIF_VALUE: 27
PIF_VALUE: 25
PIF_VALUE: 35
PIF_VALUE: 27
PIF_VALUE: 34
PIF_VALUE: 30
PIF_VALUE: 9
PIF_VALUE: 27
PIF_VALUE: 28
PIF_VALUE: 27
PIF_VALUE: 28
PIF_VALUE: 27
PIF_VALUE: 22
PIF_VALUE: 19
PIF_VALUE: 31
PIF_VALUE: 27
PIF_VALUE: 30
PIF_VALUE: 29
PIF_VALUE: 21
PIF_VALUE: 26
PIF_VALUE: 25
PIF_VALUE: 25
PIF_VALUE: 31
PIF_VALUE: 26
PIF_VALUE: 26
PIF_VALUE: 32
PIF_VALUE: 30
PIF_VALUE: 30
PIF_VALUE: 31
PIF_VALUE: 31
PIF_VALUE: 34
PIF_VALUE: 30
PIF_VALUE: 23
PIF_VALUE: 29
PIF_VALUE: 26
PIF_VALUE: 29
PIF_VALUE: 28
PIF_VALUE: 26
PIF_VALUE: 25
PIF_VALUE: 21
PIF_VALUE: 26
PIF_VALUE: 28
PIF_VALUE: 26
PIF_VALUE: 29
PIF_VALUE: 21
PIF_VALUE: 25
PIF_VALUE: 27
PIF_VALUE: 28
PIF_VALUE: 25
PIF_VALUE: 26
PIF_VALUE: 31
PIF_VALUE: 29
PIF_VALUE: 27
PIF_VALUE: 2
PIF_VALUE: 31
PIF_VALUE: 26
PIF_VALUE: 21
PIF_VALUE: 27
PIF_VALUE: 26
PIF_VALUE: 26
PIF_VALUE: 27
PIF_VALUE: 30
PIF_VALUE: 31
PIF_VALUE: 26
PIF_VALUE: 25
PIF_VALUE: 37
PIF_VALUE: 28
PIF_VALUE: 32
PIF_VALUE: 26
PIF_VALUE: 28
PIF_VALUE: 27
PIF_VALUE: 33
PIF_VALUE: 31
PIF_VALUE: 28
PIF_VALUE: 32
PIF_VALUE: 29
PIF_VALUE: 25
PIF_VALUE: 36
PIF_VALUE: 33
PIF_VALUE: 20
PIF_VALUE: 26
PIF_VALUE: 33
PIF_VALUE: 26
PIF_VALUE: 21
PIF_VALUE: 25
PIF_VALUE: 25
PIF_VALUE: 24
PIF_VALUE: 29
PIF_VALUE: 26
PIF_VALUE: 25
PIF_VALUE: 26
PIF_VALUE: 31
PIF_VALUE: 30
PIF_VALUE: 30
PIF_VALUE: 31
PIF_VALUE: 27
PIF_VALUE: 27
PIF_VALUE: 31
PIF_VALUE: 35
PIF_VALUE: 20
PIF_VALUE: 29
PIF_VALUE: 28
PIF_VALUE: 29
PIF_VALUE: 26
PIF_VALUE: 29
PIF_VALUE: 21
PIF_VALUE: 28
PIF_VALUE: 30
PIF_VALUE: 30
PIF_VALUE: 28
PIF_VALUE: 29
PIF_VALUE: 26
PIF_VALUE: 26
PIF_VALUE: 29
PIF_VALUE: 25
PIF_VALUE: 27
PIF_VALUE: 23
PIF_VALUE: 26
PIF_VALUE: 21
PIF_VALUE: 30
PIF_VALUE: 31
PIF_VALUE: 26
PIF_VALUE: 26
PIF_VALUE: 25
PIF_VALUE: 27
PIF_VALUE: 20
PIF_VALUE: 26
PIF_VALUE: 30
PIF_VALUE: 31
PIF_VALUE: 22
PIF_VALUE: 23
PIF_VALUE: 27
PIF_VALUE: 37
PIF_VALUE: 30
PIF_VALUE: 30
PIF_VALUE: 25
PIF_VALUE: 34
PIF_VALUE: 27
PIF_VALUE: 28
PIF_VALUE: 27
PIF_VALUE: 26
PIF_VALUE: 27
PIF_VALUE: 27
PIF_VALUE: 32
PIF_VALUE: 27
PIF_VALUE: 26
PIF_VALUE: 31
PIF_VALUE: 35
PIF_VALUE: 28
PIF_VALUE: 34
PIF_VALUE: 26
PIF_VALUE: 28
PIF_VALUE: 26
PIF_VALUE: 21
PIF_VALUE: 26
PIF_VALUE: 32
PIF_VALUE: 32
PIF_VALUE: 28
PIF_VALUE: 37
PIF_VALUE: 31
PIF_VALUE: 27
PIF_VALUE: 15
PIF_VALUE: 28
PIF_VALUE: 1
PIF_VALUE: 30
PIF_VALUE: 28
PIF_VALUE: 26
PIF_VALUE: 25
PIF_VALUE: 28
PIF_VALUE: 31
PIF_VALUE: 28
PIF_VALUE: 26
PIF_VALUE: 27
PIF_VALUE: 32
PIF_VALUE: 28
PIF_VALUE: 34
PIF_VALUE: 31
PIF_VALUE: 27
PIF_VALUE: 26
PIF_VALUE: 26
PIF_VALUE: 27
PIF_VALUE: 27
PIF_VALUE: 30
PIF_VALUE: 31
PIF_VALUE: 27
PIF_VALUE: 21
PIF_VALUE: 27
PIF_VALUE: 26
PIF_VALUE: 31
PIF_VALUE: 26
PIF_VALUE: 20
PIF_VALUE: 32
PIF_VALUE: 28
PIF_VALUE: 32
PIF_VALUE: 23
PIF_VALUE: 26
PIF_VALUE: 26
PIF_VALUE: 28
PIF_VALUE: 29
PIF_VALUE: 27
PIF_VALUE: 30
PIF_VALUE: 22
PIF_VALUE: 28
PIF_VALUE: 27
PIF_VALUE: 23
PIF_VALUE: 31
PIF_VALUE: 1
PIF_VALUE: 25
PIF_VALUE: 29
PIF_VALUE: 20
PIF_VALUE: 30
PIF_VALUE: 1
PIF_VALUE: 26
PIF_VALUE: 20
PIF_VALUE: 34
PIF_VALUE: 28
PIF_VALUE: 27
PIF_VALUE: 27
PIF_VALUE: 28
PIF_VALUE: 31
PIF_VALUE: 34
PIF_VALUE: 33
PIF_VALUE: 27
PIF_VALUE: 25
PIF_VALUE: 32
PIF_VALUE: 28
PIF_VALUE: 25
PIF_VALUE: 25
PIF_VALUE: 23
PIF_VALUE: 29
PIF_VALUE: 1
PIF_VALUE: 36
PIF_VALUE: 27
PIF_VALUE: 29
PIF_VALUE: 28
PIF_VALUE: 32
PIF_VALUE: 31
PIF_VALUE: 28
PIF_VALUE: 27
PIF_VALUE: 27
PIF_VALUE: 11
PIF_VALUE: 35
PIF_VALUE: 31
PIF_VALUE: 31
PIF_VALUE: 28
PIF_VALUE: 20
PIF_VALUE: 31
PIF_VALUE: 32
PIF_VALUE: 33
PIF_VALUE: 30
PIF_VALUE: 31
PIF_VALUE: 23
PIF_VALUE: 32
PIF_VALUE: 27
PIF_VALUE: 26
PIF_VALUE: 35
PIF_VALUE: 26
PIF_VALUE: 21
PIF_VALUE: 31
PIF_VALUE: 31
PIF_VALUE: 26
PIF_VALUE: 26
PIF_VALUE: 21
PIF_VALUE: 31
PIF_VALUE: 33
PIF_VALUE: 31
PIF_VALUE: 20
PIF_VALUE: 30
PIF_VALUE: 22
PIF_VALUE: 29
PIF_VALUE: 23
PIF_VALUE: 25
PIF_VALUE: 23
PIF_VALUE: 22
PIF_VALUE: 23
PIF_VALUE: 31
PIF_VALUE: 29
PIF_VALUE: 26
PIF_VALUE: 31
PIF_VALUE: 36
PIF_VALUE: 26
PIF_VALUE: 37
PIF_VALUE: 35
PIF_VALUE: 27
PIF_VALUE: 34
PIF_VALUE: 31
PIF_VALUE: 21
PIF_VALUE: 27
PIF_VALUE: 29
PIF_VALUE: 31
PIF_VALUE: 27
PIF_VALUE: 34
PIF_VALUE: 27
PIF_VALUE: 35
PIF_VALUE: 8
PIF_VALUE: 29
PIF_VALUE: 25
PIF_VALUE: 20
PIF_VALUE: 29
PIF_VALUE: 25
PIF_VALUE: 23
PIF_VALUE: 29
PIF_VALUE: 26
PIF_VALUE: 21
PIF_VALUE: 29
PIF_VALUE: 30
PIF_VALUE: 28
PIF_VALUE: 31
PIF_VALUE: 35
PIF_VALUE: 27
PIF_VALUE: 27
PIF_VALUE: 34
PIF_VALUE: 26
PIF_VALUE: 31
PIF_VALUE: 26
PIF_VALUE: 32
PIF_VALUE: 30
PIF_VALUE: 35
PIF_VALUE: 27
PIF_VALUE: 1
PIF_VALUE: 26
PIF_VALUE: 32
PIF_VALUE: 27
PIF_VALUE: 31
PIF_VALUE: 32
PIF_VALUE: 31
PIF_VALUE: 22
PIF_VALUE: 26
PIF_VALUE: 27
PIF_VALUE: 31
PIF_VALUE: 26
PIF_VALUE: 21
PIF_VALUE: 27
PIF_VALUE: 22
PIF_VALUE: 27
PIF_VALUE: 31
PIF_VALUE: 34
PIF_VALUE: 29
PIF_VALUE: 29
PIF_VALUE: 28
PIF_VALUE: 28
PIF_VALUE: 34
PIF_VALUE: 20
PIF_VALUE: 34
PIF_VALUE: 27
PIF_VALUE: 28
PIF_VALUE: 20
PIF_VALUE: 21
PIF_VALUE: 20
PIF_VALUE: 27
PIF_VALUE: 27
PIF_VALUE: 28
PIF_VALUE: 26
PIF_VALUE: 31
PIF_VALUE: 28
PIF_VALUE: 32
PIF_VALUE: 31
PIF_VALUE: 28
PIF_VALUE: 35
PIF_VALUE: 1
PIF_VALUE: 30
PIF_VALUE: 28
PIF_VALUE: 1
PIF_VALUE: 31
PIF_VALUE: 26
PIF_VALUE: 28
PIF_VALUE: 34
PIF_VALUE: 30
PIF_VALUE: 31
PIF_VALUE: 25
PIF_VALUE: 31
PIF_VALUE: 26
PIF_VALUE: 28
PIF_VALUE: 27
PIF_VALUE: 26
PIF_VALUE: 35
PIF_VALUE: 1
PIF_VALUE: 31
PIF_VALUE: 24
PIF_VALUE: 25
PIF_VALUE: 30
PIF_VALUE: 25
PIF_VALUE: 33
PIF_VALUE: 27
PIF_VALUE: 34
PIF_VALUE: 27
PIF_VALUE: 30
PIF_VALUE: 26
PIF_VALUE: 24
PIF_VALUE: 38
PIF_VALUE: 31
PIF_VALUE: 28
PIF_VALUE: 28
PIF_VALUE: 26
PIF_VALUE: 31
PIF_VALUE: 28
PIF_VALUE: 30
PIF_VALUE: 26
PIF_VALUE: 30
PIF_VALUE: 30
PIF_VALUE: 27
PIF_VALUE: 29
PIF_VALUE: 28
PIF_VALUE: 27
PIF_VALUE: 30
PIF_VALUE: 27
PIF_VALUE: 30
PIF_VALUE: 1
PIF_VALUE: 26
PIF_VALUE: 28
PIF_VALUE: 26
PIF_VALUE: 33
PIF_VALUE: 26
PIF_VALUE: 32
PIF_VALUE: 31
PIF_VALUE: 1
PIF_VALUE: 34
PIF_VALUE: 29
PIF_VALUE: 26
PIF_VALUE: 28
PIF_VALUE: 26
PIF_VALUE: 30
PIF_VALUE: 25
PIF_VALUE: 27
PIF_VALUE: 30
PIF_VALUE: 26
PIF_VALUE: 34
PIF_VALUE: 30
PIF_VALUE: 25
PIF_VALUE: 26
PIF_VALUE: 26
PIF_VALUE: 31
PIF_VALUE: 31
PIF_VALUE: 23
PIF_VALUE: 27
PIF_VALUE: 27
PIF_VALUE: 26
PIF_VALUE: 25
PIF_VALUE: 32
PIF_VALUE: 28
PIF_VALUE: 20
PIF_VALUE: 35
PIF_VALUE: 28
PIF_VALUE: 26
PIF_VALUE: 28
PIF_VALUE: 31
PIF_VALUE: 20
PIF_VALUE: 29
PIF_VALUE: 35

## 2018-07-12 ASSESSMENT — PAIN SCALES - GENERAL
PAINLEVEL_OUTOF10: 7
PAINLEVEL_OUTOF10: 4
PAINLEVEL_OUTOF10: 6
PAINLEVEL_OUTOF10: 6
PAINLEVEL_OUTOF10: 5

## 2018-07-12 NOTE — OP NOTE
DATE: 07/12/18    PATIENT: Karl Shannon    MRN: [id number]     Acct: [de-identified]    PREOP DIAGNOSIS:    Longstanding persistent atrial fibrillation    Postop diagnosis:  Longstanding persistent atrial fibrillation    Procedure:  1) 5-box thoracoscopic maze procedure  2) Ligation of the left atrial appendage    SURGEON:  Fabi Harris MD    ASSISTANT: JOSIAH Melton    INDICATION:  The patient is a 70y.o. year-old male who presents with a 20-year history of longstanding persistent atrial fibrillation refractory to anti-arrhythmics, and whose anticoagulation has been complicated by GI bleeding. FINDNGS: There were no intrapericardial adhesions. A standard 5-box lesion protocol was performed, including the mitral annular ablation at the anterior trigone. Real-time verification was done with the demonstration of bidirectional block; exit block was tested at 20 mA throughout the procedure. DESCRIPTION:  The patient was prepped and draped in sterile fashion in the supine position. A formal time-out was performed. IGGY confirmed the absence of thrombus in the left heart; the probe was then removed completely from the patient. The right lung was isolated. The usual array of four thoracoscopic ports was placed in the right chest. CO2 insufflation was performed at 8 mm Hg pressure. A linear pericardiotomy was made 3 cm anterior to the right phrenic nerve. Retraction sutures were placed. The SVC was mobilized to the takeoff of the azygous vein. Using the Lumitip dissector, a flexible catheter was passed around the SVC as it crossed the right pulmonary artery, and was brought through the chest through a separate anterior stab incision placed immediately lateral to the mammary bundle. A preliminary dissection of the right end of the transverse sinus was done. The oblique sinus was opened completely. The right pulmonary antrum was circumnavigated with a flexible catheter.   The right pulmonary vein an enclosed triangle set of lesions created with a dual energy source technique on the LA dome, with the points consisting of the two superior pulmonary veins and the anterior mitral trigone at the left-noncoronary commissure of the aortic root. Transmurality of the constituent ablation lines was confirmed with the demonstration of bidirectional block. The SVC was ablated with the RF clamp 3 cm above the SA node; bidirectional block was confirmed. An epicardial pacing wire was attached to the right atrial appendage using an Endoloop. The right chest was drained with a Dhruv catheter. The right lung was reinflated under direct vision. The left lung was isolated. The usual array of four thoracoscopic ports was placed in the left chest. CO2 insufflation was performed at 8 mm Hg pressure. A linear pericardiotomy was made 2 cm posterior to the right phrenic nerve. Retraction sutures were placed. The left pulmonary antrum was circumnavigated with a flexible catheter. The left pulmonary vein isolation was performed with the usual 5 applications of the quadripolar radiofrequency clamp. Bidirectional block was confirmed. The left end of the transverse sinus, particularly the left atrium extending onto the junction with the left superior pulmonary vein, was ablated with the linear RF probe and a 5 minute cryoablation. The apex was elevated with a fan retractor. A line of ablations connecting the two inferior pulmonary veins across the floor of the left atrium was constructed in the usual fashion, incorporating 2 1/2 rounds of the dual energy sources. This resulted in the creation of a four-sided box isolating the entire posterior left atrium, which was confirmed with the demonstration of bidirectional block. Note that, after each RF ablation, the posterior left atrium was kept elevated away from contact from the posterior pericardium for 60 seconds, to allow cooling of the ablated tissue.  After the final RF ablation, the posterior left atrium was copiously irrigated with saline also for cooling purposes. The CARLTON was then ligated with a 40 mm Atriclip. The left chest was drained with a Dhruv catheter. The left lung was reinflated under direct vision. The incisions were closed in the usual two layers. The patient transferred to the ICU in stable condition.

## 2018-07-12 NOTE — ANESTHESIA PROCEDURE NOTES
Arterial Line:    An arterial line was placed using surface landmarks, in the OR for the following indication(s): continuous blood pressure monitoring and blood sampling needed. A 20 gauge (size), 1 and 1/4 inch (length), Arrow (type) catheter was placed, Seldinger technique used, into the left radial artery, secured by tape, Tegaderm and suture. Anesthesia type: Local  Local infiltration: Injection    Events:  patient tolerated procedure well with no complications.   7/12/2018 7:50 AM7/12/2018 7:55 AM  Anesthesiologist: Sabrina Rosendo  Performed: Anesthesiologist   Preanesthetic Checklist  Completed: patient identified, site marked, surgical consent, pre-op evaluation, timeout performed, IV checked, risks and benefits discussed, monitors and equipment checked, anesthesia consent given, oxygen available and patient being monitored

## 2018-07-12 NOTE — H&P
sudden cardiac death.        Past Surgical History   Past Surgical History         Past Surgical History:   Procedure Laterality Date    BACK SURGERY   05/2017    CARDIAC CATHETERIZATION   1995??     no stents    CHOLECYSTECTOMY   1984??    COLONOSCOPY         last one 2015??    CYSTOSCOPY   05/02/2017    KIDNEY STONE SURGERY Left 05/02/2017     basket retrievel    KNEE SURGERY   1988? ??     arthroscopy, left    LITHOTRIPSY   1990?? -  2016??     kidney stones    OTHER SURGICAL HISTORY   5/26/2016     LEFT ESWL    ROTATOR CUFF REPAIR   2000? ?     right    SKIN BIOPSY        URETEROSCOPY Left 05/02/2017            Subjective:      Review of Systems   Constitutional: Negative for activity change and fatigue. HENT: Negative for congestion. Eyes: Negative for discharge. Respiratory: Negative for apnea and shortness of breath. Cardiovascular: Negative for chest pain and palpitations. Gastrointestinal: Negative for abdominal pain. Endocrine: Negative for cold intolerance. Genitourinary: Negative for difficulty urinating. Musculoskeletal: Positive for arthralgias. Allergic/Immunologic: Negative for environmental allergies. Neurological: Negative for dizziness. Hematological: Bruises/bleeds easily. Psychiatric/Behavioral: Negative for agitation.         Objective:      /66 (Site: Right Arm, Position: Sitting, Cuff Size: Large Adult)   Pulse 88   Ht 6' (1.829 m)   Wt 296 lb (134.3 kg)   BMI 40.14 kg/m²          Wt Readings from Last 3 Encounters:   05/09/18 296 lb (134.3 kg)   05/02/18 (!) 302 lb 9.6 oz (137.3 kg)   02/24/18 (!) 307 lb 4.8 oz (139.4 kg)          BP Readings from Last 3 Encounters:   05/09/18 132/66   05/02/18 (!) 144/74   02/24/18 (!) 159/78         Physical Exam   Constitutional: He appears well-developed and well-nourished. HENT:   Head: Normocephalic and atraumatic. Eyes: Conjunctivae and EOM are normal. Pupils are equal, round, and reactive to light. [id number]  Study date: 13-Nov-2013  Gender: Male  Ht-Wt-BSA: 72 in- 299.4 lb- 2.53 mÂ²     Reading Physician:  Lance Barraza DO  Technologist:  Keenan Waller F[place]ian, [address]  Referring Physician:  Lance Barraza DO/Referring Physician:  Brandyn Ventura MD     Reason for study: AFIB  /HISTORY: PRIOR HISTORY: AFIB, HTN, Diabetic, HLD, Arthritis, Dizziness  The transthoracic approach was used. The study included complete 2D imaging, M-mode, complete spectral Doppler, and  color Doppler. Systolic blood pressure was 132 mmHg, at the start of the study. Diastolic blood pressure was 76 mmHg, at the start of the study. Image quality  was adequate.     LEFT VENTRICLE: Size was normal. Systolic function was normal. Ejection  fraction was estimated in the range of 50 % to 55 %. There were no regional  wall motion abnormalities. Wall thickness was mildly increased. Concentric  hypertrophy was present. DOPPLER: Left ventricular diastolic function  parameters were normal.     AORTIC VALVE: The valve was trileaflet. Leaflets exhibited normal cuspal  separation. DOPPLER: Transaortic velocity was within the normal range. There  was no evidence for stenosis. There was no regurgitation.     AORTA: The root exhibited normal size.     MITRAL VALVE: Valve structure was normal. There was mild thickening of the  anterior and posterior leaflets. There was normal leaflet separation. DOPPLER:  The transmitral velocity was within the normal range. There was no evidence for  stenosis. There was trivial regurgitation.     LEFT ATRIUM: Size was normal.     RIGHT VENTRICLE: The ventricle was mildly dilated. Systolic function was  normal. Wall thickness was normal.     PULMONIC VALVE: Leaflets exhibited normal thickness, no calcification, and  normal cuspal separation. DOPPLER: The transpulmonic velocity was within the  normal range.  There was trivial regurgitation.     PULMONARY ARTERY: The size was normal. DOPPLER: Systolic pressure was within  the normal

## 2018-07-12 NOTE — ANESTHESIA PRE PROCEDURE
Department of Anesthesiology  Preprocedure Note       Name:  Brent Corea   Age:  70 y.o.  :  1950                                          MRN:  [id number]         Date:  2018      Surgeon: Sandra Billy):  Monica Up MD    Procedure: Procedure(s):  5--BOX THORASCOPIC MAZE WITH IGGY    Medications prior to admission:   Prior to Admission medications    Medication Sig Start Date End Date Taking? Authorizing Provider   amiodarone (CORDARONE) 200 MG tablet Take 1 tablet by mouth 2 times daily 18  Yes RAMIREZ Bernal CNP   metoprolol tartrate (LOPRESSOR) 50 MG tablet take 1 tablet by mouth twice a day 6/10/18  Yes Robyn Clark MD   furosemide (LASIX) 40 MG tablet Take 0.5 tablets by mouth daily 18  Yes Robyn Clark MD   NONFORMULARY Take 1 tablet by mouth 2 times daily Milk Thistle   Yes Historical Provider, MD   NONFORMULARY Take 1 capsule by mouth 2 times daily Equate Digestive care probiotic   Yes Historical Provider, MD   docusate sodium (COLACE) 250 MG capsule Take 250 mg by mouth nightly    Yes Historical Provider, MD   tamsulosin (FLOMAX) 0.4 MG capsule take 1 capsule by mouth once daily  Patient taking differently: take 1 capsule by mouth once nightly 18  Yes RAMIREZ Brannon CNP   insulin NPH (HUMULIN N;NOVOLIN N) 100 UNIT/ML injection vial Inject 40 Units into the skin 2 times daily Lunch and bedtime   Yes Historical Provider, MD   insulin aspart (NOVOLOG) 100 UNIT/ML injection vial Inject into the skin 4 times daily Glucose  6 units,  101-120 9 units,  121 - 150 12 units,  151-200 15 units,  201 - 250 18 units,  251 -300 24 units,  301-350 30 units, 351-400 36 units.    Yes Historical Provider, MD   gabapentin (NEURONTIN) 600 MG tablet Take 600 mg by mouth 3 times daily Indications: Pain    Yes Historical Provider, MD   fluticasone (FLONASE) 50 MCG/ACT nasal spray 1 spray by Nasal route daily as needed for Rhinitis or Allergies    Yes Historical kg)     Body mass index is 40.74 kg/m². CBC:   Lab Results   Component Value Date    WBC 9.5 06/25/2018    RBC 5.09 06/25/2018    HGB 14.7 06/25/2018    HCT 43.3 06/25/2018    MCV 85.1 06/25/2018    RDW 14.7 06/25/2018     06/25/2018       CMP:   Lab Results   Component Value Date     06/25/2018    K 3.6 06/25/2018    CL 99 06/25/2018    CO2 31 06/25/2018    BUN 16 06/25/2018    CREATININE 0.9 06/25/2018    LABGLOM 84 06/25/2018    GLUCOSE 97 06/25/2018    PROT 7.7 05/24/2018    CALCIUM 9.6 06/25/2018    BILITOT 0.7 05/24/2018    ALKPHOS 90 05/24/2018    AST 21 05/24/2018    ALT 13 05/24/2018       POC Tests:   Recent Labs      07/12/18   0649   POCGLU  152*       Coags:   Lab Results   Component Value Date    INR 1.37 07/12/2018    APTT 39.0 06/25/2018       HCG (If Applicable): No results found for: PREGTESTUR, PREGSERUM, HCG, HCGQUANT     ABGs: No results found for: PHART, PO2ART, VOV7FDX, ASG9UEV, BEART, D5CGRLFS     Type & Screen (If Applicable):  Lab Results   Component Value Date    LABRH POS 06/25/2018       Anesthesia Evaluation    Airway: Mallampati: II  TM distance: >3 FB   Neck ROM: full  Mouth opening: > = 3 FB Dental:          Pulmonary: breath sounds clear to auscultation                             Cardiovascular:    (+) hypertension:, dysrhythmias: atrial fibrillation,         Rhythm: regular                      Neuro/Psych:               GI/Hepatic/Renal:             Endo/Other:    (+) Diabetes, . Abdominal:   (+) obese,         Vascular:                                        Anesthesia Plan      general     ASA 3     (Pt. Denies upper gi symptoms  INTRA OP IGGY RISKS BENEFITS AND ALTERNATES DISCUSSED, PT. CONSENTED  INTRA OP IGGY WAS REQUESTED BY DR Orren Boeck  A LINE , CENTRAL LINE (SLICK CATH) DISCUSSED , PT. CONSENTED  PLAN GA, LEFT SIDED AMY, INVASIVE MONITORING INTRA OP IGGY , POST OP ICU CARE AND VENT. SUPPORT)  Induction: intravenous.   arterial line, BIS, central line, CVP and IGGY  MIPS: Postoperative opioids intended and Prophylactic antiemetics administered. Anesthetic plan and risks discussed with patient and spouse. Use of blood products discussed with patient and spouse whom consented to blood products. Plan discussed with CRNA.                   Laurita Vazquez MD   7/12/2018

## 2018-07-12 NOTE — ANESTHESIA PROCEDURE NOTES
Central Venous Line:    A central venous line was placed using ultrasound guidance, in the OR for the following indication(s): central venous access and CVP monitoring. 7/12/2018 8:10 AM7/12/2018 8:22 AM    Sterility preparation included the following: hand hygiene performed prior to procedure, maximum sterile barriers used and sterile technique used to drape from head to toe. The patient was placed in Trendelenburg position. The right internal jugular vein was prepped. The site was prepped with Chloraprep. A 9 Fr (size), 10 (length), introducer single lumen was placed. During the procedure, the following specific steps were taken: target vein identified, needle advanced into vein and blood aspirated and guidewire advanced into vein. Intravenous verification was obtained by ultrasound and venous blood return. Post insertion care included: all ports aspirated, all ports flushed easily, guidewire removed intact, Biopatch applied, line sutured in place and dressing applied. During the procedure the patient experienced: patient tolerated procedure well with no complications. Insertion site scrubbed per usage guidelines?: Yes  Skin prep agent dried for 3 minutes prior to procedure?:yes  Anesthesia type: general.. No    Additional notes:  Placed sleek catheter  through introducer port  Staffing  Anesthesiologist: Rebekah Ochoa  Performed: Anesthesiologist   Preanesthetic Checklist  Completed: patient identified, site marked, surgical consent, pre-op evaluation, timeout performed, IV checked, risks and benefits discussed, monitors and equipment checked, anesthesia consent given, oxygen available and patient being monitored

## 2018-07-12 NOTE — FLOWSHEET NOTE
1750 Pt awake and able to follow commands x 4. Pt able to keep eyes open and interact with family. 1820 Pt placed on CPAP per Resp Therapist.   1850 EKG completed while pacer paused. Then Pacer continued at AAI rate 70 AmA of 10. Pt continues to tolerate CPAP well.

## 2018-07-13 ENCOUNTER — APPOINTMENT (OUTPATIENT)
Dept: GENERAL RADIOLOGY | Age: 68
DRG: 228 | End: 2018-07-13
Attending: THORACIC SURGERY (CARDIOTHORACIC VASCULAR SURGERY)
Payer: MEDICARE

## 2018-07-13 LAB
ANION GAP SERPL CALCULATED.3IONS-SCNC: 13 MEQ/L (ref 8–16)
BACTERIA: ABNORMAL
BILIRUBIN URINE: NEGATIVE
BLOOD, URINE: ABNORMAL
BUN BLDV-MCNC: 21 MG/DL (ref 7–22)
CALCIUM SERPL-MCNC: 9 MG/DL (ref 8.5–10.5)
CASTS: ABNORMAL /LPF
CASTS: ABNORMAL /LPF
CHARACTER, URINE: CLEAR
CHLORIDE BLD-SCNC: 101 MEQ/L (ref 98–111)
CO2: 24 MEQ/L (ref 23–33)
COLOR: YELLOW
CREAT SERPL-MCNC: 1.1 MG/DL (ref 0.4–1.2)
CRYSTALS: ABNORMAL
EKG ATRIAL RATE: 54 BPM
EKG P AXIS: 68 DEGREES
EKG P-R INTERVAL: 624 MS
EKG Q-T INTERVAL: 458 MS
EKG QRS DURATION: 96 MS
EKG QTC CALCULATION (BAZETT): 434 MS
EKG R AXIS: 19 DEGREES
EKG T AXIS: 25 DEGREES
EKG VENTRICULAR RATE: 54 BPM
EPITHELIAL CELLS, UA: ABNORMAL /HPF
ERYTHROCYTE [DISTWIDTH] IN BLOOD BY AUTOMATED COUNT: 14.3 % (ref 11.5–14.5)
ERYTHROCYTE [DISTWIDTH] IN BLOOD BY AUTOMATED COUNT: 45.4 FL (ref 35–45)
GFR SERPL CREATININE-BSD FRML MDRD: 66 ML/MIN/1.73M2
GLUCOSE BLD-MCNC: 100 MG/DL (ref 70–108)
GLUCOSE BLD-MCNC: 110 MG/DL (ref 70–108)
GLUCOSE BLD-MCNC: 111 MG/DL (ref 70–108)
GLUCOSE BLD-MCNC: 118 MG/DL (ref 70–108)
GLUCOSE BLD-MCNC: 120 MG/DL (ref 70–108)
GLUCOSE BLD-MCNC: 127 MG/DL (ref 70–108)
GLUCOSE BLD-MCNC: 129 MG/DL (ref 70–108)
GLUCOSE BLD-MCNC: 131 MG/DL (ref 70–108)
GLUCOSE BLD-MCNC: 135 MG/DL (ref 70–108)
GLUCOSE BLD-MCNC: 136 MG/DL (ref 70–108)
GLUCOSE BLD-MCNC: 138 MG/DL (ref 70–108)
GLUCOSE BLD-MCNC: 139 MG/DL (ref 70–108)
GLUCOSE BLD-MCNC: 142 MG/DL (ref 70–108)
GLUCOSE BLD-MCNC: 166 MG/DL (ref 70–108)
GLUCOSE BLD-MCNC: 195 MG/DL (ref 70–108)
GLUCOSE, URINE: NEGATIVE MG/DL
HCT VFR BLD CALC: 39 % (ref 42–52)
HEMOGLOBIN: 13.2 GM/DL (ref 14–18)
KETONES, URINE: NEGATIVE
LEUKOCYTE EST, POC: ABNORMAL
MAGNESIUM: 1.9 MG/DL (ref 1.6–2.4)
MCH RBC QN AUTO: 29.5 PG (ref 26–33)
MCHC RBC AUTO-ENTMCNC: 33.8 GM/DL (ref 32.2–35.5)
MCV RBC AUTO: 87.1 FL (ref 80–94)
MISCELLANEOUS LAB TEST RESULT: ABNORMAL
NITRITE, URINE: NEGATIVE
PH UA: 5
PLATELET # BLD: 230 THOU/MM3 (ref 130–400)
PMV BLD AUTO: 9.7 FL (ref 9.4–12.4)
POTASSIUM SERPL-SCNC: 4.3 MEQ/L (ref 3.5–5.2)
PROTEIN UA: NEGATIVE MG/DL
RBC # BLD: 4.48 MILL/MM3 (ref 4.7–6.1)
RBC URINE: ABNORMAL /HPF
RENAL EPITHELIAL, UA: ABNORMAL
SODIUM BLD-SCNC: 138 MEQ/L (ref 135–145)
SPECIFIC GRAVITY UA: 1.02 (ref 1–1.03)
UROBILINOGEN, URINE: 0.2 EU/DL
WBC # BLD: 27 THOU/MM3 (ref 4.8–10.8)
WBC UA: ABNORMAL /HPF
YEAST: ABNORMAL

## 2018-07-13 PROCEDURE — 36591 DRAW BLOOD OFF VENOUS DEVICE: CPT

## 2018-07-13 PROCEDURE — 2580000003 HC RX 258: Performed by: THORACIC SURGERY (CARDIOTHORACIC VASCULAR SURGERY)

## 2018-07-13 PROCEDURE — 83735 ASSAY OF MAGNESIUM: CPT

## 2018-07-13 PROCEDURE — 2060000000 HC ICU INTERMEDIATE R&B

## 2018-07-13 PROCEDURE — 82948 REAGENT STRIP/BLOOD GLUCOSE: CPT

## 2018-07-13 PROCEDURE — 6360000002 HC RX W HCPCS: Performed by: THORACIC SURGERY (CARDIOTHORACIC VASCULAR SURGERY)

## 2018-07-13 PROCEDURE — 80048 BASIC METABOLIC PNL TOTAL CA: CPT

## 2018-07-13 PROCEDURE — 97110 THERAPEUTIC EXERCISES: CPT

## 2018-07-13 PROCEDURE — 93005 ELECTROCARDIOGRAM TRACING: CPT | Performed by: THORACIC SURGERY (CARDIOTHORACIC VASCULAR SURGERY)

## 2018-07-13 PROCEDURE — 71045 X-RAY EXAM CHEST 1 VIEW: CPT

## 2018-07-13 PROCEDURE — S0028 INJECTION, FAMOTIDINE, 20 MG: HCPCS | Performed by: THORACIC SURGERY (CARDIOTHORACIC VASCULAR SURGERY)

## 2018-07-13 PROCEDURE — 97166 OT EVAL MOD COMPLEX 45 MIN: CPT

## 2018-07-13 PROCEDURE — 2500000003 HC RX 250 WO HCPCS: Performed by: THORACIC SURGERY (CARDIOTHORACIC VASCULAR SURGERY)

## 2018-07-13 PROCEDURE — 6370000000 HC RX 637 (ALT 250 FOR IP): Performed by: THORACIC SURGERY (CARDIOTHORACIC VASCULAR SURGERY)

## 2018-07-13 PROCEDURE — 93010 ELECTROCARDIOGRAM REPORT: CPT | Performed by: INTERNAL MEDICINE

## 2018-07-13 PROCEDURE — G8988 SELF CARE GOAL STATUS: HCPCS

## 2018-07-13 PROCEDURE — 85027 COMPLETE CBC AUTOMATED: CPT

## 2018-07-13 PROCEDURE — G8987 SELF CARE CURRENT STATUS: HCPCS

## 2018-07-13 PROCEDURE — 81001 URINALYSIS AUTO W/SCOPE: CPT

## 2018-07-13 RX ORDER — LORAZEPAM 0.5 MG/1
0.5 TABLET ORAL NIGHTLY PRN
Status: DISCONTINUED | OUTPATIENT
Start: 2018-07-13 | End: 2018-07-17 | Stop reason: HOSPADM

## 2018-07-13 RX ORDER — INSULIN GLARGINE 100 [IU]/ML
30 INJECTION, SOLUTION SUBCUTANEOUS 2 TIMES DAILY
Status: DISCONTINUED | OUTPATIENT
Start: 2018-07-13 | End: 2018-07-14

## 2018-07-13 RX ORDER — POTASSIUM CHLORIDE 20 MEQ/1
10 TABLET, EXTENDED RELEASE ORAL 2 TIMES DAILY WITH MEALS
Status: DISCONTINUED | OUTPATIENT
Start: 2018-07-13 | End: 2018-07-17 | Stop reason: HOSPADM

## 2018-07-13 RX ORDER — FUROSEMIDE 10 MG/ML
20 INJECTION INTRAMUSCULAR; INTRAVENOUS 2 TIMES DAILY
Status: DISCONTINUED | OUTPATIENT
Start: 2018-07-13 | End: 2018-07-17 | Stop reason: HOSPADM

## 2018-07-13 RX ADMIN — LORAZEPAM 0.5 MG: 0.5 TABLET ORAL at 22:27

## 2018-07-13 RX ADMIN — FUROSEMIDE 20 MG: 10 INJECTION, SOLUTION INTRAMUSCULAR; INTRAVENOUS at 17:05

## 2018-07-13 RX ADMIN — OXYCODONE HYDROCHLORIDE 5 MG: 5 TABLET ORAL at 22:50

## 2018-07-13 RX ADMIN — ACETAMINOPHEN 650 MG: 325 TABLET ORAL at 07:50

## 2018-07-13 RX ADMIN — MULTIPLE VITAMINS W/ MINERALS TAB 1 TABLET: TAB at 08:15

## 2018-07-13 RX ADMIN — Medication 10 ML: at 07:45

## 2018-07-13 RX ADMIN — AMIODARONE HYDROCHLORIDE 200 MG: 200 TABLET ORAL at 21:05

## 2018-07-13 RX ADMIN — DOCUSATE SODIUM 100 MG: 100 CAPSULE, LIQUID FILLED ORAL at 21:05

## 2018-07-13 RX ADMIN — FAMOTIDINE 20 MG: 10 INJECTION, SOLUTION INTRAVENOUS at 09:52

## 2018-07-13 RX ADMIN — POTASSIUM CHLORIDE 10 MEQ: 20 TABLET, EXTENDED RELEASE ORAL at 08:15

## 2018-07-13 RX ADMIN — AMIODARONE HYDROCHLORIDE 200 MG: 200 TABLET ORAL at 08:15

## 2018-07-13 RX ADMIN — ACETAMINOPHEN 650 MG: 325 TABLET ORAL at 21:56

## 2018-07-13 RX ADMIN — Medication 3 G: at 13:24

## 2018-07-13 RX ADMIN — ONDANSETRON 4 MG: 2 INJECTION INTRAMUSCULAR; INTRAVENOUS at 15:34

## 2018-07-13 RX ADMIN — SODIUM CHLORIDE 8.2 UNITS/HR: 9 INJECTION, SOLUTION INTRAVENOUS at 15:02

## 2018-07-13 RX ADMIN — POTASSIUM CHLORIDE 10 MEQ: 20 TABLET, EXTENDED RELEASE ORAL at 18:38

## 2018-07-13 RX ADMIN — DOCUSATE SODIUM 100 MG: 100 CAPSULE, LIQUID FILLED ORAL at 08:15

## 2018-07-13 RX ADMIN — ATORVASTATIN CALCIUM 20 MG: 20 TABLET, FILM COATED ORAL at 21:05

## 2018-07-13 RX ADMIN — Medication 10 ML: at 21:06

## 2018-07-13 RX ADMIN — ENOXAPARIN SODIUM 40 MG: 40 INJECTION SUBCUTANEOUS at 09:52

## 2018-07-13 RX ADMIN — FUROSEMIDE 20 MG: 10 INJECTION, SOLUTION INTRAMUSCULAR; INTRAVENOUS at 09:52

## 2018-07-13 RX ADMIN — Medication 3 G: at 04:13

## 2018-07-13 RX ADMIN — INSULIN GLARGINE 30 UNITS: 100 INJECTION, SOLUTION SUBCUTANEOUS at 11:25

## 2018-07-13 RX ADMIN — OXYCODONE HYDROCHLORIDE 10 MG: 5 TABLET ORAL at 03:12

## 2018-07-13 RX ADMIN — OXYCODONE HYDROCHLORIDE 5 MG: 5 TABLET ORAL at 07:51

## 2018-07-13 RX ADMIN — ASPIRIN 325 MG: 325 TABLET, DELAYED RELEASE ORAL at 08:15

## 2018-07-13 ASSESSMENT — PAIN SCALES - GENERAL
PAINLEVEL_OUTOF10: 4
PAINLEVEL_OUTOF10: 9
PAINLEVEL_OUTOF10: 4
PAINLEVEL_OUTOF10: 6
PAINLEVEL_OUTOF10: 5
PAINLEVEL_OUTOF10: 0
PAINLEVEL_OUTOF10: 9
PAINLEVEL_OUTOF10: 5
PAINLEVEL_OUTOF10: 5
PAINLEVEL_OUTOF10: 3
PAINLEVEL_OUTOF10: 4
PAINLEVEL_OUTOF10: 5
PAINLEVEL_OUTOF10: 5
PAINLEVEL_OUTOF10: 4
PAINLEVEL_OUTOF10: 6

## 2018-07-13 ASSESSMENT — PAIN DESCRIPTION - DESCRIPTORS: DESCRIPTORS: ACHING

## 2018-07-13 ASSESSMENT — PAIN DESCRIPTION - FREQUENCY: FREQUENCY: CONTINUOUS

## 2018-07-13 ASSESSMENT — PAIN DESCRIPTION - LOCATION: LOCATION: CHEST

## 2018-07-13 NOTE — PROGRESS NOTES
[facility] RESPIRATORY ASSESSMENT PROGRAM (RAP)  30 kg and over    Patient Name: Elinor Ramirez Room#: 4D-11/011-A : 1950     Admitting diagnosis: Atrial fibrillation ([address] [I48.91]        PATIENT HISTORY AND PHYSICAL ASSESSMENT     Surgical History   [] None  []General []Lower abdominal []Thoracic or upper  []Thoracic or upper with pulmonary disease  Chest X-ray    []Clear or none available   []Chronic radiological changes   []Infiltrates or atelectasis in one lobe   []Infiltrates or atelectasis in one or more lobe or rib fractures   []Infiltrates or atelectasis in one or more lobe AND rib fractures, two or more  Pulmonary History   [] Non- smoker no previous history   []Smoking hx quit > 6 months   []Current smoking history   []Previous pulmonary disease or acute pulmonary process    []Severe dyspnea or exacerbated chronic lung disease  Current Respiratory status   [] Regular pattern: RR 10-20 bpm   [] Tachypnea RR 20-25 bpm   [] Dyspnea on exertion.  Irregular pattern   [] Use of accessory muscles, prolonged exhalation or RR > 25bpm   [] Use of accessory muscles, prolonged exhalation or RR > 25bpm   [] Severe dyspnea; use of accessory muscles or RR > 30 bpm  Cough   []Strong non-productive   []Strong productive   []Weak, non-productive   []No spontaneous cough/may require suctioning  Sputum Color   []Clear/white   []Yellow   []Green   []Brown/tan/bloody   []None observed  Sputum Amount   []None   []Scant < 1 ml   []Small 1-3 ml   []Moderate 4-10 ml   []Large > 10 ml  Sputum Consistency   []Thin   []Thick   []Tenacious   []Unknown   []Other ***  Mental Status   []Alert, oriented and cooperative   []Disoriented sedated but follow commands   []Uncooperative or combative, does not follow commands   []Obtunded   []Comatose  Level of Activity   []Ambulatory   []Ambulates with assistance   []Temporarily non-ambulatory   []Paraplegic or bed rest, able to position self   []Quadriplegic or bed rest, unable to

## 2018-07-13 NOTE — PROGRESS NOTES
[address] THERAPY  STRZ ICU 4D  EVALUATION    Time:  Time In: 2639  Time Out: 0820  Timed Code Treatment Minutes: 15 Minutes  Minutes: 30          Date: 2018  Patient Name: Jude Rivas,   Gender: male      MRN: [id number]  : 1950  (70 y.o.)  Referring Practitioner: Dr. Jordyn Keller   Diagnosis: Afib   Additional Pertinent Hx: Pt s/p 5-box thoracoscopic maze procedure and  Ligation of the left atrial appendage on . 75year old male with 20+-year history of longstanding persistent atrial fibrillation, the anticoagulation for which was complicated by an episode of gastric bleeding,     Restrictions/Precautions:  Fall Risk                    Other position/activity restrictions: chest tubes x2, temp pacemaker pacing        Past Medical History:   Diagnosis Date    Arthritis     Atrial fibrillation ([address]     Cancer ([facility]     skin    Diabetes mellitus ([facility]     History of esophagogastroduodenoscopy (EGD) 16    History of kidney stones     Hyperlipidemia     Hypertension      Past Surgical History:   Procedure Laterality Date    BACK SURGERY  2017    CARDIAC CATHETERIZATION  ??    no stents    CHOLECYSTECTOMY  ??    COLONOSCOPY      last one ??    CYSTOSCOPY  2017    KIDNEY STONE SURGERY Left 2017    basket retrievel   [facility]? ??    arthroscopy, left    LITHOTRIPSY  ?? -  2016?? kidney stones    OTHER SURGICAL HISTORY  2016    LEFT ESWL    MN OFFICE/OUTPT VISIT,PROCEDURE ONLY N/A 2018    5--BOX THORASCOPIC MAZE WITH IGGY performed by Vijay Heath MD at [facility]  ??    right    SKIN BIOPSY      URETEROSCOPY Left 2017           Subjective  Chart Reviewed: Yes (completed medical chart review)  Patient assessed for rehabilitation services?: Yes    Subjective: Pt sitting up in bedside chair and agreeable to OT session.  Marguerite Lim RN approvign session as well this date. General:       Vision: Impaired  Vision Exceptions: Wears glasses at all times    Hearing: Within functional limits         Pain:  Pain Assessment  Patient Currently in Pain: Yes  Pain Assessment: 0-10  Pain Level: 4  Pain Location: Chest  Pain Descriptors: Aching  Pain Frequency: Continuous  Pain Intervention(s): RN notified       Social/Functional History:  Lives With: Spouse  Type of Home: House  Home Layout: One level  Home Access: Stairs to enter with rails  Home Equipment: Rolling walker     Bathroom Shower/Tub: Tub/Shower unit  Bathroom Toilet: Handicap height  Bathroom Accessibility: Accessible    Receives Help From: Family  ADL Assistance: Independent          Ambulation Assistance: Independent  Transfer Assistance: Independent          Additional Comments: 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     Objective        Overall Cognitive Status: WNL         Sensation  Overall Sensation Status: WNL                           LUE AROM (degrees)  LUE AROM : WNL          RUE AROM (degrees)  RUE AROM : WNL       LUE Strength  Gross LUE Strength: WFL                RUE Strength  Gross RUE Strength: WFL                     RUE Tone: Normotonic  LUE Tone: Normotonic                    ADL  Grooming: Contact guard assistance (standing to wash hands)  LE Dressing: Dependent/Total     Bed mobility  Sit to Supine: Maximum assistance (x2)    Transfers  Sit to stand: Minimal assistance (from bedside chair with eudcaiton on hand placement )  Stand to sit: Minimal assistance (onto EOB with education to reach back for surface )    Balance  Sitting Balance: Stand by assistance (at EOB )  Standing Balance: Contact guard assistance     Time: x1 min   Activity: washign hands and static standing at walker      Functional Mobility  Functional - Mobility Device: Rolling Walker  Assist Level: Contact guard assistance  Functional Mobility Comments: in room with unsteadyiness noted but no need for precautions in place, Gait belt, Call light within reach, Nurse notified, Patient at risk for falls, Left in bed    Plan:  Times per week: 6x  Current Treatment Recommendations: Balance Training, Endurance Training, Patient/Caregiver Education & Training, Self-Care / ADL, Safety Education & Training    Goals:  Patient goals : go home with spouse     Short term goals  Time Frame for Short term goals: 2 weeks   Short term goal 1: Pt to complete LB ADL tasks with less tahn min A   Short term goal 2: Pt to demo toilet transfer and toileting tasks with CGA and no vcs for safety   Short term goal 3: Pt to increase activity tolerance to demo functional mobility HH distances with CGA and no increase in SOB to complete ADL tasks at home   Long term goals  Time Frame for Long term goals : not est d/t ELOS     Evaluation Complexity: Based on the findings of patient history, examination, clinical presentation, and decision making during this evaluation, this patient is of medium complexity. OT G-codes  Functional Limitation: Self care  Self Care Current Status (): At least 40 percent but less than 60 percent impaired, limited or restricted  Self Care Goal Status ():  At least 20 percent but less than 40 percent impaired, limited or restricted  AM-PAC Inpatient Daily Activity Raw Score: 16  AM-PAC Inpatient ADL T-Scale Score : 35.96  ADL Inpatient CMS 0-100% Score: 53.32  ADL Inpatient CMS G-Code Modifier : CK

## 2018-07-13 NOTE — PROGRESS NOTES
1415-attempted to call report to 4b.   1435-attempted to call report to 4b.  1505-transferred to  via wheelchair - all belongings with pt.

## 2018-07-13 NOTE — ANESTHESIA POSTPROCEDURE EVALUATION
Department of Anesthesiology  Postprocedure Note    Patient: Eusebio Valle  MRN: [id number]  YOB: 1950  Date of evaluation: 7/13/2018  Time:  7:49 AM     Procedure Summary     Date:  07/12/18 Room / Location:  [place] [address] / [facility]    Anesthesia Start:  0749 Anesthesia Stop:  1642    Procedure:  5--BOX THORASCOPIC MAZE WITH IGGY (N/A ) Diagnosis:  (atrial fibrillation)    Surgeon:  Bola Adorno MD Responsible Provider:  Mortimer Maus, MD    Anesthesia Type:  general ASA Status:  3          Anesthesia Type: general    Vivien Phase I: Vivien Score: 10    Vivien Phase II:      Last vitals: Reviewed and per EMR flowsheets.        Anesthesia Post Evaluation    Patient location during evaluation: ICU  Patient participation: complete - patient participated  Level of consciousness: awake and alert  Airway patency: patent  Nausea & Vomiting: no nausea and no vomiting  Complications: no  Cardiovascular status: blood pressure returned to baseline and hemodynamically stable  Respiratory status: acceptable, room air and spontaneous ventilation  Hydration status: euvolemic

## 2018-07-13 NOTE — PLAN OF CARE
Problem: DISCHARGE BARRIERS  Goal: Patient's continuum of care needs are met  Outcome: Ongoing  Patient planning home with wife at discharge and declined hh, states wife retired from home care, See SW note.

## 2018-07-13 NOTE — PROGRESS NOTES
[facility] RESPIRATORY ASSESSMENT PROGRAM (RAP)  30 kg and over      Patient Name: Yuki Watts Room#: 4D-11/011-A : 1950     Admitting diagnosis: Atrial fibrillation ([address] [I48.91]      ASSESSMENT     Vitals  Pulse: 80   Resp: 13  BP: (!) 144/67  SpO2: 94 %  Temp: 99.1 °F (37.3 °C)  Breath Sounds:clear ant  Comment: Pt reaching 1000ml on I.S which is 40% of predicted    PEF   Predicted: n/a  Personal Best: n/a     Inspiratory Capacity:   Preoperative/predictive value: 2500 ml   33% of value: 825 ml      75% of value: 1875 ml   10 ml/kg of IBW: 770 ml   Patient's Actual Inspiratory Capacity: 1000 ml    CARE PLAN  All Care Plan selections must be based on the approved algorithms located on line in the Policy and Procedures under Respiratory Assessment Adult Protocol Handbook    INDICATIONS FOR THERAPY BASED ON HISTORY AND ASSESSMENT  Bronchial Hygiene Goal: Improvement in sputum mobilization in patients with ineffective airway clearance. Reverse the atelectasis. [] Atelectasis caused by mucus plugging     [] Chronic mucucillary clearance disorder  [] Improve cough effectiveness. Copious secretions unable to clear with cough or suctioning.    [x] No indications  Volume Expansion Goal: Prevent atelectasis, Absence or improvement in signs of atelectasis, improve respiratory muscle performance  [x] Post Thoracic or upper abdominal surgery    [] Surgery in COPD patients  [x] Atelectasis, reduced lung volume on X-ray    [] CPAP indications are seen  [] Restrictive pulmonary or neuromuscular disorder   [] No indications  Inhaled Medications Goal: Improve respiratory functions in patients with airway disease and decrease WOB  [] Wheezing, diminished, or absent breath sounds associated with a pulmonary disorder  [] Known COPD  [] Peak flow < 80% predicted or personal best for known asthma patients  [] Documented obstructive defect on pulmonary function testing which is reversible   [] With a mucolytic to prevent Bronchial Hygiene   []Sputum production > 25 ml/day       [] Improved chest x-ray  []Patients subjective response (easier clearance of secretions)      [] Improved breath sounds  []Improvement in PaO2 or oxygen saturation       [] Return of patient to home regime   []Improvement in ventilator variables    [x]Other: n/a. Volume Expansion  []Decrease respiratory rate   []Resolution of fever    []Normal pulse rate    []Improved breath sounds   []Normal chest x-ray     [] Improved arterial oxygen tension (PaO2) and p(A-a)O2  []Return of IC to 75% of preop/predicted goal or an increase to 15 ml/kg of ideal body weight   [x]Other: continue to work with I.S. On own . Inhaled Medication  []Improved assessment score   []Return of patient to home regime  [x]Other: n/a. Oxygenation  []SpO2 greater than or equal to 92%   []Reversed signs of hypoxemia and improvement in WOB  []Correction of Hgb disorder    []Return of patient to home regime  [x]Other: use PRN as needed . Action Plan Based on Assessment:   [x]Continue plan as ordered   []Change therapy based on algorithm   []Consult with physician  []Discontinue therapy and RAP (indications no longer present)  []Not enough information available, reassess in 24 hours  []Other:     Comments: Pt is doing well, he said he has already walked in the halls this morning. Pt is reaching 40% of predicted on his I.S.   Will have pt continue to work on own with I.S.

## 2018-07-13 NOTE — PLAN OF CARE
07/13/18 3:58 PM    SCIP core measures:  Surgery stop time:1603  BB within 24 hours of start time AND POD 1 or 2-no, paced  Atb last dose prior to 24 hours post-op (48 for CABG)-yes  Correct DVT prophylaxis within 24 hours post-op-yes  [place] removed or reordered with reason by:no

## 2018-07-13 NOTE — PROGRESS NOTES
[facility]  PHYSICAL THERAPY MISSED TREATMENT NOTE  ACUTE CARE  STR ICU 4D              Missed Treatment  Pt in bed when attempted at [phone]. Pt declined PT right now due to having just finished eating and hadn't been back in bed long. See tomorrow.

## 2018-07-13 NOTE — PROGRESS NOTES
position self  Pulmonary Function Test   [x]None available   []Normal   []Obstructive  []Restrictive   []Diffusion defect        LAB  Hematology:   Lab Results   Component Value Date    WBC 24.4 07/12/2018    RBC 4.67 07/12/2018    HGB 13.7 07/12/2018    HCT 42.1 07/12/2018     07/12/2018     Chemistry:   Lab Results   Component Value Date    PH 7.34 07/12/2018    PO2 98 07/12/2018    PCO2 42 07/12/2018    HCO3 23 07/12/2018    O2SAT 97 07/12/2018         Home pulmonary medications: none  Home Bipap or CPAP No  Pulmonary Diagnosis none

## 2018-07-13 NOTE — CARE COORDINATION
7/13/18, 3:51 PM      Mauro Riedel       Admitted from: Surgery 7/12/2018/ 631 Health system Ext day: 1   Location: -09/009-A Reason for admit: Atrial fibrillation [facility]) [I48.91] Status: IP  Admit order signed?: yes  PMH:  has a past medical history of Arthritis; Atrial fibrillation ([facility] [address] Cancer ([facility] [address] Diabetes mellitus ([facility] [address] [facility] History of esophagogastroduodenoscopy (EGD); History of kidney stones; Hyperlipidemia; and Hypertension. Procedure:   7/12 5 box maze  7/12 Intubated - 7/12 Extubated  7/13 CXR:   Interval extubation. Mild pulmonary edema. Improved retrocardiac left lower lobe atelectasis. Stable linear atelectasis at the lateral left base and stable atelectasis at the right base. Moderate gaseous distention of the stomach     Medications:  Scheduled Meds:   furosemide  20 mg Intravenous BID    potassium chloride  10 mEq Oral BID WC    insulin glargine  30 Units Subcutaneous BID    potassium (CARDIAC) replacement protocol   Other RX Placeholder    sodium chloride flush  10 mL Intravenous 2 times per day    docusate sodium  100 mg Oral BID    [START ON 7/14/2018] famotidine  20 mg Oral BID    amiodarone  200 mg Oral BID    therapeutic multivitamin-minerals  1 tablet Oral Daily with breakfast    atorvastatin  20 mg Oral Nightly    enoxaparin  40 mg Subcutaneous Daily    aspirin  325 mg Oral Daily     Continuous Infusions:   sodium chloride 20 mL/hr at 07/12/18 1652    insulin (HUMAN R) non-weight based infusion 8.2 Units/hr (07/13/18 1502)      Pertinent Info/Orders/Treatment Plan: POD #1. Lines out. On room air. Ox4. CT x2 to -20sx. Atrially paced 80. Cardiac Rehab/PT/OT. Dietitian consulted. Dietary ileus prevention protocol. Telemetry, I&O, daily weight, IS, sternal precautions, wound care, CT care, SCDs, masters care, ambulate. Insulin drip, amio, asa, lipitor, lovenox, pepcid, IV lasix 20 mg bid, lantus,  Prn IV zofran, prn roxicodone, K+, Electrolyte replacement protocols.  Received

## 2018-07-14 ENCOUNTER — APPOINTMENT (OUTPATIENT)
Dept: GENERAL RADIOLOGY | Age: 68
DRG: 228 | End: 2018-07-14
Attending: THORACIC SURGERY (CARDIOTHORACIC VASCULAR SURGERY)
Payer: MEDICARE

## 2018-07-14 LAB
ANION GAP SERPL CALCULATED.3IONS-SCNC: 13 MEQ/L (ref 8–16)
BUN BLDV-MCNC: 19 MG/DL (ref 7–22)
CALCIUM SERPL-MCNC: 9.2 MG/DL (ref 8.5–10.5)
CHLORIDE BLD-SCNC: 101 MEQ/L (ref 98–111)
CO2: 25 MEQ/L (ref 23–33)
CREAT SERPL-MCNC: 1.1 MG/DL (ref 0.4–1.2)
EKG ATRIAL RATE: 122 BPM
EKG ATRIAL RATE: 59 BPM
EKG Q-T INTERVAL: 380 MS
EKG Q-T INTERVAL: 448 MS
EKG QRS DURATION: 92 MS
EKG QRS DURATION: 96 MS
EKG QTC CALCULATION (BAZETT): 439 MS
EKG QTC CALCULATION (BAZETT): 443 MS
EKG R AXIS: -6 DEGREES
EKG R AXIS: 12 DEGREES
EKG T AXIS: -12 DEGREES
EKG T AXIS: 12 DEGREES
EKG VENTRICULAR RATE: 58 BPM
EKG VENTRICULAR RATE: 82 BPM
ERYTHROCYTE [DISTWIDTH] IN BLOOD BY AUTOMATED COUNT: 14.8 % (ref 11.5–14.5)
ERYTHROCYTE [DISTWIDTH] IN BLOOD BY AUTOMATED COUNT: 47.5 FL (ref 35–45)
GFR SERPL CREATININE-BSD FRML MDRD: 66 ML/MIN/1.73M2
GLUCOSE BLD-MCNC: 100 MG/DL (ref 70–108)
GLUCOSE BLD-MCNC: 105 MG/DL (ref 70–108)
GLUCOSE BLD-MCNC: 105 MG/DL (ref 70–108)
GLUCOSE BLD-MCNC: 133 MG/DL (ref 70–108)
GLUCOSE BLD-MCNC: 139 MG/DL (ref 70–108)
GLUCOSE BLD-MCNC: 150 MG/DL (ref 70–108)
GLUCOSE BLD-MCNC: 61 MG/DL (ref 70–108)
GLUCOSE BLD-MCNC: 64 MG/DL (ref 70–108)
GLUCOSE BLD-MCNC: 88 MG/DL (ref 70–108)
GLUCOSE BLD-MCNC: 91 MG/DL (ref 70–108)
GLUCOSE BLD-MCNC: 93 MG/DL (ref 70–108)
GLUCOSE BLD-MCNC: 94 MG/DL (ref 70–108)
GLUCOSE BLD-MCNC: 96 MG/DL (ref 70–108)
GLUCOSE BLD-MCNC: 98 MG/DL (ref 70–108)
HCT VFR BLD CALC: 40.7 % (ref 42–52)
HEMOGLOBIN: 13.3 GM/DL (ref 14–18)
MCH RBC QN AUTO: 29.2 PG (ref 26–33)
MCHC RBC AUTO-ENTMCNC: 32.7 GM/DL (ref 32.2–35.5)
MCV RBC AUTO: 89.3 FL (ref 80–94)
PLATELET # BLD: 248 THOU/MM3 (ref 130–400)
PMV BLD AUTO: 9.9 FL (ref 9.4–12.4)
POTASSIUM SERPL-SCNC: 3.9 MEQ/L (ref 3.5–5.2)
RBC # BLD: 4.56 MILL/MM3 (ref 4.7–6.1)
SODIUM BLD-SCNC: 139 MEQ/L (ref 135–145)
WBC # BLD: 24.9 THOU/MM3 (ref 4.8–10.8)

## 2018-07-14 PROCEDURE — 2580000003 HC RX 258: Performed by: THORACIC SURGERY (CARDIOTHORACIC VASCULAR SURGERY)

## 2018-07-14 PROCEDURE — 80048 BASIC METABOLIC PNL TOTAL CA: CPT

## 2018-07-14 PROCEDURE — 82948 REAGENT STRIP/BLOOD GLUCOSE: CPT

## 2018-07-14 PROCEDURE — 97530 THERAPEUTIC ACTIVITIES: CPT

## 2018-07-14 PROCEDURE — G8979 MOBILITY GOAL STATUS: HCPCS

## 2018-07-14 PROCEDURE — 2060000000 HC ICU INTERMEDIATE R&B

## 2018-07-14 PROCEDURE — 85027 COMPLETE CBC AUTOMATED: CPT

## 2018-07-14 PROCEDURE — 93005 ELECTROCARDIOGRAM TRACING: CPT | Performed by: THORACIC SURGERY (CARDIOTHORACIC VASCULAR SURGERY)

## 2018-07-14 PROCEDURE — 6370000000 HC RX 637 (ALT 250 FOR IP): Performed by: THORACIC SURGERY (CARDIOTHORACIC VASCULAR SURGERY)

## 2018-07-14 PROCEDURE — G8978 MOBILITY CURRENT STATUS: HCPCS

## 2018-07-14 PROCEDURE — 71045 X-RAY EXAM CHEST 1 VIEW: CPT

## 2018-07-14 PROCEDURE — 97163 PT EVAL HIGH COMPLEX 45 MIN: CPT

## 2018-07-14 PROCEDURE — 36415 COLL VENOUS BLD VENIPUNCTURE: CPT

## 2018-07-14 PROCEDURE — 87086 URINE CULTURE/COLONY COUNT: CPT

## 2018-07-14 PROCEDURE — 6360000002 HC RX W HCPCS: Performed by: THORACIC SURGERY (CARDIOTHORACIC VASCULAR SURGERY)

## 2018-07-14 PROCEDURE — 93010 ELECTROCARDIOGRAM REPORT: CPT | Performed by: INTERNAL MEDICINE

## 2018-07-14 RX ADMIN — DOCUSATE SODIUM 100 MG: 100 CAPSULE, LIQUID FILLED ORAL at 09:41

## 2018-07-14 RX ADMIN — Medication 17.6 MG: at 11:58

## 2018-07-14 RX ADMIN — DOCUSATE SODIUM 100 MG: 100 CAPSULE, LIQUID FILLED ORAL at 20:17

## 2018-07-14 RX ADMIN — FUROSEMIDE 20 MG: 10 INJECTION, SOLUTION INTRAMUSCULAR; INTRAVENOUS at 09:40

## 2018-07-14 RX ADMIN — SODIUM CHLORIDE: 9 INJECTION, SOLUTION INTRAVENOUS at 09:56

## 2018-07-14 RX ADMIN — ASPIRIN 325 MG: 325 TABLET, DELAYED RELEASE ORAL at 09:41

## 2018-07-14 RX ADMIN — POTASSIUM CHLORIDE 10 MEQ: 20 TABLET, EXTENDED RELEASE ORAL at 09:40

## 2018-07-14 RX ADMIN — FAMOTIDINE 20 MG: 20 TABLET, FILM COATED ORAL at 09:40

## 2018-07-14 RX ADMIN — AMIODARONE HYDROCHLORIDE 200 MG: 200 TABLET ORAL at 20:17

## 2018-07-14 RX ADMIN — SODIUM CHLORIDE 7.3 UNITS/HR: 9 INJECTION, SOLUTION INTRAVENOUS at 10:53

## 2018-07-14 RX ADMIN — SODIUM CHLORIDE 3.4 UNITS/HR: 9 INJECTION, SOLUTION INTRAVENOUS at 17:54

## 2018-07-14 RX ADMIN — MAGNESIUM HYDROXIDE 30 ML: 400 SUSPENSION ORAL at 11:58

## 2018-07-14 RX ADMIN — ATORVASTATIN CALCIUM 20 MG: 20 TABLET, FILM COATED ORAL at 20:17

## 2018-07-14 RX ADMIN — OXYCODONE HYDROCHLORIDE 5 MG: 5 TABLET ORAL at 20:17

## 2018-07-14 RX ADMIN — Medication 10 ML: at 20:17

## 2018-07-14 RX ADMIN — AMIODARONE HYDROCHLORIDE 200 MG: 200 TABLET ORAL at 09:37

## 2018-07-14 RX ADMIN — FAMOTIDINE 20 MG: 20 TABLET, FILM COATED ORAL at 20:17

## 2018-07-14 RX ADMIN — INSULIN GLARGINE 30 UNITS: 100 INJECTION, SOLUTION SUBCUTANEOUS at 09:37

## 2018-07-14 RX ADMIN — OXYCODONE HYDROCHLORIDE 5 MG: 5 TABLET ORAL at 13:36

## 2018-07-14 RX ADMIN — MULTIPLE VITAMINS W/ MINERALS TAB 1 TABLET: TAB at 09:36

## 2018-07-14 RX ADMIN — FUROSEMIDE 20 MG: 10 INJECTION, SOLUTION INTRAMUSCULAR; INTRAVENOUS at 18:00

## 2018-07-14 RX ADMIN — SODIUM CHLORIDE: 9 INJECTION, SOLUTION INTRAVENOUS at 05:23

## 2018-07-14 RX ADMIN — POTASSIUM CHLORIDE 10 MEQ: 20 TABLET, EXTENDED RELEASE ORAL at 18:01

## 2018-07-14 RX ADMIN — ENOXAPARIN SODIUM 40 MG: 40 INJECTION SUBCUTANEOUS at 09:40

## 2018-07-14 ASSESSMENT — PAIN SCALES - GENERAL
PAINLEVEL_OUTOF10: 3
PAINLEVEL_OUTOF10: 4
PAINLEVEL_OUTOF10: 5

## 2018-07-14 ASSESSMENT — PAIN DESCRIPTION - PAIN TYPE: TYPE: ACUTE PAIN

## 2018-07-14 ASSESSMENT — PAIN DESCRIPTION - LOCATION: LOCATION: CHEST

## 2018-07-14 ASSESSMENT — PAIN DESCRIPTION - DESCRIPTORS: DESCRIPTORS: ACHING;DISCOMFORT

## 2018-07-14 NOTE — PLAN OF CARE
Problem: Pain:  Goal: Pain level will decrease  Pain level will decrease   Outcome: Ongoing  Pt taking roxycodone as needed for pain    Problem: Falls - Risk of:  Goal: Will remain free from falls  Will remain free from falls   Outcome: Ongoing  Pt at risk for falls due to chest tubes, IV tubing, and masters, pt does not attempt to get up without help, uses call light approrpriately, wears non skid socks when up    Problem: DISCHARGE BARRIERS  Goal: Patient's continuum of care needs are met  Outcome: Ongoing  Pt plans to be discharged home with his wife    Comments: Care plan reviewed with patient and wife. Patient and wife verbalize understanding of the plan of care and contribute to goal setting.

## 2018-07-14 NOTE — PROGRESS NOTES
pleasant and agrees to therapy    General:  Overall Orientation Status: Within Normal Limits  Follows Commands: Within Functional Limits    Vision: Impaired  Vision Exceptions: Wears glasses at all times    Hearing: Within functional limits         Pain:  Yes. Pain Assessment  Pain Assessment: 0-10  Pain Level: 3  Pain Type: Acute pain  Pain Location: Chest  Pain Descriptors: Aching;Discomfort       Social/Functional History:    Lives With: Spouse  Type of Home: House  Home Layout: One level  Home Access: Stairs to enter with rails  Entrance Stairs - Number of Steps: small threshold  Home Equipment: Rolling walker     Bathroom Shower/Tub: Tub/Shower unit  Bathroom Toilet: Handicap height  Bathroom Accessibility: Accessible    Receives Help From: Family  ADL Assistance: Independent     Ambulation Assistance: Independent  Transfer Assistance: Independent          Additional Comments: 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 No AD PTA, very active      Objective:       RLE AROM: WFL         LLE AROM : WFL    B LEs gross 4-/5    Sensation  Overall Sensation Status: WNL               Balance  Sitting - Static: Fair, +  Sitting - Dynamic: Fair, +  Standing - Static: Fair, +  Standing - Dynamic: Fair    Sit to Supine: Contact guard assistance (HOB elevated ~40 deg with use of rail. verabl cues for performance.  increased time to complete)    Transfers  Sit to Stand: Contact guard assistance (good safety)  Stand to sit: Contact guard assistance (verbal cues for hand placement, good eccentric lowering)       Ambulation 1  Surface: level tile  Device: Rolling Walker  Assistance: Contact guard assistance  Quality of Gait: decreased velocity and arie, decreased but equal step length B, slight forward flexed trunk, fair heel strike B, slight increase in labor of breathing  Distance: 15ft limited by chest tubes  Comments: verbal cues for upright trunk and proper breathing        Exercises:  Comments: seated ankle pumps, long arc quads, hip abd, and marches x10-12 reps each B with verbal and visual cues for performance to increase strength and improve mobility     Activity Tolerance:  Activity Tolerance: Patient Tolerated treatment well    Treatment Initiated: see exercises and ambulation    Assessment: Body structures, Functions, Activity limitations: Decreased functional mobility , Decreased strength, Decreased endurance, Decreased balance  Assessment: patient presents with decreased tolerance to activity with increased laborof breathing with all mobility this date. will benefit from skilled PT to improve level of mobility and independence prior to returning home  Prognosis: Good    Clinical Presentation: High - Unstable with Unpredictable Characteristics: recent surgery with complication, varying pain levels, decreased strength, decreased endurance, decreased balance, AD required:    Decision Making: High Complexitybased on patient assessment and decision making process of determining plan of care and establishing reasonable expectations for measurable functional outcomes    REQUIRES PT FOLLOW UP: Yes  Discharge Recommendations: Continue to assess pending progress    Patient Education:  Patient Education: POC, transfers, gait, bed mobility, breathing technique    Equipment Recommendations:  Equipment Needed: No    Safety:  Type of devices:  All fall risk precautions in place, Call light within reach, Gait belt, Patient at risk for falls, Bed alarm in place, Left in bed    Plan:  Times per week: 6x CABG protocol  Current Treatment Recommendations: Strengthening, Balance Training, Functional Mobility Training, Transfer Training, Gait Training, Endurance Training, Home Exercise Program, Safety Education & Training, Patient/Caregiver Education & Training, Equipment Evaluation, Education, & procurement, Stair training    Goals:  Patient goals : go home  Short term goals  Time Frame for Short term goals: 2

## 2018-07-14 NOTE — PROGRESS NOTES
Cardiovascular Surgery Progress Note      Comfortable on RA  Sinus rhythm 60s off atrial pacing  WBC down slightly to 24k  U/A negative  CXR ok  -Check urine culture  -Resume home insulin regimen  -Keep tubes in  -Continue IV diuresis

## 2018-07-15 ENCOUNTER — APPOINTMENT (OUTPATIENT)
Dept: GENERAL RADIOLOGY | Age: 68
DRG: 228 | End: 2018-07-15
Attending: THORACIC SURGERY (CARDIOTHORACIC VASCULAR SURGERY)
Payer: MEDICARE

## 2018-07-15 LAB
ANION GAP SERPL CALCULATED.3IONS-SCNC: 13 MEQ/L (ref 8–16)
BUN BLDV-MCNC: 21 MG/DL (ref 7–22)
CALCIUM SERPL-MCNC: 8.9 MG/DL (ref 8.5–10.5)
CHLORIDE BLD-SCNC: 99 MEQ/L (ref 98–111)
CO2: 27 MEQ/L (ref 23–33)
CREAT SERPL-MCNC: 1.1 MG/DL (ref 0.4–1.2)
ERYTHROCYTE [DISTWIDTH] IN BLOOD BY AUTOMATED COUNT: 14.6 % (ref 11.5–14.5)
ERYTHROCYTE [DISTWIDTH] IN BLOOD BY AUTOMATED COUNT: 49.1 FL (ref 35–45)
GFR SERPL CREATININE-BSD FRML MDRD: 66 ML/MIN/1.73M2
GLUCOSE BLD-MCNC: 158 MG/DL (ref 70–108)
GLUCOSE BLD-MCNC: 182 MG/DL (ref 70–108)
GLUCOSE BLD-MCNC: 252 MG/DL (ref 70–108)
GLUCOSE BLD-MCNC: 257 MG/DL (ref 70–108)
GLUCOSE BLD-MCNC: 270 MG/DL (ref 70–108)
GLUCOSE BLD-MCNC: 330 MG/DL (ref 70–108)
HCT VFR BLD CALC: 39.2 % (ref 42–52)
HEMOGLOBIN: 12.5 GM/DL (ref 14–18)
MCH RBC QN AUTO: 29.3 PG (ref 26–33)
MCHC RBC AUTO-ENTMCNC: 31.9 GM/DL (ref 32.2–35.5)
MCV RBC AUTO: 91.8 FL (ref 80–94)
PLATELET # BLD: 216 THOU/MM3 (ref 130–400)
PMV BLD AUTO: 10.5 FL (ref 9.4–12.4)
POTASSIUM SERPL-SCNC: 4.9 MEQ/L (ref 3.5–5.2)
RBC # BLD: 4.27 MILL/MM3 (ref 4.7–6.1)
SODIUM BLD-SCNC: 139 MEQ/L (ref 135–145)
WBC # BLD: 16.5 THOU/MM3 (ref 4.8–10.8)

## 2018-07-15 PROCEDURE — 6370000000 HC RX 637 (ALT 250 FOR IP): Performed by: THORACIC SURGERY (CARDIOTHORACIC VASCULAR SURGERY)

## 2018-07-15 PROCEDURE — 71045 X-RAY EXAM CHEST 1 VIEW: CPT

## 2018-07-15 PROCEDURE — 85027 COMPLETE CBC AUTOMATED: CPT

## 2018-07-15 PROCEDURE — 82948 REAGENT STRIP/BLOOD GLUCOSE: CPT

## 2018-07-15 PROCEDURE — 2060000000 HC ICU INTERMEDIATE R&B

## 2018-07-15 PROCEDURE — 2580000003 HC RX 258: Performed by: THORACIC SURGERY (CARDIOTHORACIC VASCULAR SURGERY)

## 2018-07-15 PROCEDURE — 6360000002 HC RX W HCPCS: Performed by: THORACIC SURGERY (CARDIOTHORACIC VASCULAR SURGERY)

## 2018-07-15 PROCEDURE — 36415 COLL VENOUS BLD VENIPUNCTURE: CPT

## 2018-07-15 PROCEDURE — 80048 BASIC METABOLIC PNL TOTAL CA: CPT

## 2018-07-15 PROCEDURE — 97110 THERAPEUTIC EXERCISES: CPT

## 2018-07-15 RX ORDER — FINASTERIDE 5 MG/1
5 TABLET, FILM COATED ORAL DAILY
Status: DISCONTINUED | OUTPATIENT
Start: 2018-07-15 | End: 2018-07-17 | Stop reason: HOSPADM

## 2018-07-15 RX ORDER — TAMSULOSIN HYDROCHLORIDE 0.4 MG/1
0.4 CAPSULE ORAL DAILY
Status: DISCONTINUED | OUTPATIENT
Start: 2018-07-15 | End: 2018-07-17 | Stop reason: HOSPADM

## 2018-07-15 RX ORDER — CARVEDILOL 6.25 MG/1
6.25 TABLET ORAL 2 TIMES DAILY WITH MEALS
Status: DISCONTINUED | OUTPATIENT
Start: 2018-07-15 | End: 2018-07-17 | Stop reason: HOSPADM

## 2018-07-15 RX ADMIN — DOCUSATE SODIUM 100 MG: 100 CAPSULE, LIQUID FILLED ORAL at 08:58

## 2018-07-15 RX ADMIN — POTASSIUM CHLORIDE 10 MEQ: 20 TABLET, EXTENDED RELEASE ORAL at 08:59

## 2018-07-15 RX ADMIN — INSULIN LISPRO 2 UNITS: 100 INJECTION, SOLUTION INTRAVENOUS; SUBCUTANEOUS at 21:52

## 2018-07-15 RX ADMIN — Medication 10 ML: at 21:50

## 2018-07-15 RX ADMIN — OXYCODONE HYDROCHLORIDE 5 MG: 5 TABLET ORAL at 00:19

## 2018-07-15 RX ADMIN — OXYCODONE HYDROCHLORIDE 5 MG: 5 TABLET ORAL at 11:01

## 2018-07-15 RX ADMIN — OXYCODONE HYDROCHLORIDE 5 MG: 5 TABLET ORAL at 21:47

## 2018-07-15 RX ADMIN — FAMOTIDINE 20 MG: 20 TABLET, FILM COATED ORAL at 21:49

## 2018-07-15 RX ADMIN — Medication 12 UNITS: at 11:48

## 2018-07-15 RX ADMIN — POTASSIUM CHLORIDE 10 MEQ: 20 TABLET, EXTENDED RELEASE ORAL at 18:06

## 2018-07-15 RX ADMIN — CARVEDILOL 6.25 MG: 6.25 TABLET, FILM COATED ORAL at 17:57

## 2018-07-15 RX ADMIN — ENOXAPARIN SODIUM 40 MG: 40 INJECTION SUBCUTANEOUS at 08:59

## 2018-07-15 RX ADMIN — CARVEDILOL 6.25 MG: 6.25 TABLET, FILM COATED ORAL at 11:43

## 2018-07-15 RX ADMIN — DOCUSATE SODIUM 100 MG: 100 CAPSULE, LIQUID FILLED ORAL at 21:49

## 2018-07-15 RX ADMIN — LORAZEPAM 0.5 MG: 0.5 TABLET ORAL at 21:47

## 2018-07-15 RX ADMIN — MULTIPLE VITAMINS W/ MINERALS TAB 1 TABLET: TAB at 08:59

## 2018-07-15 RX ADMIN — Medication 3 UNITS: at 17:57

## 2018-07-15 RX ADMIN — AMIODARONE HYDROCHLORIDE 200 MG: 200 TABLET ORAL at 08:57

## 2018-07-15 RX ADMIN — INSULIN HUMAN 40 UNITS: 100 INJECTION, SUSPENSION SUBCUTANEOUS at 21:53

## 2018-07-15 RX ADMIN — AMIODARONE HYDROCHLORIDE 200 MG: 200 TABLET ORAL at 22:24

## 2018-07-15 RX ADMIN — FUROSEMIDE 20 MG: 10 INJECTION, SOLUTION INTRAMUSCULAR; INTRAVENOUS at 08:59

## 2018-07-15 RX ADMIN — FINASTERIDE 5 MG: 5 TABLET, FILM COATED ORAL at 11:43

## 2018-07-15 RX ADMIN — FUROSEMIDE 20 MG: 10 INJECTION, SOLUTION INTRAMUSCULAR; INTRAVENOUS at 17:58

## 2018-07-15 RX ADMIN — LORAZEPAM 0.5 MG: 0.5 TABLET ORAL at 00:19

## 2018-07-15 RX ADMIN — Medication 10 ML: at 08:59

## 2018-07-15 RX ADMIN — ASPIRIN 325 MG: 325 TABLET, DELAYED RELEASE ORAL at 08:57

## 2018-07-15 RX ADMIN — ATORVASTATIN CALCIUM 20 MG: 20 TABLET, FILM COATED ORAL at 22:24

## 2018-07-15 RX ADMIN — TAMSULOSIN HYDROCHLORIDE 0.4 MG: 0.4 CAPSULE ORAL at 21:49

## 2018-07-15 RX ADMIN — INSULIN HUMAN 40 UNITS: 100 INJECTION, SUSPENSION SUBCUTANEOUS at 11:49

## 2018-07-15 RX ADMIN — FAMOTIDINE 20 MG: 20 TABLET, FILM COATED ORAL at 08:57

## 2018-07-15 ASSESSMENT — PAIN DESCRIPTION - ONSET: ONSET: GRADUAL

## 2018-07-15 ASSESSMENT — PAIN SCALES - GENERAL
PAINLEVEL_OUTOF10: 3
PAINLEVEL_OUTOF10: 5
PAINLEVEL_OUTOF10: 4
PAINLEVEL_OUTOF10: 0
PAINLEVEL_OUTOF10: 0
PAINLEVEL_OUTOF10: 5

## 2018-07-15 ASSESSMENT — PAIN DESCRIPTION - DESCRIPTORS: DESCRIPTORS: ACHING

## 2018-07-15 ASSESSMENT — PAIN DESCRIPTION - LOCATION: LOCATION: CHEST

## 2018-07-15 ASSESSMENT — PAIN DESCRIPTION - PAIN TYPE: TYPE: SURGICAL PAIN

## 2018-07-15 ASSESSMENT — PAIN DESCRIPTION - FREQUENCY: FREQUENCY: INTERMITTENT

## 2018-07-15 ASSESSMENT — PAIN DESCRIPTION - PROGRESSION: CLINICAL_PROGRESSION: NOT CHANGED

## 2018-07-15 NOTE — PROGRESS NOTES
Pt on insulin gtt. Checked chem at 2200- blood sugar 61. Insulin gtt stopped. Pt asymptomatic. Pt given 2 orange juice and some ice cream. Blood sugar rechecked 20 min later showed chem 88. Will continue to monitor.

## 2018-07-15 NOTE — PROGRESS NOTES
Cardiovascular Surgery Progress Note      Comfortable on RA  Sinus rhythm; sinus pause with reset from brief episode flutter overnight  CXR ok  WBC down to 16.5; U/A & culture negative  -Continue amiodarone for usual 3-4 wks of prophylaxis  -Continue IV diuresis  -Keep tubes in

## 2018-07-15 NOTE — FLOWSHEET NOTE
7197 - Paged Dr. Diego Harper regarding blood sugar and insulin coverage. 12 - Dr. Diego Harper at bedside to evaluate. Updates provided. Stated to remove masters catheter. Started sliding scale lispro at high dose coverage. Started flomax, proscar, and coreg PO.

## 2018-07-15 NOTE — PLAN OF CARE
Problem: Pain:  Goal: Pain level will decrease  Pain level will decrease   Outcome: Ongoing  Patient rating pain as 4/10. Pain goal 3/10. PRN pain medication given. Pt satisfied. Will continue to monitor. Problem: Falls - Risk of:  Goal: Will remain free from falls  Will remain free from falls   Outcome: Ongoing  Pt up with two assist. Non slid socks on feet. Pathway free of clutter. Bed in lowest position with alarm on. Personal belongings and call light within reach. Problem: Cardiovascular  Goal: No DVT, peripheral vascular complications  Outcome: Ongoing  No s/s of DVT. Will continue to monitor. Problem: Respiratory  Goal: O2 Sat > 90%  Outcome: Ongoing  O2 sat> 90% on room air. Problem: Skin Integrity/Risk  Goal: No skin breakdown during hospitalization  Outcome: Ongoing  No new signs of skin breakdown noted. Will continue to monitor.

## 2018-07-16 ENCOUNTER — APPOINTMENT (OUTPATIENT)
Dept: CARDIAC CATH/INVASIVE PROCEDURES | Age: 68
DRG: 228 | End: 2018-07-16
Attending: THORACIC SURGERY (CARDIOTHORACIC VASCULAR SURGERY)
Payer: MEDICARE

## 2018-07-16 ENCOUNTER — APPOINTMENT (OUTPATIENT)
Dept: GENERAL RADIOLOGY | Age: 68
DRG: 228 | End: 2018-07-16
Attending: THORACIC SURGERY (CARDIOTHORACIC VASCULAR SURGERY)
Payer: MEDICARE

## 2018-07-16 LAB
ANION GAP SERPL CALCULATED.3IONS-SCNC: 13 MEQ/L (ref 8–16)
BUN BLDV-MCNC: 23 MG/DL (ref 7–22)
CALCIUM SERPL-MCNC: 8.9 MG/DL (ref 8.5–10.5)
CHLORIDE BLD-SCNC: 102 MEQ/L (ref 98–111)
CO2: 27 MEQ/L (ref 23–33)
CREAT SERPL-MCNC: 1 MG/DL (ref 0.4–1.2)
ERYTHROCYTE [DISTWIDTH] IN BLOOD BY AUTOMATED COUNT: 14.1 % (ref 11.5–14.5)
ERYTHROCYTE [DISTWIDTH] IN BLOOD BY AUTOMATED COUNT: 46.3 FL (ref 35–45)
GFR SERPL CREATININE-BSD FRML MDRD: 74 ML/MIN/1.73M2
GLUCOSE BLD-MCNC: 121 MG/DL (ref 70–108)
GLUCOSE BLD-MCNC: 174 MG/DL (ref 70–108)
GLUCOSE BLD-MCNC: 200 MG/DL (ref 70–108)
GLUCOSE BLD-MCNC: 286 MG/DL (ref 70–108)
GLUCOSE BLD-MCNC: 70 MG/DL (ref 70–108)
HCT VFR BLD CALC: 39.5 % (ref 42–52)
HEMOGLOBIN: 12.7 GM/DL (ref 14–18)
MCH RBC QN AUTO: 28.9 PG (ref 26–33)
MCHC RBC AUTO-ENTMCNC: 32.2 GM/DL (ref 32.2–35.5)
MCV RBC AUTO: 89.8 FL (ref 80–94)
PLATELET # BLD: 220 THOU/MM3 (ref 130–400)
PMV BLD AUTO: 10.1 FL (ref 9.4–12.4)
POTASSIUM SERPL-SCNC: 3.9 MEQ/L (ref 3.5–5.2)
RBC # BLD: 4.4 MILL/MM3 (ref 4.7–6.1)
SODIUM BLD-SCNC: 142 MEQ/L (ref 135–145)
URINE CULTURE, ROUTINE: NORMAL
WBC # BLD: 14.4 THOU/MM3 (ref 4.8–10.8)

## 2018-07-16 PROCEDURE — 6360000002 HC RX W HCPCS: Performed by: THORACIC SURGERY (CARDIOTHORACIC VASCULAR SURGERY)

## 2018-07-16 PROCEDURE — 2709999900 HC NON-CHARGEABLE SUPPLY

## 2018-07-16 PROCEDURE — 5A2204Z RESTORATION OF CARDIAC RHYTHM, SINGLE: ICD-10-PCS | Performed by: THORACIC SURGERY (CARDIOTHORACIC VASCULAR SURGERY)

## 2018-07-16 PROCEDURE — APPSS30 APP SPLIT SHARED TIME 16-30 MINUTES: Performed by: PHYSICIAN ASSISTANT

## 2018-07-16 PROCEDURE — 36415 COLL VENOUS BLD VENIPUNCTURE: CPT

## 2018-07-16 PROCEDURE — 92960 CARDIOVERSION ELECTRIC EXT: CPT | Performed by: THORACIC SURGERY (CARDIOTHORACIC VASCULAR SURGERY)

## 2018-07-16 PROCEDURE — 2060000000 HC ICU INTERMEDIATE R&B

## 2018-07-16 PROCEDURE — 97116 GAIT TRAINING THERAPY: CPT

## 2018-07-16 PROCEDURE — 82948 REAGENT STRIP/BLOOD GLUCOSE: CPT

## 2018-07-16 PROCEDURE — 2580000003 HC RX 258: Performed by: THORACIC SURGERY (CARDIOTHORACIC VASCULAR SURGERY)

## 2018-07-16 PROCEDURE — 85027 COMPLETE CBC AUTOMATED: CPT

## 2018-07-16 PROCEDURE — 6360000002 HC RX W HCPCS

## 2018-07-16 PROCEDURE — 80048 BASIC METABOLIC PNL TOTAL CA: CPT

## 2018-07-16 PROCEDURE — 6370000000 HC RX 637 (ALT 250 FOR IP): Performed by: THORACIC SURGERY (CARDIOTHORACIC VASCULAR SURGERY)

## 2018-07-16 PROCEDURE — 2500000003 HC RX 250 WO HCPCS

## 2018-07-16 PROCEDURE — 71045 X-RAY EXAM CHEST 1 VIEW: CPT

## 2018-07-16 PROCEDURE — 6370000000 HC RX 637 (ALT 250 FOR IP): Performed by: PHYSICIAN ASSISTANT

## 2018-07-16 RX ORDER — POLYETHYLENE GLYCOL 3350 17 G/17G
17 POWDER, FOR SOLUTION ORAL DAILY
Status: DISCONTINUED | OUTPATIENT
Start: 2018-07-16 | End: 2018-07-17 | Stop reason: HOSPADM

## 2018-07-16 RX ADMIN — FINASTERIDE 5 MG: 5 TABLET, FILM COATED ORAL at 08:59

## 2018-07-16 RX ADMIN — POLYETHYLENE GLYCOL (3350) 17 G: 17 POWDER, FOR SOLUTION ORAL at 15:42

## 2018-07-16 RX ADMIN — FUROSEMIDE 20 MG: 10 INJECTION, SOLUTION INTRAMUSCULAR; INTRAVENOUS at 09:00

## 2018-07-16 RX ADMIN — DOCUSATE SODIUM 100 MG: 100 CAPSULE, LIQUID FILLED ORAL at 09:00

## 2018-07-16 RX ADMIN — CARVEDILOL 6.25 MG: 6.25 TABLET, FILM COATED ORAL at 08:59

## 2018-07-16 RX ADMIN — FUROSEMIDE 20 MG: 10 INJECTION, SOLUTION INTRAMUSCULAR; INTRAVENOUS at 18:51

## 2018-07-16 RX ADMIN — TAMSULOSIN HYDROCHLORIDE 0.4 MG: 0.4 CAPSULE ORAL at 19:54

## 2018-07-16 RX ADMIN — MULTIPLE VITAMINS W/ MINERALS TAB 1 TABLET: TAB at 08:59

## 2018-07-16 RX ADMIN — AMIODARONE HYDROCHLORIDE 200 MG: 200 TABLET ORAL at 09:00

## 2018-07-16 RX ADMIN — INSULIN HUMAN 40 UNITS: 100 INJECTION, SUSPENSION SUBCUTANEOUS at 19:59

## 2018-07-16 RX ADMIN — FAMOTIDINE 20 MG: 20 TABLET, FILM COATED ORAL at 09:00

## 2018-07-16 RX ADMIN — ATORVASTATIN CALCIUM 20 MG: 20 TABLET, FILM COATED ORAL at 19:55

## 2018-07-16 RX ADMIN — ASPIRIN 325 MG: 325 TABLET, DELAYED RELEASE ORAL at 09:00

## 2018-07-16 RX ADMIN — Medication 6 UNITS: at 17:29

## 2018-07-16 RX ADMIN — ENOXAPARIN SODIUM 40 MG: 40 INJECTION SUBCUTANEOUS at 09:00

## 2018-07-16 RX ADMIN — DOCUSATE SODIUM 100 MG: 100 CAPSULE, LIQUID FILLED ORAL at 19:54

## 2018-07-16 RX ADMIN — FAMOTIDINE 20 MG: 20 TABLET, FILM COATED ORAL at 19:54

## 2018-07-16 RX ADMIN — CARVEDILOL 6.25 MG: 6.25 TABLET, FILM COATED ORAL at 17:29

## 2018-07-16 RX ADMIN — Medication 10 ML: at 18:51

## 2018-07-16 RX ADMIN — POTASSIUM CHLORIDE 10 MEQ: 20 TABLET, EXTENDED RELEASE ORAL at 17:26

## 2018-07-16 RX ADMIN — INSULIN LISPRO 2 UNITS: 100 INJECTION, SOLUTION INTRAVENOUS; SUBCUTANEOUS at 19:58

## 2018-07-16 RX ADMIN — Medication 10 ML: at 09:01

## 2018-07-16 RX ADMIN — Medication 10 ML: at 19:55

## 2018-07-16 RX ADMIN — Medication 9 UNITS: at 12:52

## 2018-07-16 RX ADMIN — POTASSIUM CHLORIDE 10 MEQ: 20 TABLET, EXTENDED RELEASE ORAL at 09:03

## 2018-07-16 RX ADMIN — INSULIN HUMAN 40 UNITS: 100 INJECTION, SUSPENSION SUBCUTANEOUS at 12:51

## 2018-07-16 RX ADMIN — AMIODARONE HYDROCHLORIDE 200 MG: 200 TABLET ORAL at 19:55

## 2018-07-16 ASSESSMENT — PAIN SCALES - GENERAL
PAINLEVEL_OUTOF10: 0
PAINLEVEL_OUTOF10: 0

## 2018-07-16 NOTE — PROGRESS NOTES
CT/CV Surgery Progress Note    2018 8:54 AM  Surgeon:  Dr. Nestor Tolentino:  Mr. Bernstein  Is resting comfortably in bedside chair on RA, alert, and in no acute distress. NSR. Cardioversion successful this AM. Pt denies chest pressure, SOB, fever, chills, N/V/D. Vital Signs: /61   Pulse 78   Temp 98 °F (36.7 °C) (Oral)   Resp 16   Ht 6' (1.829 m)   Wt 299 lb 14.4 oz (136 kg)   SpO2 94%   BMI 40.67 kg/m²    Temp (24hrs), Av.4 °F (36.9 °C), Min:98 °F (36.7 °C), Max:98.8 °F (37.1 °C)      Weight: 299 lb 14.4 oz (136 kg)       PULSE OXIMETRY RANGE: SpO2  Av.5 %  Min: 93 %  Max: 94 %  SUPPLEMENTAL O2: O2 Flow Rate (L/min): 0 L/min     Labs:   CBC:     Recent Labs      18   0513  07/15/18   0639  18   0614   WBC  24.9*  16.5*  14.4*   HGB  13.3*  12.5*  12.7*   HCT  40.7*  39.2*  39.5*   MCV  89.3  91.8  89.8   PLT  248  216  220     BMP: Recent Labs      18   0513  07/15/18   0639  18   0614   NA  139  139  142   K  3.9  4.9  3.9   CL  101  99  102   CO2  25  27  27   BUN  19  21  23*   CREATININE  1.1  1.1  1.0     Imaging:  CXR: I have reviewed the CXR image. \"Minimal worsening of the previously noted pulmonary venous congestion and interstitial pulmonary edema. \"      Intake/Output Summary (Last 24 hours) at 18 0854  Last data filed at 18 0500   Gross per 24 hour   Intake              330 ml   Output             2085 ml   Net            -1755 ml       Scheduled Meds:    methohexital (BREVITAL) IV syringe  100 mg Intravenous Once    insulin lispro  0-18 Units Subcutaneous TID WC    insulin lispro  0-9 Units Subcutaneous Nightly    tamsulosin  0.4 mg Oral Daily    finasteride  5 mg Oral Daily    carvedilol  6.25 mg Oral BID WC    insulin NPH  40 Units Subcutaneous BID WC    furosemide  20 mg Intravenous BID    potassium chloride  10 mEq Oral BID WC    potassium (CARDIAC) replacement protocol   Other RX Placeholder    sodium chloride flush 10 mL Intravenous 2 times per day    docusate sodium  100 mg Oral BID    famotidine  20 mg Oral BID    amiodarone  200 mg Oral BID    therapeutic multivitamin-minerals  1 tablet Oral Daily with breakfast    atorvastatin  20 mg Oral Nightly    enoxaparin  40 mg Subcutaneous Daily    aspirin  325 mg Oral Daily       ROS: All neg unless specifically mentioned in subjective section. Assessment:     Patient Active Problem List   Diagnosis    GI bleed not requiring more than 4 units of blood in 24 hours, ICU, or surgery    Osteoarthrosis of hip    Diabetes mellitus type 2 in obese ([facility] [address]    Current use of long term anticoagulation    Essential hypertension, benign    Hyperlipidemia with target LDL less than 70    Chronic nonallergic rhinitis    Kidney stone    Vasovagal syncope    Permanent atrial fibrillation (HCC)    Atrial fibrillation ([facility] [address]         Healthcare Directive: Yes, patient has an advance directive for healthcare treatment       Plan: 7/16/18  1. Clinically stable  2. NSR-successful cardioversion this AM  3. D/c chest tubes and wires this AM.   4. CXR reviewed. The plan of care was discussed in detail with Dr. Cruz Cons.     Don Farrar PA-C

## 2018-07-16 NOTE — PROGRESS NOTES
[address] [address]  Sedation/Analgesia History & Physical      Pt Name: Naz Tilley  MRN: [id number]  YOB: 1950  Provider Performing Procedure: Jose G Atkinson MD, [facility]  Primary Care Physician: Hanna Escalante MD      PRE-PROCEDURE   DNR-CCA/DNR-CC []Yes [x]No      PLANNED PROCEDURE     []Cath  []PCI                []Pacemaker/AICD  []IGGY             [x]Cardioversion []Peripheral angiography/PTA  []Other:        Consent:   The indication, risks and benefits of the procedure and possible therapeutic consequences and alternatives were discussed with the patient. The patient was given the opportunity to ask questions and to have them answered to his/her satisfaction. Risks of the procedure include but are not limited to the following: Bleeding, hematoma including retroperitoneal hematoma, infection, pain and discomfort, injury to the aorta and other blood vessels, rhythm disturbance, low blood pressure, myocardial infarction, stroke, kidney damage/failure, myocardial perforation, allergic reactions to sedatives/contrast material, loss of pulse/vascular compromise requiring surgery, aneurysm/pseudoaneurysm formation, possible loss of a limb/hand/leg, death. Alternatives to and omission of the suggested procedure were discussed. The patient had no further questions and wished to proceed; the consent form was signed.       Indications for the Procedure:     Atrial fibrillation        MEDICAL HISTORY        Past Medical History:   Diagnosis Date    Arthritis     Atrial fibrillation ([facility] [address]     Cancer ([facility] [address]     skin    Diabetes mellitus ([facility] [address]     History of esophagogastroduodenoscopy (EGD) 6/7/16    History of kidney stones     Hyperlipidemia     Hypertension          Past Surgical History:   Procedure Laterality Date    BACK SURGERY  05/2017    CARDIAC CATHETERIZATION  1995??    no stents    CHOLECYSTECTOMY  1984??    COLONOSCOPY      last one 2015??    CYSTOSCOPY spray by Nasal route daily as needed for Rhinitis or Allergies       finasteride (PROSCAR) 5 MG tablet   Take 5 mg by mouth daily Prostate      hydrochlorothiazide (HYDRODIURIL) 25 MG tablet Take 25 mg by mouth daily. Indications: High Blood Pressure      lovastatin (MEVACOR) 20 MG tablet Take 20 mg by mouth nightly. Indications: High Amount of Fats in the Blood      warfarin (COUMADIN) 2.5 MG tablet Take 1 tablet by mouth every evening Dosed by [facility].  90 tablet 3    Sodium Chloride-Sodium Bicarb (CLASSIC NETI POT SINUS WASH) 2300-700 MG KIT by Nasal route as needed Indications: Congestion of the Nasal Sinuses  1 kit        Current Facility-Administered Medications   Medication Dose Route Frequency Provider Last Rate Last Dose    methohexital (BREVITAL SODIUM) 100 mg in sterile water IV Syringe  100 mg Intravenous Once Anne Marie Riley MD        insulin lispro (HUMALOG) injection vial 0-18 Units  0-18 Units Subcutaneous TID  Anne Marie Riley MD   3 Units at 07/15/18 1757    insulin lispro (HUMALOG) injection vial 0-9 Units  0-9 Units Subcutaneous Nightly Anne Marie Riley MD   2 Units at 07/15/18 2152    tamsulosin (FLOMAX) capsule 0.4 mg  0.4 mg Oral Daily Anne Marie Riley MD   0.4 mg at 07/15/18 2149    finasteride (PROSCAR) tablet 5 mg  5 mg Oral Daily Anne Marie Riley MD   5 mg at 07/15/18 1143    carvedilol (COREG) tablet 6.25 mg  6.25 mg Oral BID  Anne Marie Riley MD   6.25 mg at 07/15/18 1757    insulin NPH (HUMULIN N;NOVOLIN N) injection vial 40 Units  40 Units Subcutaneous BID  Anne Marie Riley MD   40 Units at 07/15/18 2153    furosemide (LASIX) injection 20 mg  20 mg Intravenous BID Anne Marie Riley MD   20 mg at 07/15/18 1758    potassium chloride (KLOR-CON M) extended release tablet 10 mEq  10 mEq Oral BID  Anne Marie Riley MD   10 mEq at 07/15/18 1806    potassium (CARDIAC) replacement protocol   Other RX Placeholder Anne Marie Riley MD        LORazepam (ATIVAN) tablet 0.5 mg  0.5 mg Oral

## 2018-07-16 NOTE — PLAN OF CARE
Problem: Pain:  Goal: Pain level will decrease  Pain level will decrease   Outcome: Ongoing  Patient rating pain as 3-4/10. Pain goal 3/10. PRN pain medication given. Pt satisfied. Will continue to monitor.     Problem: Falls - Risk of:  Goal: Will remain free from falls  Will remain free from falls   Outcome: Ongoing  Pt up with two assist. Non slid socks on feet. Pathway free of clutter. Bed in lowest position with alarm on. Personal belongings and call light within reach.     Problem: Cardiovascular  Goal: No DVT, peripheral vascular complications  Outcome: Ongoing  No s/s of DVT. Will continue to monitor.     Problem: Respiratory  Goal: O2 Sat > 90%  Outcome: Ongoing  O2 sat> 90% on room air.      Problem: Skin Integrity/Risk  Goal: No skin breakdown during hospitalization  Outcome: Ongoing  No new signs of skin breakdown noted.  Will continue to monitor.

## 2018-07-16 NOTE — PROGRESS NOTES
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 No AD PTA, very active    Subjective:     Subjective: RN approved session. Pt up in bedside chair at arrival, agreeable to amb and perform therex. Pt pleasant and motivated. Pain:   .  Pain Assessment  Pain Level: 0       Social/Functional:  Lives With: Spouse  Type of Home: House  Home Layout: One level  Home Access: Stairs to enter with rails  Entrance Stairs - Number of Steps: small threshold  Home Equipment: Rolling walker     Objective:       Transfers  Sit to Stand: Contact guard assistance  Stand to sit: Contact guard assistance       Ambulation 1  Surface: level tile  Device: Rolling Walker  Assistance: Contact guard assistance  Quality of Gait: decreased velocity and arie, decreased but equal step length B, slight forward flexed trunk, fair heel strike B, steady, no LOB  Distance: [id number]          Exercises:  Exercises  Comments: none at this time d/t pt wanting to sit on toilet           Activity Tolerance:  Activity Tolerance: Patient Tolerated treatment well    Assessment: Body structures, Functions, Activity limitations: Decreased functional mobility , Decreased strength, Decreased endurance, Decreased balance  Assessment: Pt tolerated session well at this time. Upon returning to room pt requested to use restroom. Pt sitting on toilet stated he wanted to sit for awhile. RN aware pt given call light. Pt would benefit from cont therapy at this time. Prognosis: Good  REQUIRES PT FOLLOW UP: Yes  Discharge Recommendations: Continue to assess pending progress    Patient Education:  Patient Education: ambualtion, transfers,POC    Equipment Recommendations:  Equipment Needed: No    Safety:  Type of devices:  All fall risk precautions in place, Call light within reach, Gait belt, Patient at risk for falls, Bed alarm in place, Left in bed    Plan:  Times per week: 6x CABG protocol  Current Treatment Recommendations: Strengthening, Balance Training, Functional Mobility Training, Transfer Training, Gait Training, Endurance Training, Home Exercise Program, Safety Education & Training, Patient/Caregiver Education & Training, Equipment Evaluation, Education, & procurement, Stair training    Goals:  Patient goals : go home    Short term goals  Time Frame for Short term goals: 2 weeks  Short term goal 1: patient to perform bed mobility at S to get in and out of bed  Short term goal 2: patient to perform transfers to and from various surfaces at S to rise from bed or chair  Short term goal 3: patient to ambulate 100ft with least restrictive device at S for household mobility  Short term goal 4: patient to negotiate small threshold platform step with least restrictive device at S for home access    Long term goals  Time Frame for Long term goals : defer d/t short ELOS            AM-PAC Inpatient Mobility without Stair Climbing Raw Score : 15  AM-PAC Inpatient without Stair Climbing T-Scale Score : 43.03  Mobility Inpatient CMS 0-100% Score: 47.43  Mobility Inpatient without Stair CMS G-Code Modifier : CK

## 2018-07-16 NOTE — PROGRESS NOTES
PHASE 1 CARDIAC REHABILITATION   CABG, AVR, MVR, TVR, AMI, PCI's  Exercise Physiologists      Treatment Length (L: Long, N: Normal, S: Short): N  Treatment Length Note:   Vitals Stable?: Yes. If No:     Any ECG-Rhythm Issues?: No.  If Yes:   Transfers (bc: Bed to Chair, cb: Chair to Bed): n/a  Strengthening/ROM Treatment Exercises: Step 2    FIDM:     Frequency: 2 x per day   Intensity: <15 RPE, <120bpm   Time: >30-60 seconds longer or >20% increase in distance each walk   Type: Bed/Chair Exercises/stationary marching or ambulation    Aerobic treatment: Sit to stand independently  Ambulation down to the shower door and return to the chair with feet elevated. Patient wanted to use the walker this treatment yet. .          Gait (i: Independent, s: Steady, u: Unsteady): S  Assists: 1  Patient tolerated treatment (w: well, f: fair, p: poor): W  Goals:    Short term:  Progression on each aerobic treatment. Long term: Independent ambulation and discharge. Ivin Grounds is to follow sternal precautions. Will learn techniques for getting in and out of bed/chairs/car without using the arms. Using stairs will be addressed if applicable. Home activity instructions (Frequency, Intensity, Time, Type), and progression will be addressed prior to discharge (unless Ivin Grounds goes to TCU or an ECF where they will follow PT protocols). Notes: Call light left within reach.     Education given:   Phase II Information (Given upon Discharge):

## 2018-07-16 NOTE — PROGRESS NOTES
[facility] [address]  OCCUPATIONAL THERAPY MISSED TREATMENT NOTE  [facility] CV[facility] 4B  [id number]      Date: 2018  Patient Name: Jason Bui        CSN: [id number]   : 1950  (70 y.o.)  Gender: male   Referring Practitioner: Dr. Alicia Romo   Diagnosis: Afib          REASON FOR MISSED TREATMENT:  Missed Treat. Attempted to see pt this am and he was off floor for a procedure. Attempted to see pt later this am and he was with cardio rehab. Attempted to see this pm and pt declined to participate In therapy had jsut gotten back in bed and had seen PT earlier.   Will attempt again tomorrow as time allows

## 2018-07-17 ENCOUNTER — APPOINTMENT (OUTPATIENT)
Dept: GENERAL RADIOLOGY | Age: 68
DRG: 228 | End: 2018-07-17
Attending: THORACIC SURGERY (CARDIOTHORACIC VASCULAR SURGERY)
Payer: MEDICARE

## 2018-07-17 VITALS
DIASTOLIC BLOOD PRESSURE: 78 MMHG | SYSTOLIC BLOOD PRESSURE: 167 MMHG | RESPIRATION RATE: 16 BRPM | HEIGHT: 72 IN | HEART RATE: 59 BPM | OXYGEN SATURATION: 95 % | BODY MASS INDEX: 40.48 KG/M2 | TEMPERATURE: 97.7 F | WEIGHT: 298.9 LBS

## 2018-07-17 LAB
ANION GAP SERPL CALCULATED.3IONS-SCNC: 11 MEQ/L (ref 8–16)
BUN BLDV-MCNC: 22 MG/DL (ref 7–22)
CALCIUM SERPL-MCNC: 8.9 MG/DL (ref 8.5–10.5)
CHLORIDE BLD-SCNC: 101 MEQ/L (ref 98–111)
CO2: 29 MEQ/L (ref 23–33)
CREAT SERPL-MCNC: 1 MG/DL (ref 0.4–1.2)
ERYTHROCYTE [DISTWIDTH] IN BLOOD BY AUTOMATED COUNT: 14.2 % (ref 11.5–14.5)
ERYTHROCYTE [DISTWIDTH] IN BLOOD BY AUTOMATED COUNT: 45.8 FL (ref 35–45)
GFR SERPL CREATININE-BSD FRML MDRD: 74 ML/MIN/1.73M2
GLUCOSE BLD-MCNC: 104 MG/DL (ref 70–108)
GLUCOSE BLD-MCNC: 130 MG/DL (ref 70–108)
GLUCOSE BLD-MCNC: 226 MG/DL (ref 70–108)
HCT VFR BLD CALC: 37.7 % (ref 42–52)
HEMOGLOBIN: 12.2 GM/DL (ref 14–18)
MCH RBC QN AUTO: 28.9 PG (ref 26–33)
MCHC RBC AUTO-ENTMCNC: 32.4 GM/DL (ref 32.2–35.5)
MCV RBC AUTO: 89.3 FL (ref 80–94)
PLATELET # BLD: 241 THOU/MM3 (ref 130–400)
PMV BLD AUTO: 10.8 FL (ref 9.4–12.4)
POTASSIUM SERPL-SCNC: 4.2 MEQ/L (ref 3.5–5.2)
RBC # BLD: 4.22 MILL/MM3 (ref 4.7–6.1)
SODIUM BLD-SCNC: 141 MEQ/L (ref 135–145)
WBC # BLD: 14.4 THOU/MM3 (ref 4.8–10.8)

## 2018-07-17 PROCEDURE — 97535 SELF CARE MNGMENT TRAINING: CPT

## 2018-07-17 PROCEDURE — APPSS60 APP SPLIT SHARED TIME 46-60 MINUTES: Performed by: PHYSICIAN ASSISTANT

## 2018-07-17 PROCEDURE — 36415 COLL VENOUS BLD VENIPUNCTURE: CPT

## 2018-07-17 PROCEDURE — 80048 BASIC METABOLIC PNL TOTAL CA: CPT

## 2018-07-17 PROCEDURE — 2580000003 HC RX 258: Performed by: THORACIC SURGERY (CARDIOTHORACIC VASCULAR SURGERY)

## 2018-07-17 PROCEDURE — 97116 GAIT TRAINING THERAPY: CPT

## 2018-07-17 PROCEDURE — 97530 THERAPEUTIC ACTIVITIES: CPT

## 2018-07-17 PROCEDURE — 6360000002 HC RX W HCPCS: Performed by: THORACIC SURGERY (CARDIOTHORACIC VASCULAR SURGERY)

## 2018-07-17 PROCEDURE — 82948 REAGENT STRIP/BLOOD GLUCOSE: CPT

## 2018-07-17 PROCEDURE — 6370000000 HC RX 637 (ALT 250 FOR IP): Performed by: PHYSICIAN ASSISTANT

## 2018-07-17 PROCEDURE — 85027 COMPLETE CBC AUTOMATED: CPT

## 2018-07-17 PROCEDURE — 6370000000 HC RX 637 (ALT 250 FOR IP): Performed by: THORACIC SURGERY (CARDIOTHORACIC VASCULAR SURGERY)

## 2018-07-17 PROCEDURE — 71046 X-RAY EXAM CHEST 2 VIEWS: CPT

## 2018-07-17 PROCEDURE — 97110 THERAPEUTIC EXERCISES: CPT

## 2018-07-17 RX ORDER — CARVEDILOL 6.25 MG/1
6.25 TABLET ORAL 2 TIMES DAILY WITH MEALS
Qty: 60 TABLET | Refills: 3 | Status: SHIPPED | OUTPATIENT
Start: 2018-07-17 | End: 2018-09-19 | Stop reason: SDUPTHER

## 2018-07-17 RX ORDER — POTASSIUM CHLORIDE 20 MEQ/1
20 TABLET, EXTENDED RELEASE ORAL DAILY
Qty: 60 TABLET | Refills: 3 | Status: SHIPPED | OUTPATIENT
Start: 2018-07-17 | End: 2018-08-22 | Stop reason: SDUPTHER

## 2018-07-17 RX ORDER — FUROSEMIDE 20 MG/1
20 TABLET ORAL DAILY
Qty: 60 TABLET | Refills: 3 | Status: ON HOLD | OUTPATIENT
Start: 2018-07-17 | End: 2018-08-15

## 2018-07-17 RX ORDER — AMIODARONE HYDROCHLORIDE 200 MG/1
200 TABLET ORAL 2 TIMES DAILY
Qty: 30 TABLET | Refills: 3 | Status: SHIPPED | OUTPATIENT
Start: 2018-07-17 | End: 2018-07-30 | Stop reason: SDUPTHER

## 2018-07-17 RX ORDER — FAMOTIDINE 20 MG/1
20 TABLET, FILM COATED ORAL 2 TIMES DAILY
Qty: 60 TABLET | Refills: 3 | Status: SHIPPED | OUTPATIENT
Start: 2018-07-17 | End: 2019-12-10

## 2018-07-17 RX ORDER — ATORVASTATIN CALCIUM 20 MG/1
20 TABLET, FILM COATED ORAL NIGHTLY
Qty: 30 TABLET | Refills: 3 | Status: SHIPPED | OUTPATIENT
Start: 2018-07-17 | End: 2018-10-30 | Stop reason: SDUPTHER

## 2018-07-17 RX ADMIN — ENOXAPARIN SODIUM 40 MG: 40 INJECTION SUBCUTANEOUS at 09:03

## 2018-07-17 RX ADMIN — FINASTERIDE 5 MG: 5 TABLET, FILM COATED ORAL at 09:03

## 2018-07-17 RX ADMIN — FAMOTIDINE 20 MG: 20 TABLET, FILM COATED ORAL at 09:03

## 2018-07-17 RX ADMIN — FUROSEMIDE 20 MG: 10 INJECTION, SOLUTION INTRAMUSCULAR; INTRAVENOUS at 09:03

## 2018-07-17 RX ADMIN — AMIODARONE HYDROCHLORIDE 200 MG: 200 TABLET ORAL at 09:03

## 2018-07-17 RX ADMIN — INSULIN HUMAN 40 UNITS: 100 INJECTION, SUSPENSION SUBCUTANEOUS at 13:01

## 2018-07-17 RX ADMIN — POLYETHYLENE GLYCOL (3350) 17 G: 17 POWDER, FOR SOLUTION ORAL at 09:04

## 2018-07-17 RX ADMIN — Medication 10 ML: at 09:03

## 2018-07-17 RX ADMIN — CARVEDILOL 6.25 MG: 6.25 TABLET, FILM COATED ORAL at 07:57

## 2018-07-17 RX ADMIN — DOCUSATE SODIUM 100 MG: 100 CAPSULE, LIQUID FILLED ORAL at 09:03

## 2018-07-17 RX ADMIN — MULTIPLE VITAMINS W/ MINERALS TAB 1 TABLET: TAB at 07:57

## 2018-07-17 RX ADMIN — ASPIRIN 325 MG: 325 TABLET, DELAYED RELEASE ORAL at 09:03

## 2018-07-17 RX ADMIN — MAGESIUM CITRATE 296 ML: 1.75 LIQUID ORAL at 10:26

## 2018-07-17 RX ADMIN — POTASSIUM CHLORIDE 10 MEQ: 20 TABLET, EXTENDED RELEASE ORAL at 07:57

## 2018-07-17 RX ADMIN — Medication 6 UNITS: at 13:00

## 2018-07-17 ASSESSMENT — PAIN SCALES - GENERAL
PAINLEVEL_OUTOF10: 0
PAINLEVEL_OUTOF10: 2
PAINLEVEL_OUTOF10: 0

## 2018-07-17 ASSESSMENT — PAIN DESCRIPTION - LOCATION: LOCATION: CHEST

## 2018-07-17 ASSESSMENT — PAIN DESCRIPTION - PAIN TYPE: TYPE: SURGICAL PAIN

## 2018-07-17 NOTE — PLAN OF CARE
Problem: Pain:  Goal: Pain level will decrease  Pain level will decrease   Outcome: Ongoing  Patient not c/o pain this shift. PRN pain medication available when needed. Pt satisfied. Will continue to monitor.     Problem: Falls - Risk of:  Goal: Will remain free from falls  Will remain free from falls   Outcome: Ongoing  Pt up with one assist. Non slid socks on feet. Pathway free of clutter. Bed in lowest position with alarm on. Personal belongings and call light within reach.     Problem: Cardiovascular  Goal: No DVT, peripheral vascular complications  Outcome: Ongoing  No s/s of DVT. Will continue to monitor.     Problem: Respiratory  Goal: O2 Sat > 90%  Outcome: Ongoing  O2 sat> 92% on room air.      Problem: Skin Integrity/Risk  Goal: No skin breakdown during hospitalization  Outcome: Ongoing  No new signs of skin breakdown noted.  Will continue to monitor.

## 2018-07-17 NOTE — PROGRESS NOTES
PHASE 1 CARDIAC REHABILITATION   CABG, AVR, MVR, TVR, AMI, PCI's  Exercise Physiologists      Treatment Length (L: Long, N: Normal, S: Short): N  Treatment Length Note:   Vitals Stable?: Yes. Any ECG-Rhythm Issues?: No.    Transfers (bc: Bed to Chair, cb: Chair to Bed): na  Strengthening/ROM Treatment Exercises: Step na    FIDM:     Frequency: 2 x per day   Intensity: <15 RPE, <120bpm   Time: >30-60 seconds longer or >20% increase in distance each walk   Type: Bed/Chair Exercises/stationary marching or ambulation    Aerobic treatment: Pt ambulated 180 ft        Gait (i: Independent, s: Steady, u: Unsteady): s  Assists: 1  Patient tolerated treatment (w: well, f: fair, p: poor): w  Goals:    Short term:  Progression on each aerobic treatment. Long term: Independent ambulation and discharge. James Johnson is to follow sternal precautions. Will learn techniques for getting in and out of bed/chairs/car without using the arms. Using stairs will be addressed if applicable. Home activity instructions (Frequency, Intensity, Time, Type), and progression will be addressed prior to discharge (unless James Johnson goes to TCU or an ECF where they will follow PT protocols). Notes: Call light left within reach.     Education given:   Phase II Information (Given upon Discharge):

## 2018-07-17 NOTE — PROGRESS NOTES
Subjective: nursing ok'd therapy reported that he is discharged home today if he has a BM, he was agreeable to get up and walk, discussed walking program and progression for home going     Pain:   .  Pain Assessment  Pain Level: 0       Social/Functional:  Lives With: Spouse  Type of Home: House  Home Layout: One level  Home Access: Stairs to enter with rails  Entrance Stairs - Number of Steps: small threshold  Home Equipment: Rolling walker     Objective:       Transfers  Sit to Stand: Stand by assistance (from chair, pt asked that I not hold onto him he wanted to see if he could do it on his own )  Stand to sit: Stand by assistance       Ambulation 1  Device: [address]: Stand by assistance  Quality of Gait: slow arie and flexed posture pt leaning heavily on walker discussed not to lean too heavily on walker and to stand erect, he walked limited distance in room no AD with arms in high guarded position and decreased step length recommended use of walker for now   Distance: 250x1          Exercises:  Exercises  Comments: pt completed seated hip flexion, long arc quads, heel raises, toe raises x 15 reps each         Activity Tolerance:  Activity Tolerance: Patient Tolerated treatment well    Assessment: Body structures, Functions, Activity limitations: Decreased functional mobility , Decreased strength, Decreased endurance, Decreased balance  Assessment: pt tolerated sesison fair, plan is for discarge   Prognosis: Good  REQUIRES PT FOLLOW UP: Yes  Discharge Recommendations: Continue to assess pending progress    Patient Education:  Patient Education: ambualtion, transfers,POC    Equipment Recommendations:  Equipment Needed: No    Safety:  Type of devices:  All fall risk precautions in place, Call light within reach, Gait belt, Patient at risk for falls, Bed alarm in place, Left in bed    Plan:  Times per week: 6x CABG protocol  Current Treatment Recommendations: Strengthening, Balance Training, Functional Mobility Training, Transfer Training, Gait Training, Endurance Training, Home Exercise Program, Safety Education & Training, Patient/Caregiver Education & Training, Equipment Evaluation, Education, & procurement, Stair training    Goals:  Patient goals : go home    Short term goals  Time Frame for Short term goals: 2 weeks  Short term goal 1: patient to perform bed mobility at S to get in and out of bed  Short term goal 2: patient to perform transfers to and from various surfaces at S to rise from bed or chair  Short term goal 3: patient to ambulate 100ft with least restrictive device at S for household mobility  Short term goal 4: patient to negotiate small threshold platform step with least restrictive device at S for home access    Long term goals  Time Frame for Long term goals : defer d/t short ELOS

## 2018-07-17 NOTE — PROGRESS NOTES
CT/CV Surgery Progress Note    2018 7:40 AM  Surgeon:  Dr. Esmer Morales:  Mr. Chikis Elizabeth  Is resting comfortably in bedside chair on RA, alert, and in no acute distress. NSR. Pt denies chest pressure, SOB, fever, chills, N/V/D. Vital Signs: /65   Pulse 57   Temp 98.2 °F (36.8 °C) (Oral)   Resp 17   Ht 6' (1.829 m)   Wt 298 lb 14.4 oz (135.6 kg)   SpO2 95%   BMI 40.54 kg/m²     Temp (24hrs), Av.3 °F (36.8 °C), Min:98 °F (36.7 °C), Max:98.6 °F (37 °C)      Weight: 298 lb 14.4 oz (135.6 kg)       PULSE OXIMETRY RANGE: SpO2  Av.7 %  Min: 94 %  Max: 96 %  SUPPLEMENTAL O2: O2 Flow Rate (L/min): 0 L/min     Labs:   CBC:     Recent Labs      07/15/18   0639  18   0614  18   0439   WBC  16.5*  14.4*  14.4*   HGB  12.5*  12.7*  12.2*   HCT  39.2*  39.5*  37.7*   MCV  91.8  89.8  89.3   PLT  216  220  241     BMP:   Recent Labs      07/15/18   0639  18   0614  18   0439   NA  139  142  141   K  4.9  3.9  4.2   CL  99  102  101   CO2  27  27  29   BUN  21  23*  22   CREATININE  1.1  1.0  1.0     Imaging:  CXR: I have reviewed the CXR image. 1. Interval improving vascular clearance of pulmonary congestion. 2. Stable postsurgical changes noted.        Intake/Output Summary (Last 24 hours) at 18 0740  Last data filed at 18 0403   Gross per 24 hour   Intake             1485 ml   Output              875 ml   Net              610 ml       Scheduled Meds:    magnesium citrate  296 mL Oral Once    methohexital (BREVITAL) IV syringe  100 mg Intravenous Once    polyethylene glycol  17 g Oral Daily    insulin lispro  0-18 Units Subcutaneous TID WC    insulin lispro  0-9 Units Subcutaneous Nightly    tamsulosin  0.4 mg Oral Daily    finasteride  5 mg Oral Daily    carvedilol  6.25 mg Oral BID WC    insulin NPH  40 Units Subcutaneous BID WC    furosemide  20 mg Intravenous BID    potassium chloride  10 mEq Oral BID WC    potassium (CARDIAC) replacement protocol   Other RX Placeholder    sodium chloride flush  10 mL Intravenous 2 times per day    docusate sodium  100 mg Oral BID    famotidine  20 mg Oral BID    amiodarone  200 mg Oral BID    therapeutic multivitamin-minerals  1 tablet Oral Daily with breakfast    atorvastatin  20 mg Oral Nightly    enoxaparin  40 mg Subcutaneous Daily    aspirin  325 mg Oral Daily       ROS: All neg unless specifically mentioned in subjective section. Assessment:     Patient Active Problem List   Diagnosis    GI bleed not requiring more than 4 units of blood in 24 hours, ICU, or surgery    Osteoarthrosis of hip    Diabetes mellitus type 2 in obese ([address]    Current use of long term anticoagulation    Essential hypertension, benign    Hyperlipidemia with target LDL less than 70    Chronic nonallergic rhinitis    Kidney stone    Vasovagal syncope    Permanent atrial fibrillation (HCC)    Atrial fibrillation ([facility] [address]         Healthcare Directive: Yes, patient has an advance directive for healthcare treatment       Plan: 7/16/18  1. Clinically stable  2. NSR  3. Needs BM before d/c- Ordered mag citrate. The plan of care was discussed in detail with Dr. Katey Pang.     Shashi Pino PA-C

## 2018-07-17 NOTE — PROGRESS NOTES
Chrissjack Nelsonbenita 59  INPATIENT OCCUPATIONAL THERAPY  [facility] CVICU 4B  DAILY NOTE    Time:  Time In: 809  Time Out: 4113  Timed Code Treatment Minutes: 32 Minutes  Minutes: 27        Date: 2018  Patient Name: Drea Foreman,   Gender: male      Room: [facility]009-A  MRN: [id number]  : 1950  (70 y.o.)  Referring Practitioner: Dr. Mary Maciel   Diagnosis: Afib   Additional Pertinent Hx: Pt s/p 5-box thoracoscopic maze procedure and  Ligation of the left atrial appendage on . 75year old male with 20+-year history of longstanding persistent atrial fibrillation, the anticoagulation for which was complicated by an episode of gastric bleeding,     Past Medical History:   Diagnosis Date    Arthritis     Atrial fibrillation ([address]     Cancer ([facility]     skin    Diabetes mellitus ([facility]     History of esophagogastroduodenoscopy (EGD) 16    History of kidney stones     Hyperlipidemia     Hypertension      Past Surgical History:   Procedure Laterality Date    BACK SURGERY  2017    CARDIAC CATHETERIZATION  ??    no stents    CHOLECYSTECTOMY  ??    COLONOSCOPY      last one ??    CYSTOSCOPY  2017    KIDNEY STONE SURGERY Left 2017    basket retrievel   [facility]? ??    arthroscopy, left    LITHOTRIPSY  ?? -  2016?? kidney stones    OTHER SURGICAL HISTORY  2016    LEFT ESWL    OH OFFICE/OUTPT VISIT,PROCEDURE ONLY N/A 2018    5--BOX THORASCOPIC MAZE WITH IGGY performed by David Simpson MD at [address] [place] [address]  ??    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  No Recommendations: Other: continue to assess     Safety:  Safety Devices in place: Yes  Type of devices:  All fall risk precautions in place, Gait belt, Call light within reach, Nurse notified, Left in chair, Chair alarm in place    Plan:  Times per week: 6x  Current Treatment Recommendations: Balance Training, Endurance Training, Patient/Caregiver Education & Training, Self-Care / ADL, Safety Education & Training    Goals:  Patient goals : go home with spouse     Short term goals  Time Frame for Short term goals: 2 weeks   Short term goal 1: Pt to complete LB ADL tasks with less tahn min A   Short term goal 2: Pt to demo toilet transfer and toileting tasks with CGA and no vcs for safety   Short term goal 3: Pt to increase activity tolerance to demo functional mobility HH distances with CGA and no increase in SOB to complete ADL tasks at home   Long term goals  Time Frame for Long term goals : not est d/t YARELISOS

## 2018-07-17 NOTE — PROGRESS NOTES
Discharge teaching and instructions for medications completed with patient using teachback method. AVS reviewed and questions answered. Cristiano Pollack, Pharm. D., BCPS 7/17/2018 4:32 PM

## 2018-07-19 ENCOUNTER — APPOINTMENT (OUTPATIENT)
Dept: PHARMACY | Age: 68
End: 2018-07-19
Payer: MEDICARE

## 2018-07-23 ENCOUNTER — HOSPITAL ENCOUNTER (OUTPATIENT)
Dept: PHARMACY | Age: 68
Setting detail: THERAPIES SERIES
Discharge: HOME | End: 2018-07-23
Payer: MEDICARE

## 2018-07-23 DIAGNOSIS — I48.21 PERMANENT ATRIAL FIBRILLATION (HCC): ICD-10-CM

## 2018-07-23 LAB — POC INR: 3.1 (ref 0.8–1.2)

## 2018-07-23 PROCEDURE — 85610 PROTHROMBIN TIME: CPT | Performed by: PHARMACIST

## 2018-07-23 PROCEDURE — 36416 COLLJ CAPILLARY BLOOD SPEC: CPT | Performed by: PHARMACIST

## 2018-07-23 PROCEDURE — 99211 OFF/OP EST MAY X REQ PHY/QHP: CPT | Performed by: PHARMACIST

## 2018-07-23 NOTE — PROGRESS NOTES
given    [] declined   [x] received previously   [] plans to receive at a later time   [] refused    [x] documented in [facility]

## 2018-07-24 ENCOUNTER — TELEPHONE (OUTPATIENT)
Dept: CARDIOTHORACIC SURGERY | Age: 68
End: 2018-07-24

## 2018-07-24 NOTE — TELEPHONE ENCOUNTER
Mr. Reva Jimenes called in to give an average of his BP takes. He states on average it's running 120/70, but today its 114/71 and pulse is 57. He states when moving around, he feels lightheaded to the point of passing out and then has to sit down. All water is not coming out of his feet as well. Other than these symptoms, he's feeling pretty good. Please advise. Thank you.

## 2018-07-25 ENCOUNTER — HOSPITAL ENCOUNTER (OUTPATIENT)
Dept: GENERAL RADIOLOGY | Age: 68
Discharge: HOME | End: 2018-07-25
Payer: MEDICARE

## 2018-07-25 ENCOUNTER — APPOINTMENT (OUTPATIENT)
Dept: CARDIAC CATH/INVASIVE PROCEDURES | Age: 68
End: 2018-07-25
Attending: INTERNAL MEDICINE
Payer: MEDICARE

## 2018-07-25 ENCOUNTER — HOSPITAL ENCOUNTER (OUTPATIENT)
Dept: INPATIENT UNIT | Age: 68
Discharge: HOME | End: 2018-07-25
Attending: INTERNAL MEDICINE | Admitting: INTERNAL MEDICINE
Payer: MEDICARE

## 2018-07-25 ENCOUNTER — TELEPHONE (OUTPATIENT)
Dept: CARDIOTHORACIC SURGERY | Age: 68
End: 2018-07-25

## 2018-07-25 ENCOUNTER — OFFICE VISIT (OUTPATIENT)
Dept: CARDIOTHORACIC SURGERY | Age: 68
End: 2018-07-25
Payer: MEDICARE

## 2018-07-25 ENCOUNTER — HOSPITAL ENCOUNTER (OUTPATIENT)
Age: 68
Discharge: HOME | End: 2018-07-25
Payer: MEDICARE

## 2018-07-25 VITALS
HEIGHT: 72 IN | DIASTOLIC BLOOD PRESSURE: 87 MMHG | WEIGHT: 302.8 LBS | BODY MASS INDEX: 41.01 KG/M2 | SYSTOLIC BLOOD PRESSURE: 141 MMHG | HEART RATE: 106 BPM

## 2018-07-25 VITALS
HEIGHT: 72 IN | DIASTOLIC BLOOD PRESSURE: 96 MMHG | BODY MASS INDEX: 40.9 KG/M2 | TEMPERATURE: 98 F | WEIGHT: 302 LBS | RESPIRATION RATE: 16 BRPM | HEART RATE: 107 BPM | OXYGEN SATURATION: 94 % | SYSTOLIC BLOOD PRESSURE: 170 MMHG

## 2018-07-25 DIAGNOSIS — Z86.79 S/P MAZE OPERATION FOR ATRIAL FIBRILLATION: ICD-10-CM

## 2018-07-25 DIAGNOSIS — I48.91 ATRIAL FIBRILLATION, UNSPECIFIED TYPE (HCC): Primary | ICD-10-CM

## 2018-07-25 DIAGNOSIS — Z98.890 S/P MAZE OPERATION FOR ATRIAL FIBRILLATION: ICD-10-CM

## 2018-07-25 DIAGNOSIS — I48.92 ATRIAL FLUTTER, UNSPECIFIED TYPE (HCC): Primary | ICD-10-CM

## 2018-07-25 LAB
EKG ATRIAL RATE: 271 BPM
EKG ATRIAL RATE: 86 BPM
EKG ATRIAL RATE: 93 BPM
EKG P AXIS: 162 DEGREES
EKG P-R INTERVAL: 160 MS
EKG Q-T INTERVAL: 358 MS
EKG Q-T INTERVAL: 368 MS
EKG Q-T INTERVAL: 376 MS
EKG QRS DURATION: 92 MS
EKG QRS DURATION: 94 MS
EKG QRS DURATION: 96 MS
EKG QTC CALCULATION (BAZETT): 452 MS
EKG QTC CALCULATION (BAZETT): 467 MS
EKG QTC CALCULATION (BAZETT): 477 MS
EKG R AXIS: 16 DEGREES
EKG R AXIS: 24 DEGREES
EKG R AXIS: 53 DEGREES
EKG T AXIS: -124 DEGREES
EKG T AXIS: 117 DEGREES
EKG T AXIS: 150 DEGREES
EKG VENTRICULAR RATE: 101 BPM
EKG VENTRICULAR RATE: 93 BPM
EKG VENTRICULAR RATE: 96 BPM
GLUCOSE BLD-MCNC: 70 MG/DL (ref 70–108)
GLUCOSE BLD-MCNC: 84 MG/DL (ref 70–108)

## 2018-07-25 PROCEDURE — 92960 CARDIOVERSION ELECTRIC EXT: CPT

## 2018-07-25 PROCEDURE — 2709999900 HC NON-CHARGEABLE SUPPLY

## 2018-07-25 PROCEDURE — 71046 X-RAY EXAM CHEST 2 VIEWS: CPT

## 2018-07-25 PROCEDURE — 99024 POSTOP FOLLOW-UP VISIT: CPT | Performed by: THORACIC SURGERY (CARDIOTHORACIC VASCULAR SURGERY)

## 2018-07-25 PROCEDURE — 2580000003 HC RX 258: Performed by: INTERNAL MEDICINE

## 2018-07-25 PROCEDURE — 93000 ELECTROCARDIOGRAM COMPLETE: CPT | Performed by: THORACIC SURGERY (CARDIOTHORACIC VASCULAR SURGERY)

## 2018-07-25 PROCEDURE — 93010 ELECTROCARDIOGRAM REPORT: CPT | Performed by: INTERNAL MEDICINE

## 2018-07-25 PROCEDURE — 82948 REAGENT STRIP/BLOOD GLUCOSE: CPT

## 2018-07-25 PROCEDURE — 2500000003 HC RX 250 WO HCPCS

## 2018-07-25 PROCEDURE — 93005 ELECTROCARDIOGRAM TRACING: CPT | Performed by: THORACIC SURGERY (CARDIOTHORACIC VASCULAR SURGERY)

## 2018-07-25 PROCEDURE — 93005 ELECTROCARDIOGRAM TRACING: CPT | Performed by: INTERNAL MEDICINE

## 2018-07-25 PROCEDURE — 92960 CARDIOVERSION ELECTRIC EXT: CPT | Performed by: INTERNAL MEDICINE

## 2018-07-25 RX ORDER — SODIUM CHLORIDE 0.9 % (FLUSH) 0.9 %
10 SYRINGE (ML) INJECTION PRN
Status: DISCONTINUED | OUTPATIENT
Start: 2018-07-25 | End: 2018-07-25 | Stop reason: HOSPADM

## 2018-07-25 RX ORDER — SODIUM CHLORIDE 0.9 % (FLUSH) 0.9 %
10 SYRINGE (ML) INJECTION EVERY 12 HOURS SCHEDULED
Status: DISCONTINUED | OUTPATIENT
Start: 2018-07-25 | End: 2018-07-25 | Stop reason: HOSPADM

## 2018-07-25 RX ORDER — SODIUM CHLORIDE 9 MG/ML
INJECTION, SOLUTION INTRAVENOUS CONTINUOUS
Status: DISCONTINUED | OUTPATIENT
Start: 2018-07-25 | End: 2018-07-25 | Stop reason: HOSPADM

## 2018-07-25 RX ADMIN — SODIUM CHLORIDE: 9 INJECTION, SOLUTION INTRAVENOUS at 12:42

## 2018-07-25 ASSESSMENT — PAIN DESCRIPTION - ORIENTATION: ORIENTATION: LEFT;UPPER

## 2018-07-25 ASSESSMENT — PAIN SCALES - GENERAL: PAINLEVEL_OUTOF10: 3

## 2018-07-25 ASSESSMENT — PAIN DESCRIPTION - FREQUENCY: FREQUENCY: INTERMITTENT

## 2018-07-25 ASSESSMENT — PAIN DESCRIPTION - DESCRIPTORS: DESCRIPTORS: JABBING

## 2018-07-25 ASSESSMENT — PAIN DESCRIPTION - PAIN TYPE: TYPE: ACUTE PAIN

## 2018-07-25 ASSESSMENT — PAIN DESCRIPTION - LOCATION: LOCATION: ABDOMEN

## 2018-07-25 NOTE — H&P
[facility] [address] [place] [address], [address] [facility]  Dept: [phone]  Dept Fax: [phone]        CARDIAC ELECTROPHYSIOLOGY  CONSULTATION     12/5/2017        REFERRING PROVIDER:  Dr. Rita Bell:  I want to stop taking my coumadin       Chief Complaint   Patient presents with    New Patient       discuss watchman    Atrial Fibrillation         HPI:  HPI  The patient is a 76 y/o male with permanent atrial fibrillation for the past twenty years. His ventricular rate is well controlled and he is asymptomatic. He is on coumadin for chronic anticoagulation. The patient has a h/o GI bleeding secondary to a gastric ulcer 4 years ago. He did have successful electrocautery performed and has not had a recurrent bleed since then. However, the patient is not allowed to take any NSAIDS. The patient suffers from arthritis and wants to be able to take pain medications when needed. He would like to have the left atrial appendage occluding device as an alternative to continued anticoagulation. Dr. Chino Flynn is also concerned about the patient's h/o vasodepressor near syncope and his risk of falling. 07/25/2018: The patient is a 69 y/o male with a h/o permanent atrial fibrillation. He underwent an external MAZE procedure by Dr. Delia Torrez on 7/12/2018. The patient reports converting back to atrial fibrillation this week. He was in to see Dr. Delia Torrez and his ECG showed atrial fibrillation with a ventricular repsone rate of 97 bpm.  The patient was sent to the hospital for a cardioversion. He is on ASA, warfarin, and amiodarone.       PMH:  History obtained from chart review and the patient.   Past Medical History        Past Medical History:   Diagnosis Date    Arthritis      Atrial fibrillation ([address]      Cancer (HCC)       skin    Diabetes mellitus ([facility]      History of esophagogastroduodenoscopy (EGD) 6/7/16    History of kidney stones      Hyperlipidemia      Hypertension              PSH:  Past Surgical History         Past Surgical History:   Procedure Laterality Date    CARDIAC CATHETERIZATION   1995??     no stents    CHOLECYSTECTOMY   1984??    COLONOSCOPY         last one 2015??    CYSTOSCOPY   05/02/2017    KIDNEY STONE SURGERY Left 05/02/2017     basket retrievel    KNEE SURGERY   1988? ??     arthroscopy, left    LITHOTRIPSY   1990?? -  2016??     kidney stones    OTHER SURGICAL HISTORY   5/26/2016     LEFT ESWL    ROTATOR CUFF REPAIR   2000? ?     right    SKIN BIOPSY        URETEROSCOPY Left 05/02/2017            FAMILY HISTORY:  Family History         Family History   Problem Relation Age of Onset    Arthritis Mother      Diabetes Mother      Heart Disease Mother      High Blood Pressure Mother      Stroke Mother      Diabetes Father      High Blood Pressure Father              SOCIAL HISTORY:  Social History   Social History            Social History    Marital status:        Spouse name: N/A    Number of children: N/A    Years of education: N/A            Social History Main Topics    Smoking status: Never Smoker    Smokeless tobacco: Never Used    Alcohol use No    Drug use: No    Sexual activity: Yes       Partners: Female           Other Topics Concern    None          Social History Narrative    None            MEDICATIONS:  Current Facility-Administered Medications          Current Outpatient Prescriptions   Medication Sig Dispense Refill    digoxin (LANOXIN) 125 MCG tablet Take 125 mcg by mouth daily         ipratropium (ATROVENT) 0.06 % nasal spray by Nasal route See Admin Instructions         warfarin (COUMADIN) 2.5 MG tablet Take 2.5 mg by mouth every evening        metoprolol tartrate (LOPRESSOR) 50 MG tablet take 1 tablet by mouth twice a day 180 tablet 1    docusate sodium (COLACE) 100 MG capsule Take 1 capsule by mouth daily as needed for Constipation 30 capsule 1    tamsulosin (FLOMAX) 0.4 MG capsule Take 1 capsule by

## 2018-07-25 NOTE — PROGRESS NOTES
Pt with 2.56 sec pause on monitor. Dr Monica Ramirez on unit and reviewed strip. Pt is asymptomatic, /90. Dr Monica Ramirez states patient is trying to convert to a sinus rhythm on his own. After the pause, patient does have sinus beats but then goes back into atrial fib, rate in the low 100's. No new orders at this time.

## 2018-07-25 NOTE — PRE SEDATION
times daily (with meals)  Patient taking differently: Take 6.25 mg by mouth 2 times daily (with meals)  7/17/18  Yes Yumiko Clayton PA-C   famotidine (PEPCID) 20 MG tablet Take 1 tablet by mouth 2 times daily 7/17/18  Yes Yumiko Clayton PA-C   furosemide (LASIX) 20 MG tablet Take 1 tablet by mouth daily  Patient taking differently: Take 20 mg by mouth daily  7/17/18  Yes Yumiko Clayton PA-C   potassium chloride (KLOR-CON M) 20 MEQ extended release tablet Take 1 tablet by mouth daily 7/17/18  Yes Yumiko Clayton PA-C   warfarin (COUMADIN) 2.5 MG tablet Take 1 tablet by mouth every evening Dosed by [facility]. Patient taking differently: Take 2.5 mg by mouth every evening Dosed by [facility]. 3/6/18  Yes Sharon Chopra MD   NONFORMULARY Take 1 tablet by mouth 2 times daily Milk Thistle   Yes Historical Provider, MD   NONFORMULARY Take 1 capsule by mouth 2 times daily Equate Digestive care probiotic   Yes Historical Provider, MD   docusate sodium (COLACE) 250 MG capsule Take 250 mg by mouth nightly    Yes Historical Provider, MD   tamsulosin (FLOMAX) 0.4 MG capsule take 1 capsule by mouth once daily  Patient taking differently: take 1 capsule by mouth once nightly 2/5/18  Yes RAMIREZ Carrera CNP   insulin NPH (HUMULIN N;NOVOLIN N) 100 UNIT/ML injection vial Inject 40 Units into the skin 2 times daily Lunch and bedtime   Yes Historical Provider, MD   insulin aspart (NOVOLOG) 100 UNIT/ML injection vial Inject into the skin 4 times daily Glucose  6 units,  101-120 9 units,  121 - 150 12 units,  151-200 15 units,  201 - 250 18 units,  251 -300 24 units,  301-350 30 units, 351-400 36 units.    Yes Historical Provider, MD   gabapentin (NEURONTIN) 600 MG tablet Take 600 mg by mouth 3 times daily Indications: Pain    Yes Historical Provider, MD   finasteride (PROSCAR) 5 MG tablet   Take 5 mg by mouth daily Prostate 2/16/15  Yes Historical Provider, MD     Coumadin Use Last 7 Days:  yes -   Antiplatelet drug therapy use last 7 days: yes - ASA  Other anticoagulant use last 7 days: no  Additional Medication Information:  See med list      Pre-Sedation Documentation and Exam:   I have personally completed a history, physical exam & review of systems for this patient (see notes).     Mallampati Airway Assessment:  normal    Prior History of Anesthesia Complications:   none    ASA Classification:  Class 2 - A normal healthy patient with mild systemic disease    Sedation/ Anesthesia Plan:   intravenous sedation    Medications Planned:   etomidate intravenously    Patient is an appropriate candidate for plan of sedation: yes    Electronically signed by Arielle Hannon MD on 7/25/2018 at 1:20 PM

## 2018-07-25 NOTE — PROGRESS NOTES
Care taken over from cath lab, patient awake and alert. Pt remains in afib on the monitor. PTs BG 70 upon return to room. 4 oz OJ and PB crackers given. Pt also given menu. Post procedure care initiated. Will continue to monitor.

## 2018-07-25 NOTE — TELEPHONE ENCOUNTER
Mr. Bautista Jones called in this morning and stated that he in in Afib, he took his bp and it was 152/89 and pulse 105. It's getting to the point where he can just walk to the bathroom and back and that's it. He's having SOB and dizziness. Please advise. Thank you.

## 2018-07-25 NOTE — PROGRESS NOTES
Cardiovascular Surgery Progress Note      S/p 5-box thoracoscopic maze 7/12  Returns with fatigue and dyspnea, coincident with onset of atrial flutter yesterday  Felt very well beforehand  CXR shows scant effusions, otherwise unremarkable  EKG shows 3:1 flutter  Patient continues on amiodarone 200 bid  Plan [facility] today

## 2018-07-25 NOTE — OP NOTE
Cardioversion Procedure Note    Indication: atrial fibrillation    Consent: The patient provided verbal consent for this procedure. Pre-Medication: etomidate intravenously    Procedure: The patient was placed in the supine position and the chest area was exposed. The cardioversion pads were applied in the standard manner and configuration. Attempt #1: The defibrillator was set on the synchronous mode and charged to 200 joules. A charge was then delivered which resulted in no change in rhythm. Attempt #2: The defibrillator was set on the synchronous and charged to 150 joules. A charge was then delivered which resulted in  no change in rhythm. Attempt #3: Not necessary    The patient tolerated the procedure well. Complications: None    The patient would convert to sinus rhythm for only a few beats and then convert back to atrial fibrillation and 2:1 atrial tachycardia.

## 2018-07-25 NOTE — PROGRESS NOTES
Home-machelle given and discussed with patient and spouse. Both verbalized understanding of follow-up appointment with Dr Dima Ansari and activity instructions. Pt instructed not to drive for 24 hours and if feeling dizzy, lightheaded or like he could pass out, to report to ER. Pt verbalized understanding. Pt discharged home per wheelchair with spouse via central transport.

## 2018-07-25 NOTE — PROGRESS NOTES
Pt admitted to  2E03 ambulatory for heart cath. Pt NPO. Patient accompanied by daughter and wife. Vital signs obtained. Assessment and data collection initiated. Oriented to room. Policies and procedures for 2E explained   All questions answered with no further questions at this time. Fall prevention and safety precautions discussed with patient.

## 2018-07-27 ENCOUNTER — TELEPHONE (OUTPATIENT)
Dept: CARDIOTHORACIC SURGERY | Age: 68
End: 2018-07-27

## 2018-07-27 NOTE — TELEPHONE ENCOUNTER
Patient called and said that recently his was told to take 1/2 dosage of his BP meds. Today he says that his reading was 164/94. He wants to know if he can go back to taking a full dose? Also, patient states that he is feeling a whole lot better. Spoke to Lauren Kay NP and she verbally agreed  that it is ok for him to readjust his BP meds back to a full dose. Informed patient of this and he voiced understanding.

## 2018-07-30 RX ORDER — AMIODARONE HYDROCHLORIDE 200 MG/1
200 TABLET ORAL 2 TIMES DAILY
Qty: 60 TABLET | Refills: 3 | Status: SHIPPED | OUTPATIENT
Start: 2018-07-30 | End: 2018-08-22 | Stop reason: SDUPTHER

## 2018-07-31 ENCOUNTER — HOSPITAL ENCOUNTER (OUTPATIENT)
Dept: GENERAL RADIOLOGY | Age: 68
Discharge: HOME | End: 2018-07-31
Payer: MEDICARE

## 2018-07-31 ENCOUNTER — HOSPITAL ENCOUNTER (OUTPATIENT)
Age: 68
Discharge: HOME | End: 2018-07-31
Payer: MEDICARE

## 2018-07-31 DIAGNOSIS — Z98.890 HISTORY OF MAZE PROCEDURE: ICD-10-CM

## 2018-07-31 DIAGNOSIS — I48.91 ATRIAL FIBRILLATION, UNSPECIFIED TYPE (HCC): ICD-10-CM

## 2018-07-31 LAB
ANION GAP SERPL CALCULATED.3IONS-SCNC: 12 MEQ/L (ref 8–16)
BUN BLDV-MCNC: 17 MG/DL (ref 7–22)
CALCIUM SERPL-MCNC: 9.6 MG/DL (ref 8.5–10.5)
CHLORIDE BLD-SCNC: 100 MEQ/L (ref 98–111)
CO2: 28 MEQ/L (ref 23–33)
CREAT SERPL-MCNC: 1.1 MG/DL (ref 0.4–1.2)
GFR SERPL CREATININE-BSD FRML MDRD: 66 ML/MIN/1.73M2
GLUCOSE BLD-MCNC: 187 MG/DL (ref 70–108)
POTASSIUM SERPL-SCNC: 5 MEQ/L (ref 3.5–5.2)
SODIUM BLD-SCNC: 140 MEQ/L (ref 135–145)

## 2018-07-31 PROCEDURE — 71046 X-RAY EXAM CHEST 2 VIEWS: CPT

## 2018-07-31 PROCEDURE — 80048 BASIC METABOLIC PNL TOTAL CA: CPT

## 2018-07-31 PROCEDURE — 36415 COLL VENOUS BLD VENIPUNCTURE: CPT

## 2018-08-08 ENCOUNTER — TELEPHONE (OUTPATIENT)
Dept: CARDIOTHORACIC SURGERY | Age: 68
End: 2018-08-08

## 2018-08-08 ENCOUNTER — HOSPITAL ENCOUNTER (OUTPATIENT)
Dept: PHARMACY | Age: 68
Setting detail: THERAPIES SERIES
Discharge: HOME | End: 2018-08-08
Payer: MEDICARE

## 2018-08-08 LAB — POC INR: 3.2 (ref 0.8–1.2)

## 2018-08-08 PROCEDURE — 85610 PROTHROMBIN TIME: CPT

## 2018-08-08 PROCEDURE — 99211 OFF/OP EST MAY X REQ PHY/QHP: CPT

## 2018-08-08 PROCEDURE — 36416 COLLJ CAPILLARY BLOOD SPEC: CPT

## 2018-08-08 NOTE — PROGRESS NOTES
Medication Management [facility]  Anticoagulation Clinic  [phone] (phone)  [phone] (fax)      Mr. Samara Del Real is a 70 y.o.  male with history of A. Fib who presents today for anticoagulation monitoring and adjustment. Patient verifies current dosing regimen and tablet strength. No extra doses. Patient reports that he may have missed a dose, since he took his morning medicine twice in 1 day. Patient states this was probably at the end of July. Patient uses pill box at home,but reports its hard to keep changes straight even with the pill box. Patient denies s/s bleeding/bruising/swelling/chest pain. SOB is prominent since A. Fib has not been controlled well since ablation. No blood in urine or stool. Not eating as much as he has used to. No changes in OTC agents/Herbals. Coreg 6.25 mg was changed to BID but on 7/21 PCP told to patient to add 0.5 tablets to this current dose. Patient stopped this increase on 8/6 himself since his BP was too low. Furosemide is 2 20 mg tablets daily for 40 mg daily. No change in alcohol use or tobacco use. Not able to be activity due to constant fatigue. Reports being extremely fatigued from shopping for groceries, went to lay down, and reports getting up for a late night snack and then falling when going to sit down to eat it. Patient denies headaches/dizziness/lightheadedness. Patient reports falling last night. Thinks A. Fib may have been the cause. Denies hitting head and losing consciousness. No vomiting/diarrhea or acute illness. No Procedures scheduled in the future at this time. Patient is seeing Dr. Caron Schuster on 8/15/18    Assessment:   Lab Results   Component Value Date    INR 3.20 (H) 08/08/2018    INR 3.10 (H) 07/23/2018    INR 1.37 (H) 07/12/2018     INR supratherapeutic   Recent Labs      08/08/18   1321   INR  3.20*         Plan:  Coumadin 1.25 mg x1, then decrease Coumadin 1.25 mg MF, 2.5 mg TWThSS.   Recheck INR 2 weeks. Patient reminded to call the Anticoagulation Clinic with signs or symptoms of bleeding or with any medication changes. Patient given instructions utilizing the teach back method. Discharged ambulatory in no apparent distress. After visit summary printed and reviewed with patient.       Medications reviewed and updated on home medication list Yes    Influenza vaccine:     [] given    [x] declined   [] received previously   [x] plans to receive at a later time   [] refused    [x] documented in [facility], PharmD, BCPS  8/8/2018 1:56 PM

## 2018-08-09 ENCOUNTER — TELEPHONE (OUTPATIENT)
Dept: CARDIOTHORACIC SURGERY | Age: 68
End: 2018-08-09

## 2018-08-14 ENCOUNTER — HOSPITAL ENCOUNTER (INPATIENT)
Age: 68
LOS: 1 days | Discharge: HOME | DRG: 186 | End: 2018-08-15
Attending: EMERGENCY MEDICINE | Admitting: INTERNAL MEDICINE
Payer: MEDICARE

## 2018-08-14 ENCOUNTER — APPOINTMENT (OUTPATIENT)
Dept: ULTRASOUND IMAGING | Age: 68
DRG: 186 | End: 2018-08-14
Payer: MEDICARE

## 2018-08-14 ENCOUNTER — APPOINTMENT (OUTPATIENT)
Dept: CT IMAGING | Age: 68
DRG: 186 | End: 2018-08-14
Payer: MEDICARE

## 2018-08-14 ENCOUNTER — APPOINTMENT (OUTPATIENT)
Dept: GENERAL RADIOLOGY | Age: 68
DRG: 186 | End: 2018-08-14
Payer: MEDICARE

## 2018-08-14 DIAGNOSIS — J90 PLEURAL EFFUSION ON RIGHT: Primary | ICD-10-CM

## 2018-08-14 LAB
ALBUMIN SERPL-MCNC: 3.4 G/DL (ref 3.5–5.1)
ALP BLD-CCNC: 91 U/L (ref 38–126)
ALT SERPL-CCNC: 7 U/L (ref 11–66)
AMYLASE FLUID: 24 U/L
ANION GAP SERPL CALCULATED.3IONS-SCNC: 14 MEQ/L (ref 8–16)
AST SERPL-CCNC: 14 U/L (ref 5–40)
BASOPHILS # BLD: 0.4 %
BASOPHILS ABSOLUTE: 0 THOU/MM3 (ref 0–0.1)
BILIRUB SERPL-MCNC: 0.6 MG/DL (ref 0.3–1.2)
BILIRUBIN DIRECT: < 0.2 MG/DL (ref 0–0.3)
BUN BLDV-MCNC: 13 MG/DL (ref 7–22)
CALCIUM SERPL-MCNC: 9.4 MG/DL (ref 8.5–10.5)
CHLORIDE BLD-SCNC: 100 MEQ/L (ref 98–111)
CO2: 28 MEQ/L (ref 23–33)
CREAT SERPL-MCNC: 1 MG/DL (ref 0.4–1.2)
EKG ATRIAL RATE: 73 BPM
EKG P AXIS: 59 DEGREES
EKG P-R INTERVAL: 370 MS
EKG Q-T INTERVAL: 430 MS
EKG QRS DURATION: 90 MS
EKG QTC CALCULATION (BAZETT): 473 MS
EKG R AXIS: 43 DEGREES
EKG T AXIS: 33 DEGREES
EKG VENTRICULAR RATE: 73 BPM
EOSINOPHIL # BLD: 4.4 %
EOSINOPHILS ABSOLUTE: 0.4 THOU/MM3 (ref 0–0.4)
ERYTHROCYTE [DISTWIDTH] IN BLOOD BY AUTOMATED COUNT: 13.2 % (ref 11.5–14.5)
ERYTHROCYTE [DISTWIDTH] IN BLOOD BY AUTOMATED COUNT: 42.6 FL (ref 35–45)
GFR SERPL CREATININE-BSD FRML MDRD: 74 ML/MIN/1.73M2
GLUCOSE BLD-MCNC: 156 MG/DL (ref 70–108)
GLUCOSE BLD-MCNC: 161 MG/DL (ref 70–108)
GLUCOSE BLD-MCNC: 89 MG/DL (ref 70–108)
GLUCOSE, FLUID: 112 MG/DL
HCT VFR BLD CALC: 39.6 % (ref 42–52)
HEMOGLOBIN: 12.8 GM/DL (ref 14–18)
IMMATURE GRANS (ABS): 0.05 THOU/MM3 (ref 0–0.07)
IMMATURE GRANULOCYTES: 0.5 %
INR BLD: 3.62 (ref 0.85–1.13)
LD, FLUID: 163 U/L
LD: 172 U/L (ref 100–190)
LYMPHOCYTES # BLD: 22.6 %
LYMPHOCYTES ABSOLUTE: 2.1 THOU/MM3 (ref 1–4.8)
MCH RBC QN AUTO: 28.5 PG (ref 26–33)
MCHC RBC AUTO-ENTMCNC: 32.3 GM/DL (ref 32.2–35.5)
MCV RBC AUTO: 88.2 FL (ref 80–94)
MONOCYTES # BLD: 9.8 %
MONOCYTES ABSOLUTE: 0.9 THOU/MM3 (ref 0.4–1.3)
NUCLEATED RED BLOOD CELLS: 0 /100 WBC
OSMOLALITY CALCULATION: 282.7 MOSMOL/KG (ref 275–300)
PH FLUID: 7.47
PLATELET # BLD: 280 THOU/MM3 (ref 130–400)
PMV BLD AUTO: 10.6 FL (ref 9.4–12.4)
POTASSIUM SERPL-SCNC: 3.8 MEQ/L (ref 3.5–5.2)
PRO-BNP: 942 PG/ML (ref 0–900)
PROTEIN FLUID: 4.5 GM/DL
RBC # BLD: 4.49 MILL/MM3 (ref 4.7–6.1)
SEG NEUTROPHILS: 62.3 %
SEGMENTED NEUTROPHILS ABSOLUTE COUNT: 5.9 THOU/MM3 (ref 1.8–7.7)
SODIUM BLD-SCNC: 142 MEQ/L (ref 135–145)
TOTAL PROTEIN: 7.8 G/DL (ref 6.1–8)
TOTAL PROTEIN: 7.8 G/DL (ref 6.1–8)
TROPONIN T: 0.04 NG/ML
TROPONIN T: 0.04 NG/ML
WBC # BLD: 9.5 THOU/MM3 (ref 4.8–10.8)

## 2018-08-14 PROCEDURE — 71275 CT ANGIOGRAPHY CHEST: CPT

## 2018-08-14 PROCEDURE — 2580000003 HC RX 258: Performed by: INTERNAL MEDICINE

## 2018-08-14 PROCEDURE — 99221 1ST HOSP IP/OBS SF/LOW 40: CPT | Performed by: INTERNAL MEDICINE

## 2018-08-14 PROCEDURE — 87205 SMEAR GRAM STAIN: CPT

## 2018-08-14 PROCEDURE — 93005 ELECTROCARDIOGRAM TRACING: CPT | Performed by: EMERGENCY MEDICINE

## 2018-08-14 PROCEDURE — 99222 1ST HOSP IP/OBS MODERATE 55: CPT | Performed by: INTERNAL MEDICINE

## 2018-08-14 PROCEDURE — 0W993ZZ DRAINAGE OF RIGHT PLEURAL CAVITY, PERCUTANEOUS APPROACH: ICD-10-PCS | Performed by: RADIOLOGY

## 2018-08-14 PROCEDURE — 2709999900 HC NON-CHARGEABLE SUPPLY

## 2018-08-14 PROCEDURE — 6370000000 HC RX 637 (ALT 250 FOR IP): Performed by: INTERNAL MEDICINE

## 2018-08-14 PROCEDURE — 85610 PROTHROMBIN TIME: CPT

## 2018-08-14 PROCEDURE — A4614 HAND-HELD PEFR METER: HCPCS

## 2018-08-14 PROCEDURE — 84484 ASSAY OF TROPONIN QUANT: CPT

## 2018-08-14 PROCEDURE — A6250 SKIN SEAL PROTECT MOISTURIZR: HCPCS

## 2018-08-14 PROCEDURE — 83615 LACTATE (LD) (LDH) ENZYME: CPT

## 2018-08-14 PROCEDURE — 6360000004 HC RX CONTRAST MEDICATION: Performed by: EMERGENCY MEDICINE

## 2018-08-14 PROCEDURE — 32555 ASPIRATE PLEURA W/ IMAGING: CPT

## 2018-08-14 PROCEDURE — 82248 BILIRUBIN DIRECT: CPT

## 2018-08-14 PROCEDURE — 89050 BODY FLUID CELL COUNT: CPT

## 2018-08-14 PROCEDURE — 88112 CYTOPATH CELL ENHANCE TECH: CPT

## 2018-08-14 PROCEDURE — 85025 COMPLETE CBC W/AUTO DIFF WBC: CPT

## 2018-08-14 PROCEDURE — 83880 ASSAY OF NATRIURETIC PEPTIDE: CPT

## 2018-08-14 PROCEDURE — 87075 CULTR BACTERIA EXCEPT BLOOD: CPT

## 2018-08-14 PROCEDURE — 1200000003 HC TELEMETRY R&B

## 2018-08-14 PROCEDURE — 88305 TISSUE EXAM BY PATHOLOGIST: CPT

## 2018-08-14 PROCEDURE — 80053 COMPREHEN METABOLIC PANEL: CPT

## 2018-08-14 PROCEDURE — 71045 X-RAY EXAM CHEST 1 VIEW: CPT

## 2018-08-14 PROCEDURE — 82945 GLUCOSE OTHER FLUID: CPT

## 2018-08-14 PROCEDURE — 82948 REAGENT STRIP/BLOOD GLUCOSE: CPT

## 2018-08-14 PROCEDURE — 99285 EMERGENCY DEPT VISIT HI MDM: CPT

## 2018-08-14 PROCEDURE — 83986 ASSAY PH BODY FLUID NOS: CPT

## 2018-08-14 PROCEDURE — 84157 ASSAY OF PROTEIN OTHER: CPT

## 2018-08-14 PROCEDURE — 82150 ASSAY OF AMYLASE: CPT

## 2018-08-14 PROCEDURE — 84155 ASSAY OF PROTEIN SERUM: CPT

## 2018-08-14 PROCEDURE — 93010 ELECTROCARDIOGRAM REPORT: CPT | Performed by: NUCLEAR MEDICINE

## 2018-08-14 PROCEDURE — 87070 CULTURE OTHR SPECIMN AEROBIC: CPT

## 2018-08-14 PROCEDURE — 36415 COLL VENOUS BLD VENIPUNCTURE: CPT

## 2018-08-14 RX ORDER — TAMSULOSIN HYDROCHLORIDE 0.4 MG/1
0.4 CAPSULE ORAL EVERY EVENING
Status: DISCONTINUED | OUTPATIENT
Start: 2018-08-14 | End: 2018-08-15 | Stop reason: HOSPADM

## 2018-08-14 RX ORDER — DEXTROSE MONOHYDRATE 50 MG/ML
100 INJECTION, SOLUTION INTRAVENOUS PRN
Status: DISCONTINUED | OUTPATIENT
Start: 2018-08-14 | End: 2018-08-15 | Stop reason: HOSPADM

## 2018-08-14 RX ORDER — DOCUSATE SODIUM 100 MG/1
200 CAPSULE, LIQUID FILLED ORAL NIGHTLY
Status: DISCONTINUED | OUTPATIENT
Start: 2018-08-14 | End: 2018-08-15 | Stop reason: HOSPADM

## 2018-08-14 RX ORDER — ONDANSETRON 4 MG/1
4 TABLET, ORALLY DISINTEGRATING ORAL EVERY 6 HOURS PRN
Status: DISCONTINUED | OUTPATIENT
Start: 2018-08-14 | End: 2018-08-15 | Stop reason: HOSPADM

## 2018-08-14 RX ORDER — GABAPENTIN 600 MG/1
600 TABLET ORAL 3 TIMES DAILY
Status: DISCONTINUED | OUTPATIENT
Start: 2018-08-14 | End: 2018-08-15 | Stop reason: HOSPADM

## 2018-08-14 RX ORDER — NICOTINE POLACRILEX 4 MG
15 LOZENGE BUCCAL PRN
Status: DISCONTINUED | OUTPATIENT
Start: 2018-08-14 | End: 2018-08-15 | Stop reason: HOSPADM

## 2018-08-14 RX ORDER — FAMOTIDINE 20 MG/1
20 TABLET, FILM COATED ORAL 2 TIMES DAILY
Status: DISCONTINUED | OUTPATIENT
Start: 2018-08-14 | End: 2018-08-15 | Stop reason: HOSPADM

## 2018-08-14 RX ORDER — POTASSIUM CHLORIDE 7.45 MG/ML
10 INJECTION INTRAVENOUS PRN
Status: DISCONTINUED | OUTPATIENT
Start: 2018-08-14 | End: 2018-08-15 | Stop reason: HOSPADM

## 2018-08-14 RX ORDER — FUROSEMIDE 20 MG/1
20 TABLET ORAL DAILY
Status: DISCONTINUED | OUTPATIENT
Start: 2018-08-14 | End: 2018-08-15 | Stop reason: HOSPADM

## 2018-08-14 RX ORDER — AMIODARONE HYDROCHLORIDE 200 MG/1
200 TABLET ORAL 2 TIMES DAILY
Status: DISCONTINUED | OUTPATIENT
Start: 2018-08-14 | End: 2018-08-15 | Stop reason: HOSPADM

## 2018-08-14 RX ORDER — PHYTONADIONE 5 MG/1
10 TABLET ORAL ONCE
Status: COMPLETED | OUTPATIENT
Start: 2018-08-14 | End: 2018-08-14

## 2018-08-14 RX ORDER — ATORVASTATIN CALCIUM 20 MG/1
20 TABLET, FILM COATED ORAL NIGHTLY
Status: DISCONTINUED | OUTPATIENT
Start: 2018-08-14 | End: 2018-08-15 | Stop reason: HOSPADM

## 2018-08-14 RX ORDER — FINASTERIDE 5 MG/1
5 TABLET, FILM COATED ORAL DAILY
Status: DISCONTINUED | OUTPATIENT
Start: 2018-08-14 | End: 2018-08-15 | Stop reason: HOSPADM

## 2018-08-14 RX ORDER — DEXTROSE MONOHYDRATE 25 G/50ML
12.5 INJECTION, SOLUTION INTRAVENOUS PRN
Status: DISCONTINUED | OUTPATIENT
Start: 2018-08-14 | End: 2018-08-15 | Stop reason: HOSPADM

## 2018-08-14 RX ORDER — CARVEDILOL 6.25 MG/1
6.25 TABLET ORAL 2 TIMES DAILY WITH MEALS
Status: DISCONTINUED | OUTPATIENT
Start: 2018-08-14 | End: 2018-08-15 | Stop reason: HOSPADM

## 2018-08-14 RX ORDER — POTASSIUM CHLORIDE 20MEQ/15ML
40 LIQUID (ML) ORAL PRN
Status: DISCONTINUED | OUTPATIENT
Start: 2018-08-14 | End: 2018-08-15 | Stop reason: HOSPADM

## 2018-08-14 RX ORDER — POTASSIUM CHLORIDE 20 MEQ/1
40 TABLET, EXTENDED RELEASE ORAL PRN
Status: DISCONTINUED | OUTPATIENT
Start: 2018-08-14 | End: 2018-08-15 | Stop reason: HOSPADM

## 2018-08-14 RX ORDER — SODIUM CHLORIDE 0.9 % (FLUSH) 0.9 %
10 SYRINGE (ML) INJECTION EVERY 12 HOURS SCHEDULED
Status: DISCONTINUED | OUTPATIENT
Start: 2018-08-14 | End: 2018-08-15 | Stop reason: HOSPADM

## 2018-08-14 RX ORDER — ONDANSETRON 2 MG/ML
4 INJECTION INTRAMUSCULAR; INTRAVENOUS EVERY 6 HOURS PRN
Status: DISCONTINUED | OUTPATIENT
Start: 2018-08-14 | End: 2018-08-14 | Stop reason: CLARIF

## 2018-08-14 RX ORDER — SODIUM CHLORIDE 0.9 % (FLUSH) 0.9 %
10 SYRINGE (ML) INJECTION PRN
Status: DISCONTINUED | OUTPATIENT
Start: 2018-08-14 | End: 2018-08-15 | Stop reason: HOSPADM

## 2018-08-14 RX ADMIN — FAMOTIDINE 20 MG: 20 TABLET ORAL at 20:28

## 2018-08-14 RX ADMIN — INSULIN HUMAN 35 UNITS: 100 INJECTION, SUSPENSION SUBCUTANEOUS at 20:29

## 2018-08-14 RX ADMIN — INSULIN LISPRO 2 UNITS: 100 INJECTION, SOLUTION INTRAVENOUS; SUBCUTANEOUS at 20:29

## 2018-08-14 RX ADMIN — AMIODARONE HYDROCHLORIDE 200 MG: 200 TABLET ORAL at 20:28

## 2018-08-14 RX ADMIN — DOCUSATE SODIUM 200 MG: 100 CAPSULE, LIQUID FILLED ORAL at 20:28

## 2018-08-14 RX ADMIN — CARVEDILOL 6.25 MG: 6.25 TABLET, FILM COATED ORAL at 17:29

## 2018-08-14 RX ADMIN — TAMSULOSIN HYDROCHLORIDE 0.4 MG: 0.4 CAPSULE ORAL at 17:28

## 2018-08-14 RX ADMIN — GABAPENTIN 600 MG: 600 TABLET, FILM COATED ORAL at 20:28

## 2018-08-14 RX ADMIN — PHYTONADIONE 10 MG: 5 TABLET ORAL at 14:54

## 2018-08-14 RX ADMIN — IOPAMIDOL 85 ML: 755 INJECTION, SOLUTION INTRAVENOUS at 10:47

## 2018-08-14 RX ADMIN — FUROSEMIDE 20 MG: 20 TABLET ORAL at 17:28

## 2018-08-14 RX ADMIN — ATORVASTATIN CALCIUM 20 MG: 20 TABLET, FILM COATED ORAL at 20:28

## 2018-08-14 RX ADMIN — Medication 2 UNITS: at 17:29

## 2018-08-14 RX ADMIN — Medication 10 ML: at 20:28

## 2018-08-14 ASSESSMENT — PAIN SCALES - GENERAL
PAINLEVEL_OUTOF10: 0
PAINLEVEL_OUTOF10: 3
PAINLEVEL_OUTOF10: 0
PAINLEVEL_OUTOF10: 0

## 2018-08-14 ASSESSMENT — PAIN DESCRIPTION - PAIN TYPE: TYPE: ACUTE PAIN

## 2018-08-14 ASSESSMENT — PAIN DESCRIPTION - LOCATION: LOCATION: CHEST

## 2018-08-14 ASSESSMENT — PAIN DESCRIPTION - DESCRIPTORS: DESCRIPTORS: DISCOMFORT

## 2018-08-14 NOTE — CONSULTS
mellitus ([facility] [address]     History of esophagogastroduodenoscopy (EGD) 6/7/16    History of kidney stones     Hyperlipidemia     Hypertension       Past Surgical History        Procedure Laterality Date    BACK SURGERY  05/2017    CARDIAC CATHETERIZATION  1995??    no stents    CHOLECYSTECTOMY  1984??    COLONOSCOPY      last one 2015??    CYSTOSCOPY  05/02/2017    KIDNEY STONE SURGERY Left 05/02/2017    basket retrievel   [facility]? ??    arthroscopy, left    LITHOTRIPSY  1990?? -  2016??     kidney stones    OTHER SURGICAL HISTORY  5/26/2016    LEFT ESWL    NV OFFICE/OUTPT VISIT,PROCEDURE ONLY N/A 7/12/2018    5--BOX THORASCOPIC MAZE WITH IGGY performed by Hillman Carrel, MD at [facility]  2000??    right    SKIN BIOPSY      URETEROSCOPY Left 05/02/2017     Meds    Current Medications    amiodarone  200 mg Oral BID    atorvastatin  20 mg Oral Nightly    carvedilol  6.25 mg Oral BID WC    famotidine  20 mg Oral BID    docusate sodium  200 mg Oral Nightly    finasteride  5 mg Oral Daily    furosemide  20 mg Oral Daily    gabapentin  600 mg Oral TID    insulin NPH  35 Units Subcutaneous BID AC    tamsulosin  0.4 mg Oral QPM    sodium chloride flush  10 mL Intravenous 2 times per day    insulin lispro  0-12 Units Subcutaneous TID WC    insulin lispro  0-6 Units Subcutaneous Nightly     sodium chloride flush, potassium chloride **OR** potassium chloride **OR** potassium chloride, glucose, dextrose, glucagon (rDNA), dextrose, ondansetron  IV Drips/Infusions   dextrose       Home Medications  Prescriptions Prior to Admission: amiodarone (CORDARONE) 200 MG tablet, Take 1 tablet by mouth 2 times daily  aspirin 325 MG EC tablet, Take 1 tablet by mouth daily  atorvastatin (LIPITOR) 20 MG tablet, Take 1 tablet by mouth nightly  carvedilol (COREG) 6.25 MG tablet, Take 1 tablet by mouth 2 times daily (with meals) (Patient taking differently: Take 6.25 mg by mouth 2 times daily (with meals) )  famotidine (PEPCID) 20 MG tablet, Take 1 tablet by mouth 2 times daily  furosemide (LASIX) 20 MG tablet, Take 1 tablet by mouth daily (Patient taking differently: Take 20 mg by mouth daily )  potassium chloride (KLOR-CON M) 20 MEQ extended release tablet, Take 1 tablet by mouth daily  warfarin (COUMADIN) 2.5 MG tablet, Take 1 tablet by mouth every evening Dosed by [facility]. (Patient taking differently: Take 2.5 mg by mouth every evening Dosed by [facility]. )  NONFORMULARY, Take 1 tablet by mouth 2 times daily Milk Thistle  NONFORMULARY, Take 1 capsule by mouth 2 times daily Equate Digestive care probiotic  docusate sodium (COLACE) 250 MG capsule, Take 250 mg by mouth nightly   tamsulosin (FLOMAX) 0.4 MG capsule, take 1 capsule by mouth once daily (Patient taking differently: take 1 capsule by mouth once nightly)  insulin NPH (HUMULIN N;NOVOLIN N) 100 UNIT/ML injection vial, Inject 40 Units into the skin 2 times daily Lunch and bedtime  insulin aspart (NOVOLOG) 100 UNIT/ML injection vial, Inject into the skin 4 times daily Glucose  6 units,  101-120 9 units,  121 - 150 12 units,  151-200 15 units,  201 - 250 18 units,  251 -300 24 units,  301-350 30 units, 351-400 36 units. gabapentin (NEURONTIN) 600 MG tablet, Take 600 mg by mouth 3 times daily Indications: Pain   finasteride (PROSCAR) 5 MG tablet,  Take 5 mg by mouth daily Prostate  Diet    DIET CARB CONTROL;   Allergies    Sulfa antibiotics  Family History      Family History   Problem Relation Age of Onset    Arthritis Mother     Diabetes Mother     Heart Disease Mother     High Blood Pressure Mother     Stroke Mother     Diabetes Father     High Blood Pressure Father      Sleep History    Never diagnosed with sleep apnea in the past    Social History     Social History     Social History    Marital status:      Spouse name: N/A    Number of children: N/A    Years of education: N/A Occupational History    Not on file. Social History Main Topics    Smoking status: Never Smoker    Smokeless tobacco: Never Used    Alcohol use No    Drug use: No    Sexual activity: Yes     Partners: Female     Other Topics Concern    Not on file     Social History Narrative    No narrative on file       Riview of systems   General/Constitutional: No recent loss of weight or appetite changes. No fever or chills. HENT: Negative. Eyes: Negative. Upper respiratory tract: Rhinorrhea with no nasal stuffiness with no post nasal drip. Lower respiratory tract/ lungs: Worsening of shortness of breath. No hemoptysis. Cardiovascular: No palpitations or chest pain. Gastrointestinal: No nausea or vomiting. Neurological: No focal neurologiacal weakness. Extremities: No edema. Musculoskeletal: No complaints. Genitourinary: No complaints. Hematological: Negative. Psychiatric/Behavioral: Negative. Skin: No itching. Vitals     height is 6' (1.829 m) and weight is 302 lb (137 kg) (abnormal). His oral temperature is 97.8 °F (36.6 °C). His blood pressure is 188/90 (abnormal) and his pulse is 77. His respiration is 20 and oxygen saturation is 95%. Body mass index is 40.96 kg/m². SUPPLEMENTAL O2: O2 Flow Rate (L/min): 2 L/min       I/O    No intake or output data in the 24 hours ending 08/14/18 1824  No intake/output data recorded. Patient Vitals for the past 96 hrs (Last 3 readings):   Weight   08/14/18 0952 (!) 302 lb (137 kg)       Exam   General Appearance: moderately built, moderately nourished in no acute distress on O2 via nasal cannula at 2Lpm  HEENT: Normal, Head is normocephalic, atraumatic. Oropharynx is clear and moist.  No oral thrush. PERRL  Neck - Supple, No JVD present. No tracheal deviation. Lungs - Bilateral air entry present.  Decreased breath sounds on  right side of chest. Dullness to purcussion  Cardiovascular - Heart sounds are normal.  Regular rhythm normal rate without murmur, gallop or rub. Abdomen - Soft, nontender, nondistended, no masses or organomegaly  Neurologic - Awake, alert, oriented. There are no focal motor or sensory deficits  Extremities - No cyanosis, clubbing or edema. Musculoskeletal: Normal range of motion. Patient exhibits no tenderness. Lymphadenopathy:  No cervical adenopathy. Psychiatric: Patient  has a normal mood and affect. Skin - No bruising or bleeding. Labs  - Old records and notes have been reviewed in [facility]   ABG  Lab Results   Component Value Date    PH 7.34 07/12/2018    PO2 98 07/12/2018    PCO2 42 07/12/2018    HCO3 23 07/12/2018    O2SAT 97 07/12/2018     Lab Results   Component Value Date    IFIO2 45 07/12/2018    MODE CPAP/PS 07/12/2018    SETTIDVOL 590 07/12/2018    SETPEEP 5.0 07/12/2018     CBC  Recent Labs      08/14/18   0950   WBC  9.5   RBC  4.49*   HGB  12.8*   HCT  39.6*   MCV  88.2   MCH  28.5   MCHC  32.3   PLT  280   MPV  10.6      BMP  Recent Labs      08/14/18   0950   NA  142   K  3.8   CL  100   CO2  28   BUN  13   CREATININE  1.0   GLUCOSE  89   CALCIUM  9.4     LFT  Recent Labs      08/14/18   0950   AST  14   ALT  7*   BILITOT  0.6   ALKPHOS  91     TROP  Lab Results   Component Value Date    TROPONINT 0.039 08/14/2018    TROPONINT 0.045 08/14/2018     BNP  No results for input(s): BNP in the last 72 hours. Lactic Acid  No results for input(s): LACTA in the last 72 hours. INR  Recent Labs      08/14/18   0950   INR  3.62*     PTT  No results for input(s): APTT in the last 72 hours. Glucose  Recent Labs      08/14/18   1725   POCGLU  156*     UA   Recent Labs      08/14/18   [facility]   . PFTs   None in [facility]    Sleep studies   None in Epic    Cultures    None    Echocardiogram   1/15/2018  Transesophageal Echocardiography Report (IGGY)  Conclusions      Summary   IGGY was carried out after adequate sedation.  The probe was inserted with   ease and manipulated to obtain selected views in 2D, M-Mode, LDH, triglycerides, Amylase, cell count, Cytology with cell block, Gram stain and cultures, AFB smear and cultures, Fungal cultures. Will also send simultaneous serum total protein and LDH for comparison.  -Titrate Oxygen to keep Spo2 >90%. -Will hold anticoagulation for planned procedure.  -Will send necessary pre thoracentesis labs per interventional radiology protocol. \"Thank you for asking us to see this patient\"     Case discussed with nurse and patient. Huber Garciaereducated about my impression and plan. He verbalizes understanding. Questions and concerns addressed.     Electronically signed by   Kavitha Valdez MD on 8/14/2018 at 6:24 PM

## 2018-08-14 NOTE — ED TRIAGE NOTES
Pt in bed. Pt stated that around 0800 this morning he started having shortness of breath. PT stated that he is always short of breath, but not to this extreme. PT stated that he was just sitting in a chair this morning when the SOB started. PT in no distress. Wife at bedside.

## 2018-08-14 NOTE — CARE COORDINATION
Brief ED Social Work Assessment  8/14/18, 12:12 PM    Spoke with patient and family at the bedside. Patient stated that he as struggled sine his procedure at home. Stated that he uses a cane to get around. Patient is hoping that getting his breathing better will help him feel better.

## 2018-08-14 NOTE — PROGRESS NOTES
Patient admitted to [address] [place] [address] room 6. Patient oriented to room and call light. Family present at bedside. Patient on RA, then stated he was SOB and requested to wear O2. INT in left AC. No complaints of pain.

## 2018-08-14 NOTE — ED PROVIDER NOTES
[facility]  eMERGENCY dEPARTMENT eNCOUnter          CHIEF COMPLAINT       Chief Complaint   Patient presents with    Shortness of Breath       Nurses Notes reviewed and I agree except as noted in the HPI. HISTORY OF PRESENT ILLNESS    Roopa Franz is a 70 y.o. male who presents to the emergency department for the evaluation shortness of breath that began this morning. The patient has a history of A-fib, hypertension, cardiac ablation, and diabetes. Patient also developed some chest pressure and palpitations with his shortness of breath. He is on coumadin and he is allergic to sulfa drugs. REVIEW OF SYSTEMS     There is been no fever, no sputum, he has some chest pain bilaterally. Does not associated with exertion. His dyspnea does seem to be worse with lying flat. This been no vomiting or abdominal pain. Remainder of review of systems is otherwise reviewed as negative. PAST MEDICAL HISTORY    has a past medical history of Arthritis; Atrial fibrillation ([address] Cancer ([facility] [address] Chronic kidney disease; Diabetes mellitus ([address] History of esophagogastroduodenoscopy (EGD); History of kidney stones; Hyperlipidemia; and Hypertension. SURGICAL HISTORY      has a past surgical history that includes knee surgery (1988???); Rotator cuff repair (2000?? ); Lithotripsy (1990?? -  2016??); other surgical history (5/26/2016); Cardiac catheterization (1995??); Cholecystectomy (1984??); Colonoscopy; skin biopsy; Cystoscopy (05/02/2017); Ureteroscopy (Left, 05/02/2017); Kidney stone surgery (Left, 05/02/2017); back surgery (05/2017); and pr office/outpt visit,procedure only (N/A, 7/12/2018).     CURRENT MEDICATIONS       Current Discharge Medication List      CONTINUE these medications which have NOT CHANGED    Details   amiodarone (CORDARONE) 200 MG tablet Take 1 tablet by mouth 2 times daily  Qty: 60 tablet, Refills: 3      aspirin 325 MG EC tablet Take 1 tablet by mouth daily  Qty: 30 tablet, Refills: 3      atorvastatin (LIPITOR) 20 MG tablet Take 1 tablet by mouth nightly  Qty: 30 tablet, Refills: 3      carvedilol (COREG) 6.25 MG tablet Take 1 tablet by mouth 2 times daily (with meals)  Qty: 60 tablet, Refills: 3      famotidine (PEPCID) 20 MG tablet Take 1 tablet by mouth 2 times daily  Qty: 60 tablet, Refills: 3      furosemide (LASIX) 20 MG tablet Take 1 tablet by mouth daily  Qty: 60 tablet, Refills: 3      potassium chloride (KLOR-CON M) 20 MEQ extended release tablet Take 1 tablet by mouth daily  Qty: 60 tablet, Refills: 3      warfarin (COUMADIN) 2.5 MG tablet Take 1 tablet by mouth every evening Dosed by [address] Qty: 90 tablet, Refills: 3      !! NONFORMULARY Take 1 tablet by mouth 2 times daily Milk Thistle      !! NONFORMULARY Take 1 capsule by mouth 2 times daily Equate Digestive care probiotic      docusate sodium (COLACE) 250 MG capsule Take 250 mg by mouth nightly       tamsulosin (FLOMAX) 0.4 MG capsule take 1 capsule by mouth once daily  Qty: 90 capsule, Refills: 3    Associated Diagnoses: Difficulty urinating      insulin NPH (HUMULIN N;NOVOLIN N) 100 UNIT/ML injection vial Inject 40 Units into the skin 2 times daily Lunch and bedtime      insulin aspart (NOVOLOG) 100 UNIT/ML injection vial Inject into the skin 4 times daily Glucose  6 units,  101-120 9 units,  121 - 150 12 units,  151-200 15 units,  201 - 250 18 units,  251 -300 24 units,  301-350 30 units, 351-400 36 units. gabapentin (NEURONTIN) 600 MG tablet Take 600 mg by mouth 3 times daily Indications: Pain       finasteride (PROSCAR) 5 MG tablet   Take 5 mg by mouth daily Prostate       !! - Potential duplicate medications found. Please discuss with provider. ALLERGIES     is allergic to sulfa antibiotics. FAMILY HISTORY     indicated that his mother is . He indicated that his father is .     family history includes Arthritis in his mother; Diabetes in his separately reportable procedures. CONSULTS:  hospitalist    PROCEDURES:  None    FINAL IMPRESSION      1. Pleural effusion on right          DISPOSITION/PLAN   Admitted    DISCHARGE MEDICATIONS:  Current Discharge Medication List          (Please note that portions of this note were completed with a voice recognition program.  Efforts were made to edit the dictations but occasionally words are mis-transcribed.)    Maria Luz Miller DO    Scribe:  Catia Muñoz 8/14/18 10:09 AM Scribing for and in the presence of Maria Luz Miller DO.    [unfilled]    Provider:  I personally performed the services described in the documentation, reviewed and edited the documentation which was dictated to the scribe in my presence, and it accurately records my words and actions.     Maria Luz Miller DO 08/14/18 4:40 PM        Maria Luz Miller DO  08/14/18 0662

## 2018-08-15 VITALS
SYSTOLIC BLOOD PRESSURE: 132 MMHG | HEART RATE: 68 BPM | HEIGHT: 72 IN | OXYGEN SATURATION: 94 % | TEMPERATURE: 98.1 F | WEIGHT: 280.2 LBS | RESPIRATION RATE: 16 BRPM | DIASTOLIC BLOOD PRESSURE: 67 MMHG | BODY MASS INDEX: 37.95 KG/M2

## 2018-08-15 LAB
ANION GAP SERPL CALCULATED.3IONS-SCNC: 12 MEQ/L (ref 8–16)
BASOPHILS # BLD: 0.4 %
BASOPHILS ABSOLUTE: 0 THOU/MM3 (ref 0–0.1)
BODY FLUID RBC: ABNORMAL /CUMM
BUN BLDV-MCNC: 12 MG/DL (ref 7–22)
CALCIUM SERPL-MCNC: 9.1 MG/DL (ref 8.5–10.5)
CHARACTER, BODY FLUID: ABNORMAL
CHLORIDE BLD-SCNC: 99 MEQ/L (ref 98–111)
CO2: 30 MEQ/L (ref 23–33)
COLOR: ABNORMAL
CREAT SERPL-MCNC: 1.1 MG/DL (ref 0.4–1.2)
EOSINOPHIL # BLD: 2.8 %
EOSINOPHILS ABSOLUTE: 0.3 THOU/MM3 (ref 0–0.4)
ERYTHROCYTE [DISTWIDTH] IN BLOOD BY AUTOMATED COUNT: 13.1 % (ref 11.5–14.5)
ERYTHROCYTE [DISTWIDTH] IN BLOOD BY AUTOMATED COUNT: 41.7 FL (ref 35–45)
GFR SERPL CREATININE-BSD FRML MDRD: 66 ML/MIN/1.73M2
GLUCOSE BLD-MCNC: 142 MG/DL (ref 70–108)
GLUCOSE BLD-MCNC: 169 MG/DL (ref 70–108)
GLUCOSE BLD-MCNC: 172 MG/DL (ref 70–108)
HCT VFR BLD CALC: 37.6 % (ref 42–52)
HEMOGLOBIN: 12.2 GM/DL (ref 14–18)
IMMATURE GRANS (ABS): 0.04 THOU/MM3 (ref 0–0.07)
IMMATURE GRANULOCYTES: 0.4 %
INR BLD: 2.37 (ref 0.85–1.13)
LYMPHOCYTES # BLD: 15.6 %
LYMPHOCYTES ABSOLUTE: 1.8 THOU/MM3 (ref 1–4.8)
MCH RBC QN AUTO: 28.3 PG (ref 26–33)
MCHC RBC AUTO-ENTMCNC: 32.4 GM/DL (ref 32.2–35.5)
MCV RBC AUTO: 87.2 FL (ref 80–94)
MONOCYTES # BLD: 10.8 %
MONOCYTES ABSOLUTE: 1.2 THOU/MM3 (ref 0.4–1.3)
MONONUCLEAR CELLS BODY FLUID: 68.7 %
NUCLEATED RED BLOOD CELLS: 0 /100 WBC
PATHOLOGIST REVIEW: ABNORMAL
PLATELET # BLD: 307 THOU/MM3 (ref 130–400)
PMV BLD AUTO: 10.7 FL (ref 9.4–12.4)
POLYMORPHONUCLEAR CELLS BODY FLUID: 31.3 %
POTASSIUM REFLEX MAGNESIUM: 4.4 MEQ/L (ref 3.5–5.2)
RBC # BLD: 4.31 MILL/MM3 (ref 4.7–6.1)
SEG NEUTROPHILS: 70 %
SEGMENTED NEUTROPHILS ABSOLUTE COUNT: 7.9 THOU/MM3 (ref 1.8–7.7)
SODIUM BLD-SCNC: 141 MEQ/L (ref 135–145)
SPECIMEN: ABNORMAL
TOTAL NUCLEATED CELLS BODY FLUID: 2537 /CUMM (ref 0–500)
TOTAL VOLUME RECEIVED BODY FLUID: 80 ML
WBC # BLD: 11.3 THOU/MM3 (ref 4.8–10.8)

## 2018-08-15 PROCEDURE — 36415 COLL VENOUS BLD VENIPUNCTURE: CPT

## 2018-08-15 PROCEDURE — 82948 REAGENT STRIP/BLOOD GLUCOSE: CPT

## 2018-08-15 PROCEDURE — 80048 BASIC METABOLIC PNL TOTAL CA: CPT

## 2018-08-15 PROCEDURE — 97530 THERAPEUTIC ACTIVITIES: CPT

## 2018-08-15 PROCEDURE — 99233 SBSQ HOSP IP/OBS HIGH 50: CPT | Performed by: INTERNAL MEDICINE

## 2018-08-15 PROCEDURE — 6370000000 HC RX 637 (ALT 250 FOR IP): Performed by: INTERNAL MEDICINE

## 2018-08-15 PROCEDURE — G8987 SELF CARE CURRENT STATUS: HCPCS

## 2018-08-15 PROCEDURE — 85610 PROTHROMBIN TIME: CPT

## 2018-08-15 PROCEDURE — 99239 HOSP IP/OBS DSCHRG MGMT >30: CPT | Performed by: INTERNAL MEDICINE

## 2018-08-15 PROCEDURE — G8988 SELF CARE GOAL STATUS: HCPCS

## 2018-08-15 PROCEDURE — 97166 OT EVAL MOD COMPLEX 45 MIN: CPT

## 2018-08-15 PROCEDURE — 85025 COMPLETE CBC W/AUTO DIFF WBC: CPT

## 2018-08-15 PROCEDURE — APPSS45 APP SPLIT SHARED TIME 31-45 MINUTES: Performed by: NURSE PRACTITIONER

## 2018-08-15 PROCEDURE — 2580000003 HC RX 258: Performed by: INTERNAL MEDICINE

## 2018-08-15 RX ORDER — WARFARIN SODIUM 2.5 MG/1
2.5 TABLET ORAL
Status: COMPLETED | OUTPATIENT
Start: 2018-08-15 | End: 2018-08-15

## 2018-08-15 RX ORDER — IPRATROPIUM BROMIDE 42 UG/1
2 SPRAY, METERED NASAL DAILY
COMMUNITY

## 2018-08-15 RX ORDER — WARFARIN SODIUM 2.5 MG/1
TABLET ORAL EVERY EVENING
COMMUNITY
End: 2018-11-13 | Stop reason: ALTCHOICE

## 2018-08-15 RX ORDER — FUROSEMIDE 20 MG/1
20 TABLET ORAL 2 TIMES DAILY
Qty: 60 TABLET | Refills: 0 | Status: SHIPPED | OUTPATIENT
Start: 2018-08-15 | End: 2018-08-22 | Stop reason: DRUGHIGH

## 2018-08-15 RX ADMIN — FUROSEMIDE 20 MG: 20 TABLET ORAL at 09:31

## 2018-08-15 RX ADMIN — WARFARIN SODIUM 2.5 MG: 2.5 TABLET ORAL at 17:18

## 2018-08-15 RX ADMIN — Medication 2 UNITS: at 12:25

## 2018-08-15 RX ADMIN — CARVEDILOL 6.25 MG: 6.25 TABLET, FILM COATED ORAL at 17:18

## 2018-08-15 RX ADMIN — GABAPENTIN 600 MG: 600 TABLET, FILM COATED ORAL at 09:31

## 2018-08-15 RX ADMIN — Medication 10 ML: at 09:31

## 2018-08-15 RX ADMIN — AMIODARONE HYDROCHLORIDE 200 MG: 200 TABLET ORAL at 09:31

## 2018-08-15 RX ADMIN — CARVEDILOL 6.25 MG: 6.25 TABLET, FILM COATED ORAL at 09:31

## 2018-08-15 RX ADMIN — FAMOTIDINE 20 MG: 20 TABLET ORAL at 09:31

## 2018-08-15 RX ADMIN — Medication 2 UNITS: at 09:27

## 2018-08-15 RX ADMIN — GABAPENTIN 600 MG: 600 TABLET, FILM COATED ORAL at 13:15

## 2018-08-15 RX ADMIN — FINASTERIDE 5 MG: 5 TABLET, FILM COATED ORAL at 09:31

## 2018-08-15 RX ADMIN — INSULIN HUMAN 35 UNITS: 100 INJECTION, SUSPENSION SUBCUTANEOUS at 06:42

## 2018-08-15 ASSESSMENT — PAIN SCALES - GENERAL
PAINLEVEL_OUTOF10: 0
PAINLEVEL_OUTOF10: 0

## 2018-08-15 NOTE — PROGRESS NOTES
Clinical Pharmacy Note    Drea Foreman is a 70 y.o. male for whom pharmacy has been asked to manage warfarin therapy. Reason for Admission: SOB / Large right sided pleural effusion    Consulting Physician: Dr. Francis Arita  Warfarin dose prior to admission: 1.25 mg MF 2.5 mg all other days  Warfarin indication: Afib  Target INR range: 2-3   Outpatient warfarin provider:  [facility]    Past Medical History:   Diagnosis Date    Arthritis     [facility] ([facility] [address]     Cancer ([facility]     skin    Chronic kidney disease     [facility]     History of esophagogastroduodenoscopy (EGD) 6/7/16    History of kidney stones     Hyperlipidemia     Hypertension                 Recent Labs      08/15/18   0439   INR  2.37*     Recent Labs      08/14/18   0950  08/15/18   0439   HGB  12.8*  12.2*   HCT  39.6*  37.6*   PLT  280  307       Current warfarin drug-drug interactions: Amiodarone (HM)    Received Vit K 10 mg po 8/14/2018 1454    Date INR Warfarin Dose   8/14/2018 3.62 Vitamin K 10mg - thoracentesis    8/15/2018 2.37 2.5 mg                                   Daily PT/INR until stable within therapeutic range. Thank you for the consult.       Zulma Early PharmD, [facility]  8/15/2018  11:29 AM

## 2018-08-15 NOTE — PROGRESS NOTES
Notified by Dr. Zehra Sierra that he would like the patient to be seen by Dr. Katey Pang next week, message left with [facility] scheduling to call patient to schedule an appointment next week. Called patient to notify him of this and informed him that if he doesn't hear from Dr. Katey Valdes office to schedule an appointment in the next day or 2, to call their office to schedule an appointment next week.

## 2018-08-15 NOTE — PROGRESS NOTES
CLINICAL PHARMACY: DISCHARGE MED RECONCILIATION/REVIEW    [facility]) Select Patient?: No  Total # of Interventions Recommended: 0   -   Total # Interventions Accepted: 0  Intervention Severity:   - Level 1 Intervention Present?: No   - Level 2 #: 0   - Level 3 #: 0   Time Spent (min): 30    Additional Documentation:  Discharge medications reviewed.      Vandana Galo PharmD, [facility]  8/15/2018 4:36 PM

## 2018-08-15 NOTE — CARE COORDINATION
8/15/18, 12:06 PM      Yuki Watts       Admitted from: ED 2018/ [address] day: 1   Location: [facility] Reason for admit: Pleural effusion on right [J90] Status: IP  Admit order signed?: yes  PMH:  has a past medical history of Arthritis; Atrial fibrillation ([address] Cancer ([facility] [address] Chronic kidney disease; Diabetes mellitus ([facility] [address] History of esophagogastroduodenoscopy (EGD); History of kidney stones; Hyperlipidemia; and Hypertension. Procedure:  Right Pleural Effusion = 2L removed  Pertinent abnormal Imagin/14 Right Pleural Effusion, possible Pneumonia  Medications:  Scheduled Meds:   warfarin (COUMADIN) daily dosing (placeholder)   Other RX Placeholder    amiodarone  200 mg Oral BID    atorvastatin  20 mg Oral Nightly    carvedilol  6.25 mg Oral BID WC    famotidine  20 mg Oral BID    docusate sodium  200 mg Oral Nightly    finasteride  5 mg Oral Daily    furosemide  20 mg Oral Daily    gabapentin  600 mg Oral TID    insulin NPH  35 Units Subcutaneous BID AC    tamsulosin  0.4 mg Oral QPM    sodium chloride flush  10 mL Intravenous 2 times per day    insulin lispro  0-12 Units Subcutaneous TID WC    insulin lispro  0-6 Units Subcutaneous Nightly    pneumococcal polyvalent  0.5 mL Intramuscular Once     Continuous Infusions:   dextrose        Pertinent Info/Orders/Treatment Plan:  Elevated WBC/BNP; monitor. Pulmonary following  Diet: DIET CARB CONTROL;   DVT Prophylaxis: Coumadin  Smoking status:  reports that he has never smoked. He has never used smokeless tobacco.   Influenza Vaccination Screening Completed: n/a  Pneumonia Vaccination Screening Completed: no, updated nsg  PCP: Omid Quezada MD  Readmission: yes  Patient has been readmitted within 28 days. Patient went to f/u appointment? yes  If yes, was it within 7 days? yes  Patient was able to fill prescriptions? yes  Patient is taking medications as prescribed? yes  Cause for readmission?  Pleural

## 2018-08-15 NOTE — PLAN OF CARE
Problem: Falls - Risk of:  Goal: Will remain free from falls  Will remain free from falls   Outcome: Ongoing  Pt has had no falls so far this shift. Pt up with 1A. Problem: Pain:  Goal: Pain level will decrease  Pain level will decrease  Outcome: Ongoing  Pain Assessment: 0-10  Pain Level: 0   Pain goal: No pain  Is pain goal met at this time? Yes     Additional interventions to be implemented: position change and rest      Problem: Discharge Planning:  Goal: Discharged to appropriate level of care  Discharged to appropriate level of care  Outcome: Ongoing  Pt plans to be discharged home with wife. Comments: Care plan reviewed with patient and family. Patient and family verbalize understanding of the plan of care and contribute to goal setting.

## 2018-08-15 NOTE — DISCHARGE SUMMARY
Hospital Medicine Discharge Summary      Patient Identification:   Drea Foreman   : 1950  MRN: [id number]   Account: [de-identified]      Patient's PCP: Manoj De La Garza MD    Admit Date: 2018     Discharge Date:   4/15/18    Admitting Physician: Rennis Shone, MD     Discharge Physician: Laura Pringle MD     Discharge Diagnoses: Active Hospital Problems    Diagnosis Date Noted    Diabetes mellitus type 2 in obese ([address] [E11.69, E66.9] 2013     Priority: High     Class: Chronic    Essential hypertension, benign [I10] 2013     Priority: Low     Class: Chronic    Pleural effusion on right [J90] 2018    Atrial fibrillation ([facility] [address] [I48.91] 2018       The patient was seen and examined on day of discharge and this discharge summary is in conjunction with any daily progress note from day of discharge. Hospital Course:   Drea Foreman is a 70 y.o. male admitted to [facility] on 2018 for Large pleural effusion s/p thoracentesis. Now stable. Some pain at site pf procedure. Cleared by Pulm for d/c. Increase lasix to 20 mg BID  See progress note    Exam:     Vitals:  Vitals:    08/15/18 0300 08/15/18 0920 08/15/18 0922 08/15/18 1200   BP: 130/60 135/63  132/67   Pulse: 78 73  68   Resp: 20 16  16   Temp: 98.1 °F (36.7 °C) 100.1 °F (37.8 °C) 99 °F (37.2 °C) 98.1 °F (36.7 °C)   TempSrc: Oral Oral Axillary Oral   SpO2: 95% 94%  94%   Weight: 280 lb 3.2 oz (127.1 kg)      Height:         Weight: Weight: 280 lb 3.2 oz (127.1 kg)     24 hour intake/output:  Intake/Output Summary (Last 24 hours) at 08/15/18 1532  Last data filed at 08/15/18 0257   Gross per 24 hour   Intake              200 ml   Output                0 ml   Net              200 ml                Labs:  For convenience and continuity at follow-up the following most recent labs are provided:      CBC:    Lab Results   Component Value Date    WBC 11.3 08/15/2018    HGB 12.2 08/15/2018    HCT Lunch and bedtime             ipratropium (ATROVENT) 0.06 % nasal spray  2 sprays by Nasal route daily             NONFORMULARY  Take 1 tablet by mouth 2 times daily Milk Thistle             NONFORMULARY  Take 1 capsule by mouth 2 times daily Equate Digestive care probiotic             potassium chloride (KLOR-CON M) 20 MEQ extended release tablet  Take 1 tablet by mouth daily             tamsulosin (FLOMAX) 0.4 MG capsule  take 1 capsule by mouth once daily             warfarin (COUMADIN) 2.5 MG tablet  Take 1 tablet by mouth every evening Dosed by [facility]. Time Spent on discharge is more than 40 minutes in the examination, evaluation, counseling and review of medications and discharge plan. Signed: Thank you Manoj De La Garza MD for the opportunity to be involved in this patient's care.     Electronically signed by Laura Pringle MD on 8/15/2018 at 3:32 PM

## 2018-08-15 NOTE — PROGRESS NOTES
Hospitalist Progress Note    Patient:  Jeff Mcguire      Unit/Bed:4K-06/006-A    YOB: 1950    MRN: [id number]       Acct: [de-identified]     PCP: Elaine Michael MD    Date of Admission: 8/14/2018    Chief Complaint: SOB    Hospital Course: Pt is a 69 y/o who had  MAZE procedure about 4 weeks ago, now presenting with large pleural effusion s/p thoracentesis. Pulm following. Now breathing better. Fluid hemorrhagic. Monitor    Subjective: Breathing has improved,      Medications:  Reviewed    Infusion Medications    dextrose       Scheduled Medications    amiodarone  200 mg Oral BID    atorvastatin  20 mg Oral Nightly    carvedilol  6.25 mg Oral BID WC    famotidine  20 mg Oral BID    docusate sodium  200 mg Oral Nightly    finasteride  5 mg Oral Daily    furosemide  20 mg Oral Daily    gabapentin  600 mg Oral TID    insulin NPH  35 Units Subcutaneous BID AC    tamsulosin  0.4 mg Oral QPM    sodium chloride flush  10 mL Intravenous 2 times per day    insulin lispro  0-12 Units Subcutaneous TID WC    insulin lispro  0-6 Units Subcutaneous Nightly    pneumococcal polyvalent  0.5 mL Intramuscular Once     PRN Meds: sodium chloride flush, potassium chloride **OR** potassium chloride **OR** potassium chloride, glucose, dextrose, glucagon (rDNA), dextrose, ondansetron      Intake/Output Summary (Last 24 hours) at 08/15/18 1036  Last data filed at 08/15/18 0257   Gross per 24 hour   Intake              200 ml   Output                0 ml   Net              200 ml       Diet:  DIET CARB CONTROL; Exam:  /63   Pulse 73   Temp 99 °F (37.2 °C) (Axillary)   Resp 16   Ht 6' (1.829 m)   Wt 280 lb 3.2 oz (127.1 kg)   SpO2 94%   BMI 38.00 kg/m²     General appearance: No apparent distress, appears stated age and cooperative. HEENT: Pupils equal, round, and reactive to light. Conjunctivae/corneas clear. Neck: Supple, with full range of motion.    Respiratory:  Normal respiratory effort. Clear to auscultation  Cardiovascular: Regular rate and rhythm with normal S1/S2  Abdomen: Soft, non-tender, non-distended with normal bowel sounds. Musculoskeletal: passive and active ROM x 4 extremities. Skin: Skin color, texture, turgor normal.  No rashes or lesions. Neurologic:  Neurovascularly intact without any focal sensory/motor deficits. Cranial nerves: II-XII intact, grossly non-focal.  Psychiatric: Alert and oriented, thought content appropriate, normal insight  Capillary Refill: Brisk,< 3 seconds   Peripheral Pulses: +2 palpable, equal bilaterally       Labs:   Recent Labs      08/14/18   0950  08/15/18   0439   WBC  9.5  11.3*   HGB  12.8*  12.2*   HCT  39.6*  37.6*   PLT  280  307     Recent Labs      08/14/18   0950  08/15/18   0439   NA  142  141   K  3.8  4.4   CL  100  99   CO2  28  30   BUN  13  12   CREATININE  1.0  1.1   CALCIUM  9.4  9.1     Recent Labs      08/14/18   0950   AST  14   ALT  7*   BILIDIR  <0.2   BILITOT  0.6   ALKPHOS  91     Recent Labs      08/14/18   0950  08/15/18   0439   INR  3.62*  2.37*     No results for input(s): CKTOTAL, TROPONINI in the last 72 hours. Urinalysis:    Lab Results   Component Value Date    NITRU NEGATIVE 07/13/2018    WBCUA 5-10 07/13/2018    BACTERIA NONE 07/13/2018    RBCUA 10-15 07/13/2018    BLOODU MODERATE 07/13/2018    SPECGRAV 1.017 07/13/2018    GLUCOSEU Negative 01/19/2018       Radiology:  US THORACENTESIS   Final Result   Status post right-sided thoracentesis            **This report has been created using voice recognition software. It may contain minor errors which are inherent in voice recognition technology. **      Final report electronically signed by Dr. Jose Pacheco on 8/14/2018 5:28 PM      XR CHEST PA INSPIRATION 1 VW   Final Result   1. Small to moderate right-sided pleural effusion with right basilar consolidative atelectasis or pneumonia.  The amount of pleural fluid on the right is decreased when compared to

## 2018-08-15 NOTE — PROGRESS NOTES
[address] ICU STEPDOWN TELEMETRY 4K  DAILY NOTE    Time:  Time In: 8285  Time Out: 3284  Timed Code Treatment Minutes: 23 Minutes  Minutes: 38          Date: 8/15/2018  Patient Name: Germaine Cortes,   Gender: male      Room: [facility]-  MRN: [id number]  : 1950  (70 y.o.)  Referring Practitioner: Balta Del Valle MD  Diagnosis: pleural effusion on right  Additional Pertinent Hx: Germaine Cortes is a 70 y.o. male who presents to the emergency department for the evaluation shortness of breath that began this morning. The patient has a history of A-fib, hypertension, cardiac ablation, and diabetes. Patient also developed some chest pressure and palpitations with his shortness of breath. . He underwent External Maze Procedure along with Ligation of the left atrial appendage by Dr. Micah Ivey on 18. During his initial work up in ED, he was found to have right pleural effusion.  underwent thoracentesis. Past Medical History:   Diagnosis Date    Arthritis     Atrial fibrillation ([address]     Cancer ([address]     skin    Chronic kidney disease     Diabetes mellitus ([address]     History of esophagogastroduodenoscopy (EGD) 16    History of kidney stones     Hyperlipidemia     Hypertension      Past Surgical History:   Procedure Laterality Date    BACK SURGERY  2017    CARDIAC CATHETERIZATION  ??    no stents    CHOLECYSTECTOMY  ??    COLONOSCOPY      last one ??    CYSTOSCOPY  2017    KIDNEY STONE SURGERY Left 2017    basket retrievel   [facility]? ??    arthroscopy, left    LITHOTRIPSY  ?? -  2016??     kidney stones    OTHER SURGICAL HISTORY  2016    LEFT ESWL    [place] OFFICE/OUTPT VISIT,PROCEDURE ONLY N/A 2018    5--BOX THORASCOPIC MAZE WITH IGGY performed by Rito Rodarte MD at [address] [place] [address]  ??    right    SKIN BIOPSY      URETEROSCOPY Left 2017 as fatigued requiring cuing to slow down. Slightly forward flexed posture. Activity Tolerance:  Activity Tolerance: Patient Tolerated treatment well  Activity Tolerance: Pt reported decreased overall strength/activity tolerance since discharge from hospital. Pt interested in going to [facility] for exercise upon discharge, and agreed with interest although reinforced need to clear with MD first. Reinforced gradual increase in activity as pt reported likely overdid activity at home when \"I was feeling really great one day. \" Initiated Loann Gift exercises with pt only able to complete 1 exercise with green theraband before breakfast arrived and pt requested to eat breakfast. Education, demo, and handout theraband/provided to pt for remainder of UB exercises. Reinforced daily completion of exercises to continue increasing overall strength/endurance needed for ADL/IADL completion with pt verbalizing understanding. Assessment:  Assessment: Pt presents with decreased activity tolerance impacting ability to complete ADL, mobility, and transfers. Pt will continue to benefit from OT services to increase independence with these tasks.    Performance deficits / Impairments: Decreased functional mobility , Decreased ADL status, Decreased strength, Decreased endurance, Decreased balance, Decreased high-level

## 2018-08-15 NOTE — PROGRESS NOTES
[facility]    Patient - Tray Morris   MRN -  [id number]   [facility] # - [de-identified]   - 1950      Date of Admission -  2018  9:39 AM  Date of evaluation -  8/15/2018  Room - [facility]-A   Hospital Day - 1  Consulting - Yan Senior MD Primary Care Physician - Kateryna Lopez MD     Problem List      Active Hospital Problems    Diagnosis Date Noted    Diabetes mellitus type 2 in obese ([facility] [E11.69, E66.9] 2013     Priority: High     Class: Chronic    Essential hypertension, benign [I10] 2013     Priority: Low     Class: Chronic    Pleural effusion on right [J90] 2018    Atrial fibrillation ([facility] [I48.91] 2018     Reason for Consult    Following for pleural effusion   HPI   History Obtained From: Patient and electronic medical record. Tray Morris is a 70 y.o. male  was initially admitted under hospitalist service. Pulmonary medicine was consulted for further management of pleural effusion. The patient is a 70 y.o. male with past medical hx of chronic/ Permanent atrial fibrillation used to be on coumadin treatment. He underwent External Maze Procedure I.e 5-box thoracoscopic maze procedure along with Ligation of the left atrial appendage by Dr. Hola Schwarz on 18. He started having worsening of shortness of breath since that time. During his initial work up in ED, he was found to have right pleural effusion. He denies any previous history of pleural effusion or thoracentesis. He denies any history of hemoptysis. He denies any history of orthopnea or paroxysmal nocturnal dyspnea. He denies any fever, chills or rigors at this time. He was never diagnosed with malignancy in the past. No recent history of trauma or fall injury to chest wall. No previous history of pulmonary embolism or pancreatitis. He  Never diagnosed with Rheumatoid arthritis or any connective tissue diseases. No hx of yellow nail syndrome.  He was never at 08/15/18 0257   Gross per 24 hour   Intake              200 ml   Output                0 ml   Net              200 ml     I/O last 3 completed shifts: In: 200 [P.O.:200]  Out: 0    Patient Vitals for the past 96 hrs (Last 3 readings):   Weight   08/15/18 0300 280 lb 3.2 oz (127.1 kg)   08/14/18 0952 (!) 302 lb (137 kg)       Exam   Physical Exam   Constitutional: No distress on  Room air, patient is calm and alert in no distress   Head: Normocephalic and atraumatic. Right Ear: External ear normal in appearance. Left Ear: External ear normal in appearance. Mouth/Throat: Oropharynx is clear and moist.  No oral thrush. Eyes: Conjunctivae are normal. Pupils are equal, round, and reactive to light. No scleral icterus. Neck: Neck supple. No tracheal deviation present. Cardiovascular: Regular rate, regular rhythm, S1 and S2 with no murmur. No peripheral edema  Pulmonary/Chest: Normal effort with few fine rales bilaterally . No stridor. No respiratory distress. Patient exhibits no tenderness. Abdominal: Soft. Bowel sounds audible. No distension or tenderness to palp. Oni Mauro Neurological: Patient is alert and oriented to person, place, and time. Skin: Warm and dry.    Labs  - Old records and notes have been reviewed in [facility]   ABG  Lab Results   Component Value Date    PH 7.34 07/12/2018    PO2 98 07/12/2018    PCO2 42 07/12/2018    HCO3 23 07/12/2018    O2SAT 97 07/12/2018     Lab Results   Component Value Date    IFIO2 45 07/12/2018    MODE CPAP/PS 07/12/2018    SETTIDVOL 590 07/12/2018    SETPEEP 5.0 07/12/2018     CBC  Recent Labs      08/14/18   0950  08/15/18   0439   WBC  9.5  11.3*   RBC  4.49*  4.31*   HGB  12.8*  12.2*   HCT  39.6*  37.6*   MCV  88.2  87.2   MCH  28.5  28.3   MCHC  32.3  32.4   PLT  280  307   MPV  10.6  10.7      BMP  Recent Labs      08/14/18   0950  08/15/18   0439   NA  142  141   K  3.8  4.4   CL  100  99   CO2  28  30   BUN  13  12   CREATININE  1.0  1.1   GLUCOSE  89  172*

## 2018-08-15 NOTE — DISCHARGE INSTR - DIET
 Good nutrition is important when healing from an illness, injury, or surgery. Follow any nutrition recommendations given to you during your hospital stay.  If you were given an oral nutrition supplement while in the hospital, continue to take this supplement at home. You can take it with meals, in-between meals, and/or before bedtime. These supplements can be purchased at most local grocery stores, pharmacies, and chain super-stores.  If you have any questions about your diet or nutrition, call the hospital and ask for the dietitian. You are being placed on a diabetic carb counting diet. Eating healthy is the first step in controlling diabetes    Here's how to get started. ... Eat 3 meals a day. Eat your meals at the same time each day and do not skip meals. Eat about the same amount of food each day. Limit sugar and sweets. Eat less candy, desserts, pastries and jelly. Limit intake of regular pop, sugary beverages and fruit juice. Drink sugar free beverages such as diet pop, water, Crystal Light, and unsweetened tea instead. Use Equal or Sweet-n-Low in place of sugar. Lose weight if you are overweight. Even a small amount of weight loss may help improve your blood sugar control. To help lose weight, reduce your portion sizes. Control your intake of carbohydrates. Carbohydrate is the main  nutrient that affects blood sugar levels. All the carbohydrate you eat is turned  into sugar by your body. Therefore, it is important to control  the amount  of carbohydrate that you eat a day. You should eat about 60-75  grams of  carbohydrate at each meal.      Common sources of carbohydrates:     Eat more fiber. Fiber can help slow down the rise in blood sugar following a meal.  To get more fiber in your diet, eat at least 5 servings of fruits and vegetables a day, choose whole grain bread/cereal and eat more beans or legumes. Reduce your intake of high fat foods.    Cutting back

## 2018-08-15 NOTE — H&P
SKIN BIOPSY      URETEROSCOPY Left 05/02/2017       Medications Prior to Admission:      Prior to Admission medications    Medication Sig Start Date End Date Taking? Authorizing Provider   amiodarone (CORDARONE) 200 MG tablet Take 1 tablet by mouth 2 times daily 7/30/18  Yes RAMIREZ Aden CNP   aspirin 325 MG EC tablet Take 1 tablet by mouth daily 7/18/18  Yes Santiago Redman PA-C   atorvastatin (LIPITOR) 20 MG tablet Take 1 tablet by mouth nightly 7/17/18  Yes Santiago Redman PA-C   carvedilol (COREG) 6.25 MG tablet Take 1 tablet by mouth 2 times daily (with meals)  Patient taking differently: Take 6.25 mg by mouth 2 times daily (with meals)  7/17/18  Yes Santiago Redman PA-C   famotidine (PEPCID) 20 MG tablet Take 1 tablet by mouth 2 times daily 7/17/18  Yes Santiago Redman PA-C   furosemide (LASIX) 20 MG tablet Take 1 tablet by mouth daily  Patient taking differently: Take 20 mg by mouth daily  7/17/18  Yes Santiago Redman PA-C   potassium chloride (KLOR-CON M) 20 MEQ extended release tablet Take 1 tablet by mouth daily 7/17/18  Yes Santiago Redman PA-C   warfarin (COUMADIN) 2.5 MG tablet Take 1 tablet by mouth every evening Dosed by [facility]. Patient taking differently: Take 2.5 mg by mouth every evening Dosed by [facility].  3/6/18  Yes Bill Radford MD   NONFORMULARY Take 1 tablet by mouth 2 times daily Milk Thistle   Yes Historical Provider, MD   NONFORMULARY Take 1 capsule by mouth 2 times daily Equate Digestive care probiotic   Yes Historical Provider, MD   docusate sodium (COLACE) 250 MG capsule Take 250 mg by mouth nightly    Yes Historical Provider, MD   tamsulosin (FLOMAX) 0.4 MG capsule take 1 capsule by mouth once daily  Patient taking differently: take 1 capsule by mouth once nightly 2/5/18  Yes Joel Remedies, APRN - CNP   insulin NPH (HUMULIN N;NOVOLIN N) 100 UNIT/ML injection vial Inject 40 Units into the skin 2 times daily Lunch and bedtime   Yes today.  - consult pulmo for guidance on workup of pleural fluid  - contact Dr. Jonathan Mann to assess possible relation to procedure. - continue lasix    Atrial Fibrillation: On amiodarone, coumadin, coreg. S/p recent cardioversion and 5-box thorascopic maze procedure. EKG with NSR on admission.  - hold AC and give vitamin K for thora. INR was supratherapeutic at 3.62 this AM  - check INR in AM, Likely resume warfarin tomorrow. Type 2 DM on insulin complicated by neuropathy: Continue NPH and lispro  - carb controlled diet  - continue gabapenin    Hx BPH: Continue flomax and Proscar    Recent fall at home: Denies hitting head, isnt sure what his blood sugar was then. No injuries from fall  - consult PT/OT  - check vitamin D in AM.     Thank you Wayne Main MD for the opportunity to be involved in this patient's care.     Electronically signed by Melinda Foote MD on 8/14/2018 at 8:30 PM

## 2018-08-15 NOTE — PLAN OF CARE
[facility]  PHYSICAL THERAPY MISSED TREATMENT NOTE  ACUTE CARE    Date: 8/15/2018  Patient Name: Surendra Sands        MRN: [id number]   : 1950  (70 y.o.)  Gender: male   Referring Practitioner: Ben Botello MD  Diagnosis: pleural effusion on right         REASON FOR MISSED TREATMENT:  Patient refused treatment. RN just got order for discharge for pt. Pt had been seen by OT and was moving fairly well and when offered PT eval with new information that discharge was just ordered, pt requested to hold off on therapy to save energy for going home. LE exercises handout offered for Home Exercise Program and accepted with understanding of each exercise noted. Will defer eval at this time. Hipolito Friend.  Peter Dill [place] [address]

## 2018-08-16 ENCOUNTER — TELEPHONE (OUTPATIENT)
Dept: ADMINISTRATIVE | Age: 68
End: 2018-08-16

## 2018-08-19 LAB
ANAEROBIC CULTURE: NORMAL
BODY FLUID CULTURE, STERILE: NORMAL
GRAM STAIN RESULT: NORMAL

## 2018-08-22 ENCOUNTER — OFFICE VISIT (OUTPATIENT)
Dept: CARDIOTHORACIC SURGERY | Age: 68
End: 2018-08-22
Payer: MEDICARE

## 2018-08-22 ENCOUNTER — HOSPITAL ENCOUNTER (OUTPATIENT)
Dept: PHARMACY | Age: 68
Setting detail: THERAPIES SERIES
Discharge: HOME | End: 2018-08-22
Payer: MEDICARE

## 2018-08-22 VITALS
SYSTOLIC BLOOD PRESSURE: 128 MMHG | WEIGHT: 286.6 LBS | DIASTOLIC BLOOD PRESSURE: 75 MMHG | HEART RATE: 63 BPM | BODY MASS INDEX: 38.82 KG/M2 | HEIGHT: 72 IN

## 2018-08-22 DIAGNOSIS — Z86.79 S/P MAZE OPERATION FOR ATRIAL FIBRILLATION: ICD-10-CM

## 2018-08-22 DIAGNOSIS — Z98.890 S/P MAZE OPERATION FOR ATRIAL FIBRILLATION: ICD-10-CM

## 2018-08-22 DIAGNOSIS — I48.21 PERMANENT ATRIAL FIBRILLATION (HCC): ICD-10-CM

## 2018-08-22 DIAGNOSIS — I48.91 ATRIAL FIBRILLATION, UNSPECIFIED TYPE (HCC): Primary | ICD-10-CM

## 2018-08-22 LAB — POC INR: 4.2 (ref 0.8–1.2)

## 2018-08-22 PROCEDURE — 36416 COLLJ CAPILLARY BLOOD SPEC: CPT

## 2018-08-22 PROCEDURE — 99024 POSTOP FOLLOW-UP VISIT: CPT | Performed by: THORACIC SURGERY (CARDIOTHORACIC VASCULAR SURGERY)

## 2018-08-22 PROCEDURE — 99211 OFF/OP EST MAY X REQ PHY/QHP: CPT

## 2018-08-22 PROCEDURE — 85610 PROTHROMBIN TIME: CPT

## 2018-08-22 PROCEDURE — 93000 ELECTROCARDIOGRAM COMPLETE: CPT | Performed by: THORACIC SURGERY (CARDIOTHORACIC VASCULAR SURGERY)

## 2018-08-22 RX ORDER — POTASSIUM CHLORIDE 20 MEQ/1
20 TABLET, EXTENDED RELEASE ORAL 2 TIMES DAILY
Qty: 60 TABLET | Refills: 3
Start: 2018-08-22 | End: 2019-02-11 | Stop reason: ALTCHOICE

## 2018-08-22 RX ORDER — AMIODARONE HYDROCHLORIDE 200 MG/1
200 TABLET ORAL DAILY
Qty: 60 TABLET | Refills: 3
Start: 2018-08-22 | End: 2018-09-05 | Stop reason: ALTCHOICE

## 2018-08-22 RX ORDER — FUROSEMIDE 20 MG/1
40 TABLET ORAL DAILY
Qty: 60 TABLET | Refills: 0 | Status: SHIPPED
Start: 2018-08-22 | End: 2018-12-12 | Stop reason: SDUPTHER

## 2018-09-04 ENCOUNTER — HOSPITAL ENCOUNTER (OUTPATIENT)
Dept: GENERAL RADIOLOGY | Age: 68
Discharge: HOME | End: 2018-09-04
Payer: MEDICARE

## 2018-09-04 ENCOUNTER — HOSPITAL ENCOUNTER (OUTPATIENT)
Age: 68
Discharge: HOME | End: 2018-09-04
Payer: MEDICARE

## 2018-09-04 ENCOUNTER — HOSPITAL ENCOUNTER (OUTPATIENT)
Dept: PHARMACY | Age: 68
Setting detail: THERAPIES SERIES
Discharge: HOME | End: 2018-09-04
Payer: MEDICARE

## 2018-09-04 DIAGNOSIS — I48.91 ATRIAL FIBRILLATION, UNSPECIFIED TYPE (HCC): ICD-10-CM

## 2018-09-04 DIAGNOSIS — I48.21 PERMANENT ATRIAL FIBRILLATION (HCC): ICD-10-CM

## 2018-09-04 LAB
ANION GAP SERPL CALCULATED.3IONS-SCNC: 14 MEQ/L (ref 8–16)
BUN BLDV-MCNC: 9 MG/DL (ref 7–22)
CALCIUM SERPL-MCNC: 9.3 MG/DL (ref 8.5–10.5)
CHLORIDE BLD-SCNC: 100 MEQ/L (ref 98–111)
CO2: 27 MEQ/L (ref 23–33)
CREAT SERPL-MCNC: 1 MG/DL (ref 0.4–1.2)
GFR SERPL CREATININE-BSD FRML MDRD: 74 ML/MIN/1.73M2
GLUCOSE BLD-MCNC: 77 MG/DL (ref 70–108)
POC INR: 3.8 (ref 0.8–1.2)
POTASSIUM SERPL-SCNC: 3.7 MEQ/L (ref 3.5–5.2)
SODIUM BLD-SCNC: 141 MEQ/L (ref 135–145)

## 2018-09-04 PROCEDURE — 85610 PROTHROMBIN TIME: CPT

## 2018-09-04 PROCEDURE — 71046 X-RAY EXAM CHEST 2 VIEWS: CPT

## 2018-09-04 PROCEDURE — 36415 COLL VENOUS BLD VENIPUNCTURE: CPT

## 2018-09-04 PROCEDURE — 36416 COLLJ CAPILLARY BLOOD SPEC: CPT

## 2018-09-04 PROCEDURE — 80048 BASIC METABOLIC PNL TOTAL CA: CPT

## 2018-09-04 PROCEDURE — 99211 OFF/OP EST MAY X REQ PHY/QHP: CPT

## 2018-09-04 NOTE — PROGRESS NOTES
given    [] declined   [x] received previously   [] plans to receive at a later time   [] refused    [x] documented in [facility]

## 2018-09-05 ENCOUNTER — OFFICE VISIT (OUTPATIENT)
Dept: CARDIOTHORACIC SURGERY | Age: 68
End: 2018-09-05
Payer: MEDICARE

## 2018-09-05 VITALS
HEIGHT: 72 IN | WEIGHT: 278 LBS | HEART RATE: 82 BPM | DIASTOLIC BLOOD PRESSURE: 73 MMHG | BODY MASS INDEX: 37.65 KG/M2 | SYSTOLIC BLOOD PRESSURE: 120 MMHG

## 2018-09-05 DIAGNOSIS — Z86.79 S/P MAZE OPERATION FOR ATRIAL FIBRILLATION: Primary | ICD-10-CM

## 2018-09-05 DIAGNOSIS — Z98.890 S/P MAZE OPERATION FOR ATRIAL FIBRILLATION: Primary | ICD-10-CM

## 2018-09-05 DIAGNOSIS — J90 PLEURAL EFFUSION ON RIGHT: ICD-10-CM

## 2018-09-05 PROCEDURE — 4040F PNEUMOC VAC/ADMIN/RCVD: CPT | Performed by: THORACIC SURGERY (CARDIOTHORACIC VASCULAR SURGERY)

## 2018-09-05 PROCEDURE — 1123F ACP DISCUSS/DSCN MKR DOCD: CPT | Performed by: THORACIC SURGERY (CARDIOTHORACIC VASCULAR SURGERY)

## 2018-09-05 PROCEDURE — G8427 DOCREV CUR MEDS BY ELIG CLIN: HCPCS | Performed by: THORACIC SURGERY (CARDIOTHORACIC VASCULAR SURGERY)

## 2018-09-05 PROCEDURE — 93000 ELECTROCARDIOGRAM COMPLETE: CPT | Performed by: THORACIC SURGERY (CARDIOTHORACIC VASCULAR SURGERY)

## 2018-09-05 PROCEDURE — 99024 POSTOP FOLLOW-UP VISIT: CPT | Performed by: THORACIC SURGERY (CARDIOTHORACIC VASCULAR SURGERY)

## 2018-09-05 PROCEDURE — 1036F TOBACCO NON-USER: CPT | Performed by: THORACIC SURGERY (CARDIOTHORACIC VASCULAR SURGERY)

## 2018-09-05 PROCEDURE — 3017F COLORECTAL CA SCREEN DOC REV: CPT | Performed by: THORACIC SURGERY (CARDIOTHORACIC VASCULAR SURGERY)

## 2018-09-05 PROCEDURE — 1111F DSCHRG MED/CURRENT MED MERGE: CPT | Performed by: THORACIC SURGERY (CARDIOTHORACIC VASCULAR SURGERY)

## 2018-09-05 PROCEDURE — G8417 CALC BMI ABV UP PARAM F/U: HCPCS | Performed by: THORACIC SURGERY (CARDIOTHORACIC VASCULAR SURGERY)

## 2018-09-05 PROCEDURE — 1101F PT FALLS ASSESS-DOCD LE1/YR: CPT | Performed by: THORACIC SURGERY (CARDIOTHORACIC VASCULAR SURGERY)

## 2018-09-05 NOTE — PROGRESS NOTES
Cardiovascular Surgery Office Note      Date: 9/5/2018    Review of Systems:  Elda Mcdonnell is a 70 y.o. male  who presented for follow up from a  thoracoscopic maze procedure, with subsequent right thoracentesis for a 2-L effusion. His amiodarone was reduced to 200 mg qd two weeks ago. He reports no subjective arrhythmias, and states that his pulse during frequent home pressure checks remains in the 80s. He reports that his previous dyspnea and fatigue are improving. Physical Exam:  /73 (Site: Left Arm, Position: Sitting, Cuff Size: Large Adult)   Pulse 82   Ht 6' (1.829 m)   Wt 278 lb (126.1 kg)   BMI 37.70 kg/m²   Physical Exam    His wounds are healing well with no erythema or drainage. The sternum is stable to compression. The lungs are clear to auscultation, with decreased breath sounds right base. The chest xray shows an opacity in the right base. Assessment/Plan:  Discontinue amiodarone. Obtain non-contrast CT chest to evaluate right chest.    Discussed with patient possible need for right pleural catheter insertion and thrombolytic instillation.   RTC 2 wks      Electronically signed by Ashley Ramirez MD on 9/5/18 at 11:03 AM

## 2018-09-06 ENCOUNTER — HOSPITAL ENCOUNTER (OUTPATIENT)
Dept: CT IMAGING | Age: 68
Discharge: HOME | End: 2018-09-06
Payer: MEDICARE

## 2018-09-06 DIAGNOSIS — J90 PLEURAL EFFUSION ON RIGHT: ICD-10-CM

## 2018-09-06 PROCEDURE — 71250 CT THORAX DX C-: CPT

## 2018-09-07 ENCOUNTER — TELEPHONE (OUTPATIENT)
Dept: CARDIOTHORACIC SURGERY | Age: 68
End: 2018-09-07

## 2018-09-07 NOTE — TELEPHONE ENCOUNTER
Bautista Jones called wanting to know the results of his CT results. He stated Dr. Nola Gonzalez said he would call right away with the results and hasn't heard anything. Please advise. Thank you.

## 2018-09-10 ENCOUNTER — TELEPHONE (OUTPATIENT)
Dept: CARDIOTHORACIC SURGERY | Age: 68
End: 2018-09-10

## 2018-09-10 DIAGNOSIS — I48.91 ATRIAL FIBRILLATION, UNSPECIFIED TYPE (HCC): Primary | ICD-10-CM

## 2018-09-12 ENCOUNTER — HOSPITAL ENCOUNTER (OUTPATIENT)
Age: 68
Discharge: HOME | End: 2018-09-12
Payer: MEDICARE

## 2018-09-12 DIAGNOSIS — I48.91 ATRIAL FIBRILLATION, UNSPECIFIED TYPE (HCC): ICD-10-CM

## 2018-09-12 LAB — INR BLD: 1.52 (ref 0.85–1.13)

## 2018-09-12 PROCEDURE — 36415 COLL VENOUS BLD VENIPUNCTURE: CPT

## 2018-09-12 PROCEDURE — 85610 PROTHROMBIN TIME: CPT

## 2018-09-13 ENCOUNTER — HOSPITAL ENCOUNTER (OUTPATIENT)
Dept: ULTRASOUND IMAGING | Age: 68
Discharge: HOME | End: 2018-09-13
Payer: MEDICARE

## 2018-09-13 ENCOUNTER — HOSPITAL ENCOUNTER (OUTPATIENT)
Dept: GENERAL RADIOLOGY | Age: 68
Discharge: HOME | End: 2018-09-13
Payer: MEDICARE

## 2018-09-13 ENCOUNTER — TELEPHONE (OUTPATIENT)
Dept: PHARMACY | Age: 68
End: 2018-09-13

## 2018-09-13 DIAGNOSIS — J90 PLEURAL EFFUSION ON RIGHT: ICD-10-CM

## 2018-09-13 PROCEDURE — 32555 ASPIRATE PLEURA W/ IMAGING: CPT

## 2018-09-13 PROCEDURE — 71045 X-RAY EXAM CHEST 1 VIEW: CPT

## 2018-09-13 NOTE — BRIEF OP NOTE
Brief Postoperative Note    José Miguel Jarrett  YOB: 1950  [id number]    Pre-operative Diagnosis: Pleural effusion    Post-operative Diagnosis: Same    Procedure: US thoracentesis    Anesthesia: Local    Surgeons/Assistants: LESLIE Colbert DO    Estimated Blood Loss: less than 50     Complications: None    Specimens: Was Not Obtained    Findings: clear, yellow fluid. Full report to follow in PACS. Electronically signed by LESLIE Bennett DO on 9/13/2018 at 2:11 PM

## 2018-09-18 ENCOUNTER — HOSPITAL ENCOUNTER (OUTPATIENT)
Dept: PHARMACY | Age: 68
Setting detail: THERAPIES SERIES
Discharge: HOME | End: 2018-09-18
Payer: MEDICARE

## 2018-09-18 DIAGNOSIS — I48.21 PERMANENT ATRIAL FIBRILLATION (HCC): ICD-10-CM

## 2018-09-18 LAB — POC INR: 2.3 (ref 0.8–1.2)

## 2018-09-18 PROCEDURE — 85610 PROTHROMBIN TIME: CPT | Performed by: PHARMACIST

## 2018-09-18 PROCEDURE — 36416 COLLJ CAPILLARY BLOOD SPEC: CPT | Performed by: PHARMACIST

## 2018-09-18 PROCEDURE — 99211 OFF/OP EST MAY X REQ PHY/QHP: CPT | Performed by: PHARMACIST

## 2018-09-18 NOTE — PROGRESS NOTES
Medication Management [facility]  Anticoagulation Clinic  [phone] (phone)  [phone] (fax)      Mr. Cherre Sever is a 70 y.o.  male with history of Afib who presents today for anticoagulation monitoring and adjustment. Patient verifies current dosing regimen and tablet strength; patient states he follows the calendar at home. No missed or extra doses; patient states he was told by MD to hold Coumadin on Sept 12 in preparation for paracentesis. Patient denies s/s bleeding/bruising/swelling/SOB/chest pain  No blood in urine or stool. No dietary changes. Changes in medication/OTC agents/Herbals; Pt states that MD stopped Amiodarone on 9/5/18  No change in alcohol use or tobacco use. No change in activity level. Patient denies headaches//lightheadedness/falls; patient states he was a little dizzy due to blood pressure issues but did not fall. No vomiting/diarrhea or acute illness. No Procedures scheduled in the future at this time. Assessment:   Lab Results   Component Value Date    INR 2.30 (H) 09/18/2018    INR 1.52 (H) 09/12/2018    INR 3.80 (H) 09/04/2018     INR therapeutic   Recent Labs      09/18/18   1418   INR  2.30*         Plan:  Continue Coumadin 2.5mg MWF and 1.25mg TThSaS. Recheck INR 3 weeks. Patient reminded to call the Anticoagulation Clinic with any signs or symptoms of bleeding or with any medication changes. Patient given instructions utilizing the teach back method. Discharged ambulatory in no apparent distress. After visit summary printed and reviewed with patient.       Medications reviewed and updated on home medication list Yes    Influenza vaccine:     [] given    [x] declined   [x] received previously   [] plans to receive at a later time   [] refused    [x] documented in EPIC

## 2018-09-19 ENCOUNTER — OFFICE VISIT (OUTPATIENT)
Dept: CARDIOTHORACIC SURGERY | Age: 68
End: 2018-09-19

## 2018-09-19 ENCOUNTER — HOSPITAL ENCOUNTER (OUTPATIENT)
Age: 68
Discharge: HOME | End: 2018-09-19
Payer: MEDICARE

## 2018-09-19 ENCOUNTER — HOSPITAL ENCOUNTER (OUTPATIENT)
Dept: GENERAL RADIOLOGY | Age: 68
Discharge: HOME | End: 2018-09-19
Payer: MEDICARE

## 2018-09-19 VITALS
WEIGHT: 276.4 LBS | HEIGHT: 72 IN | DIASTOLIC BLOOD PRESSURE: 11 MMHG | SYSTOLIC BLOOD PRESSURE: 111 MMHG | BODY MASS INDEX: 37.44 KG/M2

## 2018-09-19 DIAGNOSIS — J90 PLEURAL EFFUSION ON RIGHT: ICD-10-CM

## 2018-09-19 DIAGNOSIS — I48.21 PERMANENT ATRIAL FIBRILLATION (HCC): ICD-10-CM

## 2018-09-19 DIAGNOSIS — I48.21 PERMANENT ATRIAL FIBRILLATION (HCC): Primary | ICD-10-CM

## 2018-09-19 PROCEDURE — 99024 POSTOP FOLLOW-UP VISIT: CPT | Performed by: THORACIC SURGERY (CARDIOTHORACIC VASCULAR SURGERY)

## 2018-09-19 PROCEDURE — 71046 X-RAY EXAM CHEST 2 VIEWS: CPT

## 2018-09-19 RX ORDER — BENAZEPRIL HYDROCHLORIDE 5 MG/1
5 TABLET, FILM COATED ORAL DAILY
Qty: 30 TABLET | Refills: 3 | Status: SHIPPED | OUTPATIENT
Start: 2018-09-19 | End: 2019-02-11 | Stop reason: ALTCHOICE

## 2018-09-19 RX ORDER — CARVEDILOL 3.12 MG/1
3.12 TABLET ORAL 2 TIMES DAILY WITH MEALS
Qty: 60 TABLET | Refills: 5 | Status: SHIPPED | OUTPATIENT
Start: 2018-09-19 | End: 2018-12-12 | Stop reason: DRUGHIGH

## 2018-09-26 ENCOUNTER — OFFICE VISIT (OUTPATIENT)
Dept: PULMONOLOGY | Age: 68
End: 2018-09-26
Payer: MEDICARE

## 2018-09-26 VITALS
DIASTOLIC BLOOD PRESSURE: 72 MMHG | SYSTOLIC BLOOD PRESSURE: 114 MMHG | BODY MASS INDEX: 37.93 KG/M2 | OXYGEN SATURATION: 96 % | TEMPERATURE: 97.3 F | WEIGHT: 280 LBS | HEART RATE: 67 BPM | HEIGHT: 72 IN

## 2018-09-26 DIAGNOSIS — Z98.890 HISTORY OF MAZE PROCEDURE: ICD-10-CM

## 2018-09-26 DIAGNOSIS — J90 RECURRENT PLEURAL EFFUSION ON RIGHT: Primary | ICD-10-CM

## 2018-09-26 PROCEDURE — 1123F ACP DISCUSS/DSCN MKR DOCD: CPT | Performed by: NURSE PRACTITIONER

## 2018-09-26 PROCEDURE — 1036F TOBACCO NON-USER: CPT | Performed by: NURSE PRACTITIONER

## 2018-09-26 PROCEDURE — 99213 OFFICE O/P EST LOW 20 MIN: CPT | Performed by: NURSE PRACTITIONER

## 2018-09-26 PROCEDURE — 3017F COLORECTAL CA SCREEN DOC REV: CPT | Performed by: NURSE PRACTITIONER

## 2018-09-26 PROCEDURE — G8417 CALC BMI ABV UP PARAM F/U: HCPCS | Performed by: NURSE PRACTITIONER

## 2018-09-26 PROCEDURE — G8427 DOCREV CUR MEDS BY ELIG CLIN: HCPCS | Performed by: NURSE PRACTITIONER

## 2018-09-26 PROCEDURE — 1101F PT FALLS ASSESS-DOCD LE1/YR: CPT | Performed by: NURSE PRACTITIONER

## 2018-09-26 PROCEDURE — 4040F PNEUMOC VAC/ADMIN/RCVD: CPT | Performed by: NURSE PRACTITIONER

## 2018-09-26 ASSESSMENT — ENCOUNTER SYMPTOMS
SPUTUM PRODUCTION: 0
COUGH: 0
SHORTNESS OF BREATH: 0
VOMITING: 0
DIARRHEA: 0
WHEEZING: 0
HEMOPTYSIS: 0
ABDOMINAL PAIN: 0
NAUSEA: 0
EYES NEGATIVE: 1

## 2018-09-26 NOTE — PROGRESS NOTES
[facility]    Patient: Corry Ledezma, 70 y.o.   : 1950    Pt of Dr. Erick Bear   Patient presents with    Shortness of Breath     Right Pleural effusion, hosp f/u inpt -8/15, Repeat R Lenoard Meiers 18, +CXR 18         HPI  Virginia Tamayo is here for hospital follow up for recurrent pleural effusion following MAZE procedure performed by Dr Tess Gunn MD on 2018. Had right thora 2018- removal 2L fluid, exudative of unclear etiology - cultures and cytology negative   Had right thora on 2018- removal 1.4L fluid - ordered by Dr Tess Gunn MD- no analysis of fluid sent  Reports that he was asymptomatic at time of last drainage, he was surprised at how much fluid was removed. Currently denies SOB, chest pain, ankle or leg swelling, orthopnea or cough   No PMH of COPD, asthma, or lung infection   Not on inhalers , not on home O2   Past Medical hx   PMH:  Past Medical History:   Diagnosis Date    Arthritis     [facility]     Cancer ([facility]     skin    Chronic kidney disease     Diabetes mellitus ([facility]     History of esophagogastroduodenoscopy (EGD) 16    History of kidney stones     Hyperlipidemia     Hypertension      SURGICAL HISTORY:  Past Surgical History:   Procedure Laterality Date    BACK SURGERY  2017    CARDIAC CATHETERIZATION  ??    no stents    CHOLECYSTECTOMY  ??    COLONOSCOPY      last one ??    CYSTOSCOPY  2017    KIDNEY STONE SURGERY Left 2017    basket retrievel   [facility]? ??    arthroscopy, left    LITHOTRIPSY  ?? -  2016??     kidney stones    OTHER SURGICAL HISTORY  2016    LEFT ESWL    NC OFFICE/OUTPT VISIT,PROCEDURE ONLY N/A 2018    5--BOX THORASCOPIC MAZE WITH IGGY performed by Jake Glez MD at [address] [place] [address]  ??    right    SKIN BIOPSY      URETEROSCOPY Left 2017     SOCIAL 101-120 9 units,  121 - 150 12 units,  151-200 15 units,  201 - 250 18 units,  251 -300 24 units,  301-350 30 units, 351-400 36 units.  gabapentin (NEURONTIN) 600 MG tablet Take 600 mg by mouth 3 times daily Indications: Pain       finasteride (PROSCAR) 5 MG tablet   Take 5 mg by mouth daily Prostate       No current facility-administered medications for this visit. Mickeal Rink ROS   Review of Systems   Constitutional: Negative for chills, fever, malaise/fatigue and weight loss. HENT: Negative. Eyes: Negative. Respiratory: Negative for cough, hemoptysis, sputum production, shortness of breath and wheezing. Cardiovascular: Negative for chest pain, palpitations and leg swelling. Gastrointestinal: Negative for abdominal pain, diarrhea, nausea and vomiting. Genitourinary: Negative. Musculoskeletal: Negative. Skin: Negative. Neurological: Negative. Endo/Heme/Allergies: Does not bruise/bleed easily. Psychiatric/Behavioral: Negative for depression and suicidal ideas. Physical exam   /72   Pulse 67   Temp 97.3 °F (36.3 °C) (Oral)   Ht 6' (1.829 m)   Wt 280 lb (127 kg)   SpO2 96% Comment: RA at rest  BMI 37.97 kg/m²        Physical Exam   Constitutional: He is oriented to person, place, and time and well-developed, well-nourished, and in no distress. HENT:   Head: Normocephalic and atraumatic. Mouth/Throat: Oropharynx is clear and moist.   Eyes: Pupils are equal, round, and reactive to light. Neck: Neck supple. No JVD present. No tracheal deviation present. Cardiovascular: Normal rate and regular rhythm. No murmur heard. Pulmonary/Chest: Effort normal. No respiratory distress. He has no wheezes. He has no rales. He exhibits no tenderness. Abdominal: Soft. Bowel sounds are normal. He exhibits no distension. There is no tenderness. Musculoskeletal: He exhibits no edema. Neurological: He is alert and oriented to person, place, and time.    Skin: Skin is warm and dry. Psychiatric: Mood and affect normal.   Nursing note and vitals reviewed. Test results   Lung Nodule Screening     [] Qualifies    [x] Does not qualify   [] Declined    [] Completed - never smoked     CXR 9/19/2018- s/p thoracentesis on right 9/13/18       Impression   1. There is a stable small right pleural effusion with stable mild pleural reaction also tracking along the lateral margin of the right mid to lower chest. There is no new consolidation. There is no pulmonary vascular congestion.            Assessment      Diagnosis Orders   1. Recurrent pleural effusion on right     2. History of maze procedure           Plan   -Currently following with Dr Shaka Hernandez, had recent CXR/ CT ordered by Dr Shaka Hernandez with follow up scheduled Oct 17, 2018.    -Last CXR does show small increase in effusion on right from 9/13- 9/19.  -No further imaging is ordered at this time   -Would recommend a repeat CXR prior to visist with Dr Shaka Hernandez in Oct and if recurrent pl effusion consider pleurex catheter vs. Surgical intervention per discretion  CT surgery if they feel indicated.    -I will reach out to Dr Blane Daugherty office to ensure they are still following the patient for the pl effusion or if we should order the CXR to follow     Will see Germaine esquivel PRN for now- I will call patient with further instruction after contacting CT surgery office     Electronically signed by RAMIREZ Petty CNP on 9/26/2018 at 12:17 PM  9/26/2018

## 2018-10-09 ENCOUNTER — HOSPITAL ENCOUNTER (OUTPATIENT)
Dept: PHARMACY | Age: 68
Setting detail: THERAPIES SERIES
Discharge: HOME | End: 2018-10-09
Payer: MEDICARE

## 2018-10-09 DIAGNOSIS — I48.21 PERMANENT ATRIAL FIBRILLATION (HCC): ICD-10-CM

## 2018-10-09 LAB — POC INR: 1.4 (ref 0.8–1.2)

## 2018-10-09 PROCEDURE — 36416 COLLJ CAPILLARY BLOOD SPEC: CPT

## 2018-10-09 PROCEDURE — 99211 OFF/OP EST MAY X REQ PHY/QHP: CPT

## 2018-10-09 PROCEDURE — 85610 PROTHROMBIN TIME: CPT

## 2018-10-09 NOTE — PROGRESS NOTES
Medication Management [facility]  Anticoagulation Clinic  [phone] (phone)  [phone] (fax)      Mr. Seth Roldan is a 70 y.o.  male with history of persistent atrial fib. , per Dr. Jesus Block referral, who presents today for Warfarin monitoring and adjustment (3 week visit). Patient verifies current tablet strength. Uses pill box. Followed printed instructions for dose. No missed or extra doses. Patient denies bleeding/bruising/swelling/SOB/chest pain. No blood in urine or stool. No dietary changes. Denies having green tea/V8 juice/Boost/Ensure/Glucerna. Changes in medication/OTC agents/herbals: noted Coreg was decreased and Lotensin added. Amiodarone was stopped 9/5. No change in alcohol use or tobacco use. Change in activity level: increased - started exercise program; hopes to get more active yet. Patient denies headaches/dizziness/lightheadedness/falls. No vomiting/diarrhea or acute illness. No procedures scheduled in the future at this time. Assessment:   Lab Results   Component Value Date    INR 1.40 (H) 10/09/2018    INR 2.30 (H) 09/18/2018    INR 1.52 (H) 09/12/2018     INR subtherapeutic - goal 2-3. Recent Labs      10/09/18   1445   INR  1.40*       Plan:  POCT INR ordered/performed/result reviewed. Increase PO Coumadin to 1.25 mg MF, 2.5 mg TWThSS (from 2.5 mg MWF, 1.25 mg TThSS=20% change). Recheck INR in 1.5-2 weeks. (Report given - orders entered by VIVIANA Payton, PharmD.)  Patient reminded to call the Anticoagulation Clinic with signs or symptoms of bleeding or with any medication changes. Patient given instructions utilizing the teach back method. Discharged ambulatory in no apparent distress. After visit summary printed and reviewed with patient.       Medications reviewed and updated on home medication list.  Influenza vaccine:     [] given    [] declined   [] received previously   [] plans to receive at a later time   []

## 2018-10-12 ENCOUNTER — HOSPITAL ENCOUNTER (OUTPATIENT)
Age: 68
Discharge: HOME | End: 2018-10-12
Payer: MEDICARE

## 2018-10-12 ENCOUNTER — HOSPITAL ENCOUNTER (OUTPATIENT)
Dept: GENERAL RADIOLOGY | Age: 68
Discharge: HOME | End: 2018-10-12
Payer: MEDICARE

## 2018-10-12 DIAGNOSIS — J90 PLEURAL EFFUSION ON RIGHT: ICD-10-CM

## 2018-10-12 PROCEDURE — 71046 X-RAY EXAM CHEST 2 VIEWS: CPT

## 2018-10-17 ENCOUNTER — OFFICE VISIT (OUTPATIENT)
Dept: CARDIOTHORACIC SURGERY | Age: 68
End: 2018-10-17
Payer: MEDICARE

## 2018-10-17 VITALS
WEIGHT: 285.8 LBS | SYSTOLIC BLOOD PRESSURE: 132 MMHG | BODY MASS INDEX: 38.71 KG/M2 | DIASTOLIC BLOOD PRESSURE: 67 MMHG | HEIGHT: 72 IN | HEART RATE: 67 BPM

## 2018-10-17 DIAGNOSIS — Z86.79 S/P MAZE OPERATION FOR ATRIAL FIBRILLATION: Primary | ICD-10-CM

## 2018-10-17 DIAGNOSIS — Z98.890 S/P MAZE OPERATION FOR ATRIAL FIBRILLATION: Primary | ICD-10-CM

## 2018-10-17 PROCEDURE — 3017F COLORECTAL CA SCREEN DOC REV: CPT | Performed by: THORACIC SURGERY (CARDIOTHORACIC VASCULAR SURGERY)

## 2018-10-17 PROCEDURE — G8427 DOCREV CUR MEDS BY ELIG CLIN: HCPCS | Performed by: THORACIC SURGERY (CARDIOTHORACIC VASCULAR SURGERY)

## 2018-10-17 PROCEDURE — G8417 CALC BMI ABV UP PARAM F/U: HCPCS | Performed by: THORACIC SURGERY (CARDIOTHORACIC VASCULAR SURGERY)

## 2018-10-17 PROCEDURE — 99211 OFF/OP EST MAY X REQ PHY/QHP: CPT | Performed by: THORACIC SURGERY (CARDIOTHORACIC VASCULAR SURGERY)

## 2018-10-23 ENCOUNTER — HOSPITAL ENCOUNTER (OUTPATIENT)
Dept: PHARMACY | Age: 68
Setting detail: THERAPIES SERIES
Discharge: HOME | End: 2018-10-23
Payer: MEDICARE

## 2018-10-23 DIAGNOSIS — I48.21 PERMANENT ATRIAL FIBRILLATION (HCC): ICD-10-CM

## 2018-10-23 LAB — POC INR: 2 (ref 0.8–1.2)

## 2018-10-23 PROCEDURE — 36416 COLLJ CAPILLARY BLOOD SPEC: CPT

## 2018-10-23 PROCEDURE — 85610 PROTHROMBIN TIME: CPT

## 2018-10-23 PROCEDURE — 99211 OFF/OP EST MAY X REQ PHY/QHP: CPT

## 2018-10-30 RX ORDER — ATORVASTATIN CALCIUM 20 MG/1
20 TABLET, FILM COATED ORAL NIGHTLY
Qty: 30 TABLET | Refills: 3 | Status: SHIPPED | OUTPATIENT
Start: 2018-10-30 | End: 2019-02-11 | Stop reason: ALTCHOICE

## 2018-11-13 ENCOUNTER — TELEPHONE (OUTPATIENT)
Dept: PHARMACY | Age: 68
End: 2018-11-13

## 2018-11-13 NOTE — LETTER
[facility]  [address] [address]  Phone: [phone]  Fax: [phone]    Mona Bauer [facility]        November 13, 2018      RE: Leilani Daniels, [place] 1950    Dear Dr. Sathish Newman: This letter is to inform you that your patient, Leilani Daniels, has been discharged from the Coumadin clinic at [address] [place] [address] for the following reason: Coumadin therapy discontinued by Dr. Gloriann Lanes.                  Sincerely,        Mona Bauer [facility]

## 2018-12-12 ENCOUNTER — OFFICE VISIT (OUTPATIENT)
Dept: CARDIOLOGY CLINIC | Age: 68
End: 2018-12-12
Payer: MEDICARE

## 2018-12-12 VITALS
HEART RATE: 66 BPM | SYSTOLIC BLOOD PRESSURE: 100 MMHG | HEIGHT: 72 IN | BODY MASS INDEX: 38.71 KG/M2 | DIASTOLIC BLOOD PRESSURE: 62 MMHG | WEIGHT: 285.8 LBS

## 2018-12-12 DIAGNOSIS — I50.32 CHRONIC DIASTOLIC HEART FAILURE (HCC): ICD-10-CM

## 2018-12-12 DIAGNOSIS — I48.20 ATRIAL FIBRILLATION, CHRONIC (HCC): Primary | ICD-10-CM

## 2018-12-12 PROCEDURE — 4040F PNEUMOC VAC/ADMIN/RCVD: CPT | Performed by: INTERNAL MEDICINE

## 2018-12-12 PROCEDURE — G8484 FLU IMMUNIZE NO ADMIN: HCPCS | Performed by: INTERNAL MEDICINE

## 2018-12-12 PROCEDURE — G8428 CUR MEDS NOT DOCUMENT: HCPCS | Performed by: INTERNAL MEDICINE

## 2018-12-12 PROCEDURE — 1101F PT FALLS ASSESS-DOCD LE1/YR: CPT | Performed by: INTERNAL MEDICINE

## 2018-12-12 PROCEDURE — 1036F TOBACCO NON-USER: CPT | Performed by: INTERNAL MEDICINE

## 2018-12-12 PROCEDURE — 99213 OFFICE O/P EST LOW 20 MIN: CPT | Performed by: INTERNAL MEDICINE

## 2018-12-12 PROCEDURE — 3017F COLORECTAL CA SCREEN DOC REV: CPT | Performed by: INTERNAL MEDICINE

## 2018-12-12 PROCEDURE — G8417 CALC BMI ABV UP PARAM F/U: HCPCS | Performed by: INTERNAL MEDICINE

## 2018-12-12 PROCEDURE — 1123F ACP DISCUSS/DSCN MKR DOCD: CPT | Performed by: INTERNAL MEDICINE

## 2018-12-12 RX ORDER — CARVEDILOL 6.25 MG/1
6.25 TABLET ORAL 2 TIMES DAILY WITH MEALS
COMMUNITY
End: 2021-02-11

## 2018-12-12 RX ORDER — ROSUVASTATIN CALCIUM 20 MG/1
20 TABLET, COATED ORAL DAILY
COMMUNITY
End: 2019-01-16 | Stop reason: SINTOL

## 2018-12-12 RX ORDER — FUROSEMIDE 20 MG/1
20 TABLET ORAL DAILY
Qty: 60 TABLET | Refills: 0 | Status: SHIPPED | OUTPATIENT
Start: 2018-12-12

## 2018-12-12 NOTE — PROGRESS NOTES
aspart (NOVOLOG) 100 UNIT/ML injection vial, Inject into the skin 4 times daily Glucose  6 units,  101-120 9 units,  121 - 150 12 units,  151-200 15 units,  201 - 250 18 units,  251 -300 24 units,  301-350 30 units, 351-400 36 units. , Disp: , Rfl:     gabapentin (NEURONTIN) 600 MG tablet, Take 600 mg by mouth 3 times daily Indications: Pain , Disp: , Rfl:     finasteride (PROSCAR) 5 MG tablet,  Take 5 mg by mouth daily Prostate, Disp: , Rfl:     atorvastatin (LIPITOR) 20 MG tablet, TAKE 1 TABLET BY MOUTH NIGHTLY, Disp: 30 tablet, Rfl: 3    aspirin 325 MG EC tablet, Take 1 tablet by mouth daily, Disp: 30 tablet, Rfl: 3    Past Medical History  Marilyn Mayen  has a past medical history of Arthritis; Atrial fibrillation ([facility] [address] Cancer ([facility] [address] [facility] Chronic kidney disease; Diabetes mellitus ([facility] [address] [facility] History of esophagogastroduodenoscopy (EGD); History of kidney stones; Hyperlipidemia; and Hypertension. Social History  Marilyn Mayen  reports that he has never smoked. He has never used smokeless tobacco. He reports that he does not drink alcohol or use drugs. Family History  Marilyn Mayen family history includes Arthritis in his mother; Diabetes in his father and mother; Heart Disease in his mother; High Blood Pressure in his father and mother; Stroke in his mother. There is no family history of bicuspid aortic valve, aneurysms, heart transplant, pacemakers, defibrillators, or sudden cardiac death. Past Surgical History   Past Surgical History:   Procedure Laterality Date    BACK SURGERY  05/2017    CARDIAC CATHETERIZATION  1995??    no stents    CHOLECYSTECTOMY  1984??    COLONOSCOPY      last one 2015??    CYSTOSCOPY  05/02/2017    KIDNEY STONE SURGERY Left 05/02/2017    basket retrievel   [facility]? ??    arthroscopy, left    LITHOTRIPSY  1990?? -  2016??     kidney stones    OTHER SURGICAL HISTORY  5/26/2016    LEFT ESWL    MD OFFICE/OUTPT VISIT,PROCEDURE ONLY N/A 7/12/2018    [facility]. Coordination normal.   Skin: Skin is warm and dry. Psychiatric: Normal mood and affect.        No results found for: CKTOTAL, CKMB, CKMBINDEX    Lab Results   Component Value Date    WBC 11.3 08/15/2018    RBC 4.31 08/15/2018    HGB 12.2 08/15/2018    HCT 37.6 08/15/2018    MCV 87.2 08/15/2018    MCH 28.3 08/15/2018    MCHC 32.4 08/15/2018    RDW 14.7 2018     08/15/2018    MPV 10.7 08/15/2018       Lab Results   Component Value Date     2018    K 3.7 2018    K 4.4 08/15/2018     2018    CO2 27 2018    BUN 9 2018    LABALBU 3.4 2018    CREATININE 1.0 2018    CALCIUM 9.3 2018    LABGLOM 74 2018    GLUCOSE 77 2018       Lab Results   Component Value Date    ALKPHOS 91 2018    ALT 7 2018    AST 14 2018    PROT 7.8 2018    BILITOT 0.6 2018    BILIDIR <0.2 2018    LABALBU 3.4 2018       Lab Results   Component Value Date    MG 1.9 2018       Lab Results   Component Value Date    INR 2.00 (H) 10/23/2018    INR 1.40 (H) 10/09/2018    INR 2.30 (H) 2018         Lab Results   Component Value Date    LABA1C 7.1 2018       Lab Results   Component Value Date    TRIG 216 2018    HDL 37 2018    LDLCALC 70 2018    LDLDIRECT 70 2014       No results found for: TSH      Testing Reviewed:      I have individually reviewed the cardiac test below:    ECHO:   Results for orders placed during the hospital encounter of 13   Echocardiogram 2D/ M-Mode/ Colorflow/ Do    Narrative [facility]  Phone [phone] [place] [phone]  QUETA [place]TTO [address]JOSE ANTONIO, [address]  [address]    Recognized by the Blane Avila [address] [place] [address]) as an [address] in Echocardiography  Transthoracic Echocardiogram  2D, M-mode, Doppler, and Color Doppler    Name: Melissa Ramos  : [id number]  MR #: [id number]  Study date: 2013  Gender: normal. There was normal leaflet  separation. DOPPLER: The transtricuspid velocity was within the normal range. There was no evidence for stenosis. There was mild regurgitation. RIGHT ATRIUM: Size was normal.    SYSTEMIC VEINS: IVC: The inferior vena cava was normal in size. PERICARDIUM: There was no pericardial effusion. The pericardium was normal in  appearance. SYSTEM MEASUREMENT TABLES    2D mode  FS (2D-Cubed): 23 %  FS (2D-Teich): 23 %  IVSd (2D): 1.2 cm  IVSd; Mean chosen (2D): 1.2 cm  LVIDd (2D): 4.7 cm  LVIDs (2D): 3.6 cm  LVOT Area (2D): 3.8 cm2  LVPWd (2D): 1.2 cm  RVIDd (2D): 3 cm    M mode  AoR Diam (MM): 3 cm  AV Cusp Sep (MM): 2.3 cm    Unspecified Scan Mode  VTI; Antegrade Flow: 21.5 cm  VTI; Mean; Antegrade Flow: 21.5 cm  LVOT Diam: 2.2 cm  LVOT Vmax: 73.7 cm/s  Vmax; Antegrade Flow: 72.1 cm/s    SUMMARY:    Left ventricle:  Size was normal.  Systolic function was normal. Ejection fraction was estimated in the range of  50 % to 55 %. There were no regional wall motion abnormalities. Wall thickness was mildly increased. Concentric hypertrophy was present. Right ventricle: The ventricle was mildly dilated. Tricuspid valve: There was mild regurgitation. Prepared and signed by    Flor Caballero DO  Signed 13-Nov-2013 18:34:33          Assessment/Plan   HTN - intermittent orthostasis, well controlled currently, cut Lasix to 20 mg q day once a day. Afib s/p 5 Box Maze and CARLTON clip - follow with CT surgery, no current OAC, no symptoms    Discussed diet/exercise/BP/weight loss/health lifestyle choices/lipids; the patient understands the goals and will try to comply.     Disposition:  6-12 months         Electronically signed by Sakshi Tse MD   12/12/2018 at 9:01 AM

## 2019-01-16 ENCOUNTER — OFFICE VISIT (OUTPATIENT)
Dept: CARDIOTHORACIC SURGERY | Age: 69
End: 2019-01-16
Payer: MEDICARE

## 2019-01-16 VITALS
DIASTOLIC BLOOD PRESSURE: 72 MMHG | HEIGHT: 72 IN | SYSTOLIC BLOOD PRESSURE: 142 MMHG | BODY MASS INDEX: 39.6 KG/M2 | HEART RATE: 63 BPM | WEIGHT: 292.4 LBS

## 2019-01-16 DIAGNOSIS — Z86.79 S/P MAZE OPERATION FOR ATRIAL FIBRILLATION: Primary | ICD-10-CM

## 2019-01-16 DIAGNOSIS — Z98.890 S/P MAZE OPERATION FOR ATRIAL FIBRILLATION: Primary | ICD-10-CM

## 2019-01-16 PROCEDURE — G8417 CALC BMI ABV UP PARAM F/U: HCPCS | Performed by: THORACIC SURGERY (CARDIOTHORACIC VASCULAR SURGERY)

## 2019-01-16 PROCEDURE — 99211 OFF/OP EST MAY X REQ PHY/QHP: CPT | Performed by: THORACIC SURGERY (CARDIOTHORACIC VASCULAR SURGERY)

## 2019-01-16 PROCEDURE — 3017F COLORECTAL CA SCREEN DOC REV: CPT | Performed by: THORACIC SURGERY (CARDIOTHORACIC VASCULAR SURGERY)

## 2019-01-16 PROCEDURE — G8427 DOCREV CUR MEDS BY ELIG CLIN: HCPCS | Performed by: THORACIC SURGERY (CARDIOTHORACIC VASCULAR SURGERY)

## 2019-01-16 RX ORDER — LOVASTATIN 20 MG/1
20 TABLET ORAL NIGHTLY
Qty: 90 TABLET | Refills: 3 | Status: SHIPPED | OUTPATIENT
Start: 2019-01-16

## 2019-01-16 RX ORDER — LOSARTAN POTASSIUM 25 MG/1
25 TABLET ORAL DAILY
COMMUNITY
End: 2019-02-11 | Stop reason: ALTCHOICE

## 2019-02-07 ENCOUNTER — TELEPHONE (OUTPATIENT)
Dept: CARDIOTHORACIC SURGERY | Age: 69
End: 2019-02-07

## 2019-02-08 ENCOUNTER — TELEPHONE (OUTPATIENT)
Dept: CARDIOTHORACIC SURGERY | Age: 69
End: 2019-02-08

## 2019-02-11 ENCOUNTER — OFFICE VISIT (OUTPATIENT)
Dept: CARDIOLOGY CLINIC | Age: 69
End: 2019-02-11
Payer: MEDICARE

## 2019-02-11 VITALS
SYSTOLIC BLOOD PRESSURE: 131 MMHG | DIASTOLIC BLOOD PRESSURE: 66 MMHG | WEIGHT: 297 LBS | BODY MASS INDEX: 40.23 KG/M2 | HEIGHT: 72 IN | HEART RATE: 55 BPM

## 2019-02-11 DIAGNOSIS — I10 ESSENTIAL HYPERTENSION, BENIGN: Primary | ICD-10-CM

## 2019-02-11 DIAGNOSIS — I48.0 PAF (PAROXYSMAL ATRIAL FIBRILLATION) (HCC): ICD-10-CM

## 2019-02-11 PROCEDURE — 1036F TOBACCO NON-USER: CPT | Performed by: PHYSICIAN ASSISTANT

## 2019-02-11 PROCEDURE — 1101F PT FALLS ASSESS-DOCD LE1/YR: CPT | Performed by: PHYSICIAN ASSISTANT

## 2019-02-11 PROCEDURE — 3017F COLORECTAL CA SCREEN DOC REV: CPT | Performed by: PHYSICIAN ASSISTANT

## 2019-02-11 PROCEDURE — G8484 FLU IMMUNIZE NO ADMIN: HCPCS | Performed by: PHYSICIAN ASSISTANT

## 2019-02-11 PROCEDURE — 99213 OFFICE O/P EST LOW 20 MIN: CPT | Performed by: PHYSICIAN ASSISTANT

## 2019-02-11 PROCEDURE — G8417 CALC BMI ABV UP PARAM F/U: HCPCS | Performed by: PHYSICIAN ASSISTANT

## 2019-02-11 PROCEDURE — G8427 DOCREV CUR MEDS BY ELIG CLIN: HCPCS | Performed by: PHYSICIAN ASSISTANT

## 2019-02-11 PROCEDURE — 4040F PNEUMOC VAC/ADMIN/RCVD: CPT | Performed by: PHYSICIAN ASSISTANT

## 2019-02-11 PROCEDURE — 1123F ACP DISCUSS/DSCN MKR DOCD: CPT | Performed by: PHYSICIAN ASSISTANT

## 2019-02-11 RX ORDER — LOSARTAN POTASSIUM 50 MG/1
50 TABLET ORAL DAILY
Qty: 30 TABLET | Refills: 5 | Status: SHIPPED | OUTPATIENT
Start: 2019-02-11 | End: 2019-02-19 | Stop reason: DRUGHIGH

## 2019-02-19 RX ORDER — LOSARTAN POTASSIUM 100 MG/1
100 TABLET ORAL DAILY
COMMUNITY
End: 2019-02-19 | Stop reason: SDUPTHER

## 2019-02-19 RX ORDER — LOSARTAN POTASSIUM 100 MG/1
100 TABLET ORAL DAILY
Qty: 30 TABLET | Refills: 5 | Status: SHIPPED | OUTPATIENT
Start: 2019-02-19 | End: 2021-02-11

## 2019-02-28 RX ORDER — DILTIAZEM HYDROCHLORIDE 120 MG/1
120 CAPSULE, COATED, EXTENDED RELEASE ORAL DAILY
COMMUNITY
End: 2019-02-28 | Stop reason: SDUPTHER

## 2019-02-28 RX ORDER — DILTIAZEM HYDROCHLORIDE 120 MG/1
120 CAPSULE, COATED, EXTENDED RELEASE ORAL DAILY
Qty: 90 CAPSULE | Refills: 1 | Status: SHIPPED | OUTPATIENT
Start: 2019-02-28 | End: 2019-09-09 | Stop reason: SDUPTHER

## 2019-03-19 ENCOUNTER — HOSPITAL ENCOUNTER (OUTPATIENT)
Age: 69
Discharge: HOME | End: 2019-03-19
Payer: MEDICARE

## 2019-03-19 ENCOUNTER — HOSPITAL ENCOUNTER (OUTPATIENT)
Dept: GENERAL RADIOLOGY | Age: 69
Discharge: HOME | End: 2019-03-19
Payer: MEDICARE

## 2019-03-19 DIAGNOSIS — R05.9 COUGH: ICD-10-CM

## 2019-03-19 LAB
ALBUMIN SERPL-MCNC: 3.7 G/DL (ref 3.5–5.1)
ALP BLD-CCNC: 111 U/L (ref 38–126)
ALT SERPL-CCNC: 16 U/L (ref 11–66)
ANION GAP SERPL CALCULATED.3IONS-SCNC: 13 MEQ/L (ref 8–16)
AST SERPL-CCNC: 19 U/L (ref 5–40)
BASOPHILS # BLD: 0.6 %
BASOPHILS ABSOLUTE: 0 THOU/MM3 (ref 0–0.1)
BILIRUB SERPL-MCNC: 0.8 MG/DL (ref 0.3–1.2)
BILIRUBIN DIRECT: < 0.2 MG/DL (ref 0–0.3)
BUN BLDV-MCNC: 18 MG/DL (ref 7–22)
CALCIUM SERPL-MCNC: 9.4 MG/DL (ref 8.5–10.5)
CHLORIDE BLD-SCNC: 106 MEQ/L (ref 98–111)
CHOLESTEROL, TOTAL: 96 MG/DL (ref 100–199)
CO2: 25 MEQ/L (ref 23–33)
CREAT SERPL-MCNC: 1 MG/DL (ref 0.4–1.2)
EOSINOPHIL # BLD: 2.9 %
EOSINOPHILS ABSOLUTE: 0.2 THOU/MM3 (ref 0–0.4)
ERYTHROCYTE [DISTWIDTH] IN BLOOD BY AUTOMATED COUNT: 14.3 % (ref 11.5–14.5)
ERYTHROCYTE [DISTWIDTH] IN BLOOD BY AUTOMATED COUNT: 46.8 FL (ref 35–45)
GFR SERPL CREATININE-BSD FRML MDRD: 74 ML/MIN/1.73M2
GLUCOSE BLD-MCNC: 145 MG/DL (ref 70–108)
HCT VFR BLD CALC: 40.6 % (ref 42–52)
HDLC SERPL-MCNC: 49 MG/DL
HEMOGLOBIN: 12.8 GM/DL (ref 14–18)
HEPATITIS C ANTIBODY: NEGATIVE
IMMATURE GRANS (ABS): 0.02 THOU/MM3 (ref 0–0.07)
IMMATURE GRANULOCYTES: 0.3 %
LDL CHOLESTEROL CALCULATED: 36 MG/DL
LYMPHOCYTES # BLD: 26.3 %
LYMPHOCYTES ABSOLUTE: 1.9 THOU/MM3 (ref 1–4.8)
MCH RBC QN AUTO: 28.4 PG (ref 26–33)
MCHC RBC AUTO-ENTMCNC: 31.5 GM/DL (ref 32.2–35.5)
MCV RBC AUTO: 90 FL (ref 80–94)
MONOCYTES # BLD: 8.8 %
MONOCYTES ABSOLUTE: 0.6 THOU/MM3 (ref 0.4–1.3)
NUCLEATED RED BLOOD CELLS: 0 /100 WBC
PLATELET # BLD: 201 THOU/MM3 (ref 130–400)
PMV BLD AUTO: 11.3 FL (ref 9.4–12.4)
POTASSIUM SERPL-SCNC: 4.8 MEQ/L (ref 3.5–5.2)
RBC # BLD: 4.51 MILL/MM3 (ref 4.7–6.1)
SEG NEUTROPHILS: 61.1 %
SEGMENTED NEUTROPHILS ABSOLUTE COUNT: 4.5 THOU/MM3 (ref 1.8–7.7)
SODIUM BLD-SCNC: 144 MEQ/L (ref 135–145)
TOTAL PROTEIN: 7.2 G/DL (ref 6.1–8)
TRIGL SERPL-MCNC: 55 MG/DL (ref 0–199)
WBC # BLD: 7.3 THOU/MM3 (ref 4.8–10.8)

## 2019-03-19 PROCEDURE — 86803 HEPATITIS C AB TEST: CPT

## 2019-03-19 PROCEDURE — 80053 COMPREHEN METABOLIC PANEL: CPT

## 2019-03-19 PROCEDURE — 36415 COLL VENOUS BLD VENIPUNCTURE: CPT

## 2019-03-19 PROCEDURE — 71046 X-RAY EXAM CHEST 2 VIEWS: CPT

## 2019-03-19 PROCEDURE — 85025 COMPLETE CBC W/AUTO DIFF WBC: CPT

## 2019-03-19 PROCEDURE — 80061 LIPID PANEL: CPT

## 2019-03-19 PROCEDURE — 82248 BILIRUBIN DIRECT: CPT

## 2019-03-26 ENCOUNTER — HOSPITAL ENCOUNTER (OUTPATIENT)
Dept: CT IMAGING | Age: 69
Discharge: HOME | End: 2019-03-26
Payer: MEDICARE

## 2019-03-26 ENCOUNTER — HOSPITAL ENCOUNTER (OUTPATIENT)
Age: 69
Discharge: HOME | End: 2019-03-26
Payer: MEDICARE

## 2019-03-26 DIAGNOSIS — R93.89 ABNORMAL CXR: ICD-10-CM

## 2019-03-26 PROCEDURE — 71260 CT THORAX DX C+: CPT

## 2019-03-26 PROCEDURE — 6360000004 HC RX CONTRAST MEDICATION: Performed by: FAMILY MEDICINE

## 2019-03-26 RX ADMIN — IOPAMIDOL 85 ML: 755 INJECTION, SOLUTION INTRAVENOUS at 07:33

## 2019-04-05 ENCOUNTER — TELEPHONE (OUTPATIENT)
Dept: CARDIOLOGY CLINIC | Age: 69
End: 2019-04-05

## 2019-04-05 NOTE — TELEPHONE ENCOUNTER
Pt states he had a lung bx at [facility] today, they think he might be back in afib. He is coughing up blood, but they state  He might do that for a few days. appt made to see you monday, should we do anything  Before that?

## 2019-04-08 ENCOUNTER — OFFICE VISIT (OUTPATIENT)
Dept: CARDIOLOGY CLINIC | Age: 69
End: 2019-04-08
Payer: MEDICARE

## 2019-04-08 ENCOUNTER — TELEPHONE (OUTPATIENT)
Dept: CARDIOLOGY CLINIC | Age: 69
End: 2019-04-08

## 2019-04-08 VITALS
HEIGHT: 72 IN | BODY MASS INDEX: 39.6 KG/M2 | HEART RATE: 88 BPM | DIASTOLIC BLOOD PRESSURE: 68 MMHG | SYSTOLIC BLOOD PRESSURE: 136 MMHG | WEIGHT: 292.4 LBS

## 2019-04-08 DIAGNOSIS — I48.0 PAF (PAROXYSMAL ATRIAL FIBRILLATION) (HCC): Primary | ICD-10-CM

## 2019-04-08 DIAGNOSIS — I10 ESSENTIAL HYPERTENSION, BENIGN: ICD-10-CM

## 2019-04-08 DIAGNOSIS — Z98.890 H/O CARDIAC RADIOFREQUENCY ABLATION: ICD-10-CM

## 2019-04-08 PROCEDURE — 1123F ACP DISCUSS/DSCN MKR DOCD: CPT | Performed by: PHYSICIAN ASSISTANT

## 2019-04-08 PROCEDURE — 3017F COLORECTAL CA SCREEN DOC REV: CPT | Performed by: PHYSICIAN ASSISTANT

## 2019-04-08 PROCEDURE — 93000 ELECTROCARDIOGRAM COMPLETE: CPT | Performed by: PHYSICIAN ASSISTANT

## 2019-04-08 PROCEDURE — G8417 CALC BMI ABV UP PARAM F/U: HCPCS | Performed by: PHYSICIAN ASSISTANT

## 2019-04-08 PROCEDURE — 1036F TOBACCO NON-USER: CPT | Performed by: PHYSICIAN ASSISTANT

## 2019-04-08 PROCEDURE — 4040F PNEUMOC VAC/ADMIN/RCVD: CPT | Performed by: PHYSICIAN ASSISTANT

## 2019-04-08 PROCEDURE — G8427 DOCREV CUR MEDS BY ELIG CLIN: HCPCS | Performed by: PHYSICIAN ASSISTANT

## 2019-04-08 PROCEDURE — 99213 OFFICE O/P EST LOW 20 MIN: CPT | Performed by: PHYSICIAN ASSISTANT

## 2019-04-08 RX ORDER — FLUTICASONE FUROATE AND VILANTEROL 100; 25 UG/1; UG/1
POWDER RESPIRATORY (INHALATION) DAILY
COMMUNITY
End: 2019-12-10

## 2019-04-08 NOTE — PROGRESS NOTES
Patient here for fu from lung biopsy. Pt states that LM said he may be in afib, but pt states he doesn't believe he is in it anymore. Patient denies SOB, chest pain, dizziness, lightheadedness, palpations and JULIANA. [facility]  [employer] [facility] [facility]  [facility] [address] [facility] [place] [address]   Dept: [phone]   Dept Fax: [phone]   Loc: [phone]      Chief Complaint   Patient presents with   Raghu Wasserman Atrial Fibrillation     Patient presents for follow-up appointment after recent lung biopsy and possible atrial fibrillation. Patient states he recently had a lung biopsy done at [facility].  He was told that he was in A. Fib. He has a history of paroxysmal mitral fibrillation and underwent 5 box ablation last year. As far as he is aware of these remained in normal sinus rhythm. He denies any chest pain, shortness of breath or palpitations.   Cardiologist:  Dr. Matt Stovall:   No fever, no chills, No fatigue or weight loss  Pulmonary:    No dyspnea, no wheezing  Cardiac:    Denies recent chest pain   GI:     No nausea or vomiting, no abdominal pain  Neuro:    No dizziness or light headedness  Musculoskeletal:  No recent active issues  Extremities:   No edema, good peripheral pulses      Past Medical History:   Diagnosis Date    Arthritis     Atrial fibrillation ([place] [address]     Cancer ([facility]     skin    Chronic kidney disease     Diabetes mellitus ([facility]     History of esophagogastroduodenoscopy (EGD) 6/7/16    History of kidney stones     Hyperlipidemia     Hypertension        Allergies   Allergen Reactions    Sulfa Antibiotics Rash       Current Outpatient Medications   Medication Sig Dispense Refill    fluticasone-vilanterol (BREO ELLIPTA) 100-25 MCG/INH AEPB inhaler Inhale into the lungs daily      diltiazem (CARDIZEM CD) 120 MG extended release capsule Take 1 capsule by mouth daily 90 capsule 1    losartan (COZAAR) 100 MG tablet Take 1 tablet by mouth daily 30 tablet 5    lovastatin (MEVACOR) 20 MG tablet Take 1 tablet by mouth nightly 90 tablet 3    carvedilol (COREG) 6.25 MG tablet Take 6.25 mg by mouth 2 times daily (with meals)      furosemide (LASIX) 20 MG tablet Take 1 tablet by mouth daily 60 tablet 0    ipratropium (ATROVENT) 0.06 % nasal spray 2 sprays by Nasal route daily       aspirin 325 MG EC tablet Take 1 tablet by mouth daily 30 tablet 3    famotidine (PEPCID) 20 MG tablet Take 1 tablet by mouth 2 times daily 60 tablet 3    NONFORMULARY Take 1 tablet by mouth 2 times daily Milk Thistle      NONFORMULARY Take 1 capsule by mouth 2 times daily Equate Digestive care probiotic      docusate sodium (COLACE) 250 MG capsule Take 250 mg by mouth nightly       tamsulosin (FLOMAX) 0.4 MG capsule take 1 capsule by mouth once daily (Patient taking differently: take 1 capsule by mouth once nightly) 90 capsule 3    insulin NPH (HUMULIN N;NOVOLIN N) 100 UNIT/ML injection vial Inject 40 Units into the skin 2 times daily Lunch and bedtime      insulin aspart (NOVOLOG) 100 UNIT/ML injection vial Inject into the skin 4 times daily Glucose  6 units,  101-120 9 units,  121 - 150 12 units,  151-200 15 units,  201 - 250 18 units,  251 -300 24 units,  301-350 30 units, 351-400 36 units.  gabapentin (NEURONTIN) 600 MG tablet Take 600 mg by mouth 3 times daily Indications: Pain       finasteride (PROSCAR) 5 MG tablet   Take 5 mg by mouth daily Prostate       No current facility-administered medications for this visit.         Social History     Socioeconomic History    Marital status:      Spouse name: None    Number of children: None    Years of education: None    Highest education level: None   Occupational History    None   Social Needs    Financial resource strain: None    Food insecurity:     Worry: None     Inability: None    Transportation needs:     Medical: None     Non-medical: None   Tobacco Use  Smoking status: Never Smoker    Smokeless tobacco: Never Used   Substance and Sexual Activity    Alcohol use: No    Drug use: No    Sexual activity: Yes     Partners: Female   Lifestyle    Physical activity:     Days per week: None     Minutes per session: None    Stress: None   Relationships    Social connections:     Talks on phone: None     Gets together: None     Attends Yazidism service: None     Active member of club or organization: None     Attends meetings of clubs or organizations: None     Relationship status: None    Intimate partner violence:     Fear of current or ex partner: None     Emotionally abused: None     Physically abused: None     Forced sexual activity: None   Other Topics Concern    None   Social History Narrative    None       Family History   Problem Relation Age of Onset    Arthritis Mother     Diabetes Mother     Heart Disease Mother     High Blood Pressure Mother     Stroke Mother     Diabetes Father     High Blood Pressure Father        Blood pressure 136/68, pulse 88, height 6' (1.829 m), weight 292 lb 6.4 oz (132.6 kg). General:   Well developed, well nourished  Lungs:   Clear to auscultation  Heart:    Normal S1 S2, No murmur, rubs, or gallops  Abdomen:   Soft, non tender, no organomegalies, positive bowel sounds  Extremities:   No edema, no cyanosis, good peripheral pulses  Neurological:   Awake, alert, oriented. No obvious focal deficits  Musculoskeletal:  No obvious deformities    EKG:  Atrial rhythm with PACs versus atrial fibrillation. Diagnosis Orders   1. PAF (paroxysmal atrial fibrillation) ([facility])  EKG 12 Lead    Holter monitor 48 hour   2. Essential hypertension, benign     3.  H/O cardiac radiofrequency ablation         Orders Placed This Encounter   Procedures    Holter monitor 48 hour     To be read by Dr Macho Landin     Standing Status:   Future     Standing Expiration Date:   4/8/2020     Order Specific Question:   Reason for Exam?     Answer: Irregular heart rate    EKG 12 Lead     Order Specific Question:   Reason for Exam?     Answer:   Irregular heart rate     Continue Dr Kareem Lin current treatment plan    Will order a 48-hour Holter monitor to better delineate his rhythm. Continue same current medications and with constant vigilance to changes in symptoms and also any potential side effects. Return for care if become worse or seek medical attention immediately. The patient is educated on symptoms of heart disease that include chest pain and passing out, dizziness, etc and to report them if there is any change or go to emergency room.        Follow up with Dr Suzanna Herrera as scheduled or sooner if needed  (Please note that portions of this note were completed with a voice recognition program.  Efforts were made to edit the dictation but occasionally words are mis-transcribed.)      Andre Corea PA-C

## 2019-04-12 ENCOUNTER — HOSPITAL ENCOUNTER (OUTPATIENT)
Dept: NON INVASIVE DIAGNOSTICS | Age: 69
Discharge: HOME | End: 2019-04-12
Payer: MEDICARE

## 2019-04-12 DIAGNOSIS — I48.0 PAF (PAROXYSMAL ATRIAL FIBRILLATION) (HCC): ICD-10-CM

## 2019-04-12 PROCEDURE — 93226 XTRNL ECG REC<48 HR SCAN A/R: CPT

## 2019-04-12 PROCEDURE — 93225 XTRNL ECG REC<48 HRS REC: CPT

## 2019-04-24 LAB
ACQUISITION DURATION: NORMAL S
AVERAGE HEART RATE: 85 BPM
HOOKUP DATE: NORMAL
HOOKUP TIME: NORMAL
MAX HEART RATE TIME/DATE: NORMAL
MAX HEART RATE: 113 BPM
MIN HEART RATE TIME/DATE: NORMAL
MIN HEART RATE: 55 BPM
NUMBER OF QRS COMPLEXES: NORMAL
NUMBER OF SUPRAVENTRICULAR BEATS IN RUNS: 0
NUMBER OF SUPRAVENTRICULAR COUPLETS: 0
NUMBER OF SUPRAVENTRICULAR ECTOPICS: 0
NUMBER OF SUPRAVENTRICULAR ISOLATED BEATS: 0
NUMBER OF SUPRAVENTRICULAR RUNS: 0
NUMBER OF VENTRICULAR BEATS IN RUNS: 0
NUMBER OF VENTRICULAR BIGEMINAL CYCLES: 0
NUMBER OF VENTRICULAR COUPLETS: 20
NUMBER OF VENTRICULAR ECTOPICS: 467
NUMBER OF VENTRICULAR ISOLATED BEATS: 427
NUMBER OF VENTRICULAR RUNS: 0

## 2019-05-03 ENCOUNTER — TELEPHONE (OUTPATIENT)
Dept: CARDIOLOGY CLINIC | Age: 69
End: 2019-05-03

## 2019-05-03 NOTE — TELEPHONE ENCOUNTER
Please patient's heart monitor results below:    HOLTER MONITOR: 48  Hours     INDICATION FOR STUDY:  Irregular Heart Rate    CONCLUSION:     Abnormal Study.  Rhythm was atrial fibrillation.  Overall ventricular rate well controlled.  Occasional PVC's. Patient has an appt on 6-12-19 with Dr. Ansley Dickey. Patient is asking what we are going to do about him being back in A-Fib? Does patient need to be seen sooner?

## 2019-05-07 ENCOUNTER — TELEPHONE (OUTPATIENT)
Dept: CARDIOTHORACIC SURGERY | Age: 69
End: 2019-05-07

## 2019-05-07 DIAGNOSIS — Z98.890 S/P MAZE OPERATION FOR ATRIAL FIBRILLATION: Primary | ICD-10-CM

## 2019-05-07 DIAGNOSIS — Z86.79 S/P MAZE OPERATION FOR ATRIAL FIBRILLATION: Primary | ICD-10-CM

## 2019-05-07 NOTE — TELEPHONE ENCOUNTER
Pt notified. Pt will need appt with Dr Nusrat Rodriguez per Dr Sandi Hall d/t Atrial Fib s/p 5 box. Please call pt with appt date and time.

## 2019-05-07 NOTE — TELEPHONE ENCOUNTER
Called patient to inform that Dr. Hood Temple requests the patient have a new EKG done. Order placed for EKG. Informed to patient that he can go to outpatient express testing at [facility] to have the EKG done and Caron Adair will call him with results and further instructions. Pt verbalized understanding.

## 2019-05-09 ENCOUNTER — HOSPITAL ENCOUNTER (OUTPATIENT)
Age: 69
Discharge: HOME | End: 2019-05-09
Payer: MEDICARE

## 2019-05-09 LAB
EKG ATRIAL RATE: 227 BPM
EKG Q-T INTERVAL: 386 MS
EKG QRS DURATION: 92 MS
EKG QTC CALCULATION (BAZETT): 422 MS
EKG R AXIS: 25 DEGREES
EKG T AXIS: 23 DEGREES
EKG VENTRICULAR RATE: 72 BPM

## 2019-05-09 PROCEDURE — 93005 ELECTROCARDIOGRAM TRACING: CPT | Performed by: THORACIC SURGERY (CARDIOTHORACIC VASCULAR SURGERY)

## 2019-05-09 PROCEDURE — 93010 ELECTROCARDIOGRAM REPORT: CPT | Performed by: NUCLEAR MEDICINE

## 2019-05-15 ENCOUNTER — TELEPHONE (OUTPATIENT)
Dept: CARDIOLOGY CLINIC | Age: 69
End: 2019-05-15

## 2019-05-28 ENCOUNTER — HOSPITAL ENCOUNTER (EMERGENCY)
Age: 69
Discharge: HOME | End: 2019-05-28
Payer: MEDICARE

## 2019-05-28 VITALS
TEMPERATURE: 98.2 F | DIASTOLIC BLOOD PRESSURE: 70 MMHG | HEART RATE: 90 BPM | HEIGHT: 72 IN | SYSTOLIC BLOOD PRESSURE: 144 MMHG | WEIGHT: 294 LBS | BODY MASS INDEX: 39.82 KG/M2 | OXYGEN SATURATION: 97 % | RESPIRATION RATE: 16 BRPM

## 2019-05-28 DIAGNOSIS — J06.9 UPPER RESPIRATORY TRACT INFECTION, UNSPECIFIED TYPE: Primary | ICD-10-CM

## 2019-05-28 PROCEDURE — 99212 OFFICE O/P EST SF 10 MIN: CPT

## 2019-05-28 PROCEDURE — 99214 OFFICE O/P EST MOD 30 MIN: CPT | Performed by: NURSE PRACTITIONER

## 2019-05-28 RX ORDER — CEFDINIR 300 MG/1
300 CAPSULE ORAL 2 TIMES DAILY
Qty: 14 CAPSULE | Refills: 0 | Status: SHIPPED | OUTPATIENT
Start: 2019-05-28 | End: 2019-06-04

## 2019-05-28 ASSESSMENT — PAIN SCALES - GENERAL: PAINLEVEL_OUTOF10: 7

## 2019-05-28 ASSESSMENT — ENCOUNTER SYMPTOMS
SORE THROAT: 1
SINUS PRESSURE: 1
VOMITING: 0
EYE ITCHING: 0
COUGH: 1
SHORTNESS OF BREATH: 0
EYE DISCHARGE: 0
NAUSEA: 0

## 2019-05-28 ASSESSMENT — PAIN DESCRIPTION - LOCATION: LOCATION: THROAT

## 2019-05-28 ASSESSMENT — PAIN DESCRIPTION - PAIN TYPE: TYPE: ACUTE PAIN

## 2019-05-28 ASSESSMENT — PAIN DESCRIPTION - DESCRIPTORS: DESCRIPTORS: SORE

## 2019-05-28 NOTE — ED TRIAGE NOTES
Pt complains of having a cold states he was caught in the rain twice. States it started on Saturday and has just been getting worse. States he has been taking tylenol and doing his nasal rinses.

## 2019-05-28 NOTE — ED NOTES
Pt. Released in stable condition, ambulated per self to private car. Instructed pt to follow-up with family doctor as needed for recheck or go directly to the emergency department for any concerns/worsening conditions. Pt. Verbalized understanding of instructions. No questions at this time. RX in hand.       Cindy Salcido RN  05/28/19 0847

## 2019-05-28 NOTE — ED PROVIDER NOTES
(Left, 05/02/2017); back surgery (05/2017); pr office/outpt visit,procedure only (N/A, 7/12/2018); and Lung biopsy. CURRENT MEDICATIONS       Previous Medications    ASPIRIN 325 MG EC TABLET    Take 1 tablet by mouth daily    CARVEDILOL (COREG) 6.25 MG TABLET    Take 6.25 mg by mouth 2 times daily (with meals)    DILTIAZEM (CARDIZEM CD) 120 MG EXTENDED RELEASE CAPSULE    Take 1 capsule by mouth daily    DOCUSATE SODIUM (COLACE) 250 MG CAPSULE    Take 250 mg by mouth nightly     FAMOTIDINE (PEPCID) 20 MG TABLET    Take 1 tablet by mouth 2 times daily    FINASTERIDE (PROSCAR) 5 MG TABLET      Take 5 mg by mouth daily Prostate    FLUTICASONE-VILANTEROL (BREO ELLIPTA) 100-25 MCG/INH AEPB INHALER    Inhale into the lungs daily    FUROSEMIDE (LASIX) 20 MG TABLET    Take 1 tablet by mouth daily    GABAPENTIN (NEURONTIN) 600 MG TABLET    Take 600 mg by mouth 3 times daily Indications: Pain     INSULIN ASPART (NOVOLOG) 100 UNIT/ML INJECTION VIAL    Inject into the skin 4 times daily Glucose  6 units,  101-120 9 units,  121 - 150 12 units,  151-200 15 units,  201 - 250 18 units,  251 -300 24 units,  301-350 30 units, 351-400 36 units. INSULIN NPH (HUMULIN N;NOVOLIN N) 100 UNIT/ML INJECTION VIAL    Inject 40 Units into the skin 2 times daily Lunch and bedtime    IPRATROPIUM (ATROVENT) 0.06 % NASAL SPRAY    2 sprays by Nasal route daily     LOSARTAN (COZAAR) 100 MG TABLET    Take 1 tablet by mouth daily    LOVASTATIN (MEVACOR) 20 MG TABLET    Take 1 tablet by mouth nightly    NONFORMULARY    Take 1 tablet by mouth 2 times daily Milk Thistle    NONFORMULARY    Take 1 capsule by mouth 2 times daily Equate Digestive care probiotic    TAMSULOSIN (FLOMAX) 0.4 MG CAPSULE    take 1 capsule by mouth once daily       ALLERGIES     Patient is is allergic to sulfa antibiotics.     Patients   Immunization History   Administered Date(s) Administered    Influenza Vaccine, unspecified formulation 10/10/2016    Influenza Virus Vaccine 10/24/2013, 10/10/2014, 10/14/2015    Influenza, High Dose (Fluzone 65 yrs and older) 10/26/2017    Pneumococcal 13-valent Conjugate ([id number]) 07/13/2016    Pneumococcal Conjugate 7-valent 09/15/2014    Zoster Live (Zostavax) 06/03/2013       FAMILY HISTORY     Patient's family history includes Arthritis in his mother; Diabetes in his father and mother; Heart Disease in his mother; High Blood Pressure in his father and mother; Stroke in his mother. SOCIAL HISTORY     Patient  reports that he has never smoked. He has never used smokeless tobacco. He reports that he does not drink alcohol or use drugs. PHYSICAL EXAM     ED TRIAGE VITALS  BP: (!) 144/70, Temp: 98.2 °F (36.8 °C), Pulse: 90, Resp: 16, SpO2: 97 %,Estimated body mass index is 39.87 kg/m² as calculated from the following:    Height as of this encounter: 6' (1.829 m). Weight as of this encounter: 294 lb (133.4 kg). ,No LMP for male patient. Physical Exam   Constitutional: He is oriented to person, place, and time. He appears well-developed and well-nourished. No distress. HENT:   Right Ear: Tympanic membrane is bulging. Tympanic membrane is not erythematous. Left Ear: Tympanic membrane is bulging. Tympanic membrane is not erythematous. Mouth/Throat: Posterior oropharyngeal erythema present. No oropharyngeal exudate. Tonsils are 0 on the right. Tonsils are 0 on the left. No tonsillar exudate. Eyes: Right conjunctiva is not injected. Left conjunctiva is not injected. Pupils are equal.   Neck: Normal range of motion. Cardiovascular: Normal rate and regular rhythm. No murmur heard. Pulmonary/Chest: Effort normal and breath sounds normal. No respiratory distress. Musculoskeletal:        Right knee: He exhibits normal range of motion. Left knee: He exhibits normal range of motion. Lymphadenopathy:        Head (right side): Tonsillar adenopathy present. No preauricular and no posterior auricular adenopathy present. Head (left side): No tonsillar, no preauricular and no posterior auricular adenopathy present. He has no cervical adenopathy. Neurological: He is alert and oriented to person, place, and time. Skin: Skin is warm. No rash noted. He is not diaphoretic. Psychiatric: He has a normal mood and affect. His behavior is normal.       DIAGNOSTIC RESULTS     Labs:No results found for this visit on 05/28/19. IMAGING:    No orders to display         EKG:  none    URGENT CARE COURSE:     Vitals:    05/28/19 1029   BP: (!) 144/70   Pulse: 90   Resp: 16   Temp: 98.2 °F (36.8 °C)   SpO2: 97%   Weight: 294 lb (133.4 kg)   Height: 6' (1.829 m)       Medications - No data to display         PROCEDURES:  None    FINAL IMPRESSION      1. Upper respiratory tract infection, unspecified type          DISPOSITION/ PLAN       Exam is consistent with an upper respiratory infection at this time. I discussed with the patient that although this may be viral in nature, due to the increase in severity of symptoms we will plan to treat with oral antibiotics and he'll be given a prescription for Coricidin with Mucinex. He is agreeable plan as discussed and will follow up with PCP on outpatient basis as needed.     PATIENT REFERRED TO:  MD MIREILLE Posada/Qasim Angeles / [place] [address]      DISCHARGE MEDICATIONS:  New Prescriptions    CEFDINIR (OMNICEF) 300 MG CAPSULE    Take 1 capsule by mouth 2 times daily for 7 days    DEXTROMETHORPHAN-GUAIFENESIN (CORICIDIN HBP CONGESTION/COUGH)  MG CAPS    Take 1 capsule by mouth 2 times daily as needed (cough/congestion)       Discontinued Medications    No medications on file       Current Discharge Medication List          RAMIREZ Livingston CNP    (Please note that portions of this note were completed with a voice recognition program. Efforts were made to edit the dictations but occasionally words are mis-transcribed.)          RAMIREZ Livingston CNP  05/28/19 Laurence Lao

## 2019-06-27 ENCOUNTER — TELEPHONE (OUTPATIENT)
Dept: UROLOGY | Age: 69
End: 2019-06-27

## 2019-06-27 DIAGNOSIS — R82.90 FOUL SMELLING URINE: ICD-10-CM

## 2019-06-27 DIAGNOSIS — R35.0 URINARY FREQUENCY: ICD-10-CM

## 2019-06-27 DIAGNOSIS — R30.0 DYSURIA: ICD-10-CM

## 2019-06-27 DIAGNOSIS — R39.15 URGENCY OF URINATION: Primary | ICD-10-CM

## 2019-06-27 RX ORDER — DOXYCYCLINE HYCLATE 100 MG
100 TABLET ORAL 2 TIMES DAILY
Qty: 14 TABLET | Refills: 0 | Status: SHIPPED | OUTPATIENT
Start: 2019-06-27 | End: 2019-07-04

## 2019-06-27 NOTE — TELEPHONE ENCOUNTER
The patient thinks he has an UTI. The urine is foul smelling and cloudy. He has burning with the start start of his stream. He has pain in right side of the groin described as a dull discomfort. He has occasional urgency and frequency. He denies fever or chills. I placed an order for an urine. Please advise. Thank you.

## 2019-06-27 NOTE — TELEPHONE ENCOUNTER
I have personally verified, reviewed, and approved these actions. I agree. Please check urine with reflex culture. Script for doxycycline sent to pharmacy for empiric treatment pending culture results. Moni Copa. Patient should take medication with food and drink at least 8 ounces (240 mls) of water when swallowing the medication. Pt should also sit up at least 30 minutes after taking medication to reduce the risk of esophageal irritation.

## 2019-06-28 LAB
APPEARANCE: ABNORMAL
BILIRUBIN URINE: NEGATIVE
COLOR: ABNORMAL
GLUCOSE URINE: ABNORMAL
KETONES, URINE: NEGATIVE
LEUKOCYTES, UA: ABNORMAL
MUCUS: PRESENT
NITRITE, URINE: NEGATIVE
PH, URINE: 6 (ref 5–8)
PROTEIN, URINE: ABNORMAL
RBC URINE: >100 /HPF
SPECIFIC GRAVITY, URINE: 1.02 (ref 1–1.03)
SQUAMOUS EPITHELIAL: ABNORMAL /HPF
URINALYSIS REFLEX: YES
URINE HGB: ABNORMAL
UROBILINOGEN, URINE: ABNORMAL (ref 0.2–1)
WBC CLUMPS: PRESENT /HPF
WBC URINE: >100 /HPF

## 2019-07-01 ENCOUNTER — TELEPHONE (OUTPATIENT)
Dept: UROLOGY | Age: 69
End: 2019-07-01

## 2019-07-01 LAB — URINE CULTURE REFLEX: NORMAL

## 2019-07-01 RX ORDER — CIPROFLOXACIN 500 MG/1
500 TABLET, FILM COATED ORAL 2 TIMES DAILY
Qty: 14 TABLET | Refills: 0 | Status: SHIPPED | OUTPATIENT
Start: 2019-07-01 | End: 2019-07-08

## 2019-07-09 ENCOUNTER — TELEPHONE (OUTPATIENT)
Dept: CARDIOTHORACIC SURGERY | Age: 69
End: 2019-07-09

## 2019-07-11 ENCOUNTER — TELEPHONE (OUTPATIENT)
Dept: CARDIOLOGY CLINIC | Age: 69
End: 2019-07-11

## 2019-07-23 ENCOUNTER — OFFICE VISIT (OUTPATIENT)
Dept: CARDIOLOGY CLINIC | Age: 69
End: 2019-07-23
Payer: MEDICARE

## 2019-07-23 VITALS
HEIGHT: 72 IN | BODY MASS INDEX: 40.52 KG/M2 | HEART RATE: 64 BPM | SYSTOLIC BLOOD PRESSURE: 147 MMHG | DIASTOLIC BLOOD PRESSURE: 76 MMHG | WEIGHT: 299.2 LBS

## 2019-07-23 DIAGNOSIS — I48.92 ATRIAL FLUTTER, UNSPECIFIED TYPE (HCC): Primary | ICD-10-CM

## 2019-07-23 PROCEDURE — 1123F ACP DISCUSS/DSCN MKR DOCD: CPT | Performed by: INTERNAL MEDICINE

## 2019-07-23 PROCEDURE — 93000 ELECTROCARDIOGRAM COMPLETE: CPT | Performed by: INTERNAL MEDICINE

## 2019-07-23 PROCEDURE — G8417 CALC BMI ABV UP PARAM F/U: HCPCS | Performed by: INTERNAL MEDICINE

## 2019-07-23 PROCEDURE — G8427 DOCREV CUR MEDS BY ELIG CLIN: HCPCS | Performed by: INTERNAL MEDICINE

## 2019-07-23 PROCEDURE — 1036F TOBACCO NON-USER: CPT | Performed by: INTERNAL MEDICINE

## 2019-07-23 PROCEDURE — 4040F PNEUMOC VAC/ADMIN/RCVD: CPT | Performed by: INTERNAL MEDICINE

## 2019-07-23 PROCEDURE — 99214 OFFICE O/P EST MOD 30 MIN: CPT | Performed by: INTERNAL MEDICINE

## 2019-07-23 PROCEDURE — 3017F COLORECTAL CA SCREEN DOC REV: CPT | Performed by: INTERNAL MEDICINE

## 2019-07-23 ASSESSMENT — ENCOUNTER SYMPTOMS
BLURRED VISION: 0
DIARRHEA: 0
WHEEZING: 0
COUGH: 0
LEFT EYE: 0
DOUBLE VISION: 0
ABDOMINAL PAIN: 0
VOMITING: 0
BACK PAIN: 0
SORE THROAT: 0
CONSTIPATION: 0
SHORTNESS OF BREATH: 0
NAUSEA: 0
RIGHT EYE: 0

## 2019-07-23 NOTE — PROGRESS NOTES
bruise/bleed easily. Skin: Negative for dry skin, itching and rash. Musculoskeletal: Negative for arthritis, back pain, joint pain and myalgias. Gastrointestinal: Negative for abdominal pain, constipation, diarrhea, nausea and vomiting. Genitourinary: Negative for dysuria, frequency, hematuria and urgency. Neurological: Negative for dizziness, headaches, light-headedness, numbness, paresthesias, seizures and vertigo. Psychiatric/Behavioral: Negative for depression and memory loss. The patient is not nervous/anxious. PHYSICAL EXAM:  BP (!) 147/76   Pulse 64   Ht 6' (1.829 m)   Wt 299 lb 3.2 oz (135.7 kg)   BMI 40.58 kg/m²     Physical Exam   Constitutional: He is oriented to person, place, and time. No distress. HENT:   Head: Normocephalic and atraumatic. Head is without contusion. Right Ear: Hearing and external ear normal. No decreased hearing is noted. Left Ear: Hearing and external ear normal. No decreased hearing is noted. Nose: Nose normal. No sinus tenderness. No epistaxis. Mouth/Throat: Oropharynx is clear and moist. Mucous membranes are not dry. No oropharyngeal exudate. Eyes: Pupils are equal, round, and reactive to light. Conjunctivae and EOM are normal. Right eye exhibits no discharge. Left eye exhibits no discharge. Neck: Trachea normal. Neck supple. No JVD present. No edema present. No thyroid mass present. Cardiovascular: Normal rate, normal heart sounds and normal pulses. A regularly irregular rhythm present. No extrasystoles are present. Pulmonary/Chest: Effort normal and breath sounds normal. He exhibits no tenderness. Breasts are symmetrical.   Abdominal: Soft. Normal appearance and bowel sounds are normal. He exhibits no mass. There is no hepatosplenomegaly. There is no tenderness. No hernia. Musculoskeletal: Normal range of motion. Right shoulder: Normal. He exhibits normal range of motion and no swelling.         Left shoulder: Normal. He

## 2019-09-04 ENCOUNTER — TELEPHONE (OUTPATIENT)
Dept: CARDIOTHORACIC SURGERY | Age: 69
End: 2019-09-04

## 2019-09-09 RX ORDER — DILTIAZEM HYDROCHLORIDE 120 MG/1
CAPSULE, EXTENDED RELEASE ORAL
Qty: 90 CAPSULE | Refills: 1 | Status: SHIPPED | OUTPATIENT
Start: 2019-09-09 | End: 2019-09-23 | Stop reason: ALTCHOICE

## 2019-09-23 ENCOUNTER — OFFICE VISIT (OUTPATIENT)
Dept: CARDIOLOGY CLINIC | Age: 69
End: 2019-09-23
Payer: MEDICARE

## 2019-09-23 VITALS
HEART RATE: 122 BPM | DIASTOLIC BLOOD PRESSURE: 90 MMHG | WEIGHT: 314.2 LBS | HEIGHT: 72 IN | SYSTOLIC BLOOD PRESSURE: 120 MMHG | BODY MASS INDEX: 42.56 KG/M2

## 2019-09-23 DIAGNOSIS — I48.21 PERMANENT ATRIAL FIBRILLATION (HCC): Primary | ICD-10-CM

## 2019-09-23 DIAGNOSIS — I10 ESSENTIAL HYPERTENSION, BENIGN: ICD-10-CM

## 2019-09-23 DIAGNOSIS — I48.92 ATRIAL FLUTTER, UNSPECIFIED TYPE (HCC): ICD-10-CM

## 2019-09-23 PROCEDURE — G8417 CALC BMI ABV UP PARAM F/U: HCPCS | Performed by: PHYSICIAN ASSISTANT

## 2019-09-23 PROCEDURE — G8427 DOCREV CUR MEDS BY ELIG CLIN: HCPCS | Performed by: PHYSICIAN ASSISTANT

## 2019-09-23 PROCEDURE — 93000 ELECTROCARDIOGRAM COMPLETE: CPT | Performed by: PHYSICIAN ASSISTANT

## 2019-09-23 PROCEDURE — 1123F ACP DISCUSS/DSCN MKR DOCD: CPT | Performed by: PHYSICIAN ASSISTANT

## 2019-09-23 PROCEDURE — 3017F COLORECTAL CA SCREEN DOC REV: CPT | Performed by: PHYSICIAN ASSISTANT

## 2019-09-23 PROCEDURE — 1036F TOBACCO NON-USER: CPT | Performed by: PHYSICIAN ASSISTANT

## 2019-09-23 PROCEDURE — 99213 OFFICE O/P EST LOW 20 MIN: CPT | Performed by: PHYSICIAN ASSISTANT

## 2019-09-23 PROCEDURE — 4040F PNEUMOC VAC/ADMIN/RCVD: CPT | Performed by: PHYSICIAN ASSISTANT

## 2019-09-23 RX ORDER — CELECOXIB 200 MG/1
CAPSULE ORAL
Refills: 2 | COMMUNITY
Start: 2019-08-29

## 2019-09-30 ENCOUNTER — OFFICE VISIT (OUTPATIENT)
Dept: CARDIOLOGY CLINIC | Age: 69
End: 2019-09-30
Payer: MEDICARE

## 2019-09-30 VITALS
WEIGHT: 315 LBS | HEIGHT: 72 IN | SYSTOLIC BLOOD PRESSURE: 128 MMHG | HEART RATE: 100 BPM | DIASTOLIC BLOOD PRESSURE: 72 MMHG | BODY MASS INDEX: 42.66 KG/M2

## 2019-09-30 DIAGNOSIS — I48.91 ATRIAL FIBRILLATION, UNSPECIFIED TYPE (HCC): Primary | ICD-10-CM

## 2019-09-30 DIAGNOSIS — I10 ESSENTIAL HYPERTENSION: ICD-10-CM

## 2019-09-30 PROCEDURE — 1036F TOBACCO NON-USER: CPT | Performed by: INTERNAL MEDICINE

## 2019-09-30 PROCEDURE — G8428 CUR MEDS NOT DOCUMENT: HCPCS | Performed by: INTERNAL MEDICINE

## 2019-09-30 PROCEDURE — 99214 OFFICE O/P EST MOD 30 MIN: CPT | Performed by: INTERNAL MEDICINE

## 2019-09-30 PROCEDURE — 3017F COLORECTAL CA SCREEN DOC REV: CPT | Performed by: INTERNAL MEDICINE

## 2019-09-30 PROCEDURE — G8417 CALC BMI ABV UP PARAM F/U: HCPCS | Performed by: INTERNAL MEDICINE

## 2019-09-30 PROCEDURE — 1123F ACP DISCUSS/DSCN MKR DOCD: CPT | Performed by: INTERNAL MEDICINE

## 2019-09-30 PROCEDURE — 4040F PNEUMOC VAC/ADMIN/RCVD: CPT | Performed by: INTERNAL MEDICINE

## 2019-09-30 RX ORDER — VERAPAMIL HYDROCHLORIDE 240 MG/1
240 TABLET, FILM COATED, EXTENDED RELEASE ORAL DAILY
Qty: 90 TABLET | Refills: 1 | Status: SHIPPED | OUTPATIENT
Start: 2019-09-30 | End: 2019-10-17 | Stop reason: SDUPTHER

## 2019-09-30 NOTE — PROGRESS NOTES
History   Past Surgical History:   Procedure Laterality Date    BACK SURGERY  05/2017    CARDIAC CATHETERIZATION  1995??    no stents    CHOLECYSTECTOMY  1984??    COLONOSCOPY      last one 2015??    CYSTOSCOPY  05/02/2017    KIDNEY STONE SURGERY Left 05/02/2017    basket retrievel   [facility]? ??    arthroscopy, left    LITHOTRIPSY  1990?? -  2016?? kidney stones    LUNG BIOPSY      OTHER SURGICAL HISTORY  5/26/2016    LEFT ESWL    AL OFFICE/OUTPT VISIT,PROCEDURE ONLY N/A 7/12/2018    5--BOX THORASCOPIC MAZE WITH IGGY performed by Bryce Schumacher MD at [facility]  2000??    right    SKIN BIOPSY      URETEROSCOPY Left 05/02/2017       Review of Systems   Constitutional: Negative for chills and fever  HENT: Negative for congestion, sinus pressure, sneezing and sore throat. Eyes: Negative for pain, discharge, redness and itching. Respiratory: Negative for apnea, cough  Gastrointestinal: Negative for blood in stool, constipation, diarrhea   Endocrine: Negative for cold intolerance, heat intolerance, polydipsia. Genitourinary: Negative for dysuria, enuresis, flank pain and hematuria. Musculoskeletal: Negative for arthralgias, joint swelling and neck pain. Neurological: Negative for numbness and headaches. Psychiatric/Behavioral: Negative for agitation, confusion, decreased concentration and dysphoric mood. Objective:     BP (!) 142/88   Pulse 100   Ht 6' (1.829 m)   Wt (!) 317 lb 9.6 oz (144.1 kg)   BMI 43.07 kg/m²     Wt Readings from Last 3 Encounters:   09/30/19 (!) 317 lb 9.6 oz (144.1 kg)   09/23/19 (!) 314 lb 3.2 oz (142.5 kg)   07/23/19 299 lb 3.2 oz (135.7 kg)     BP Readings from Last 3 Encounters:   09/30/19 (!) 142/88   09/23/19 (!) 120/90   07/23/19 (!) 147/76       Nursing note and vitals reviewed. Physical Exam   Constitutional: Oriented to person, place, and time. Appears well-developed and well-nourished.    HENT:   Head: Normocephalic and range. There was no evidence for  stenosis. There was trivial regurgitation. LEFT ATRIUM: Size was normal.    RIGHT VENTRICLE: The ventricle was mildly dilated. Systolic function was  normal. Wall thickness was normal.    PULMONIC VALVE: Leaflets exhibited normal thickness, no calcification, and  normal cuspal separation. DOPPLER: The transpulmonic velocity was within the  normal range. There was trivial regurgitation. PULMONARY ARTERY: The size was normal. DOPPLER: Systolic pressure was within  the normal range. TRICUSPID VALVE: The valve structure was normal. There was normal leaflet  separation. DOPPLER: The transtricuspid velocity was within the normal range. There was no evidence for stenosis. There was mild regurgitation. RIGHT ATRIUM: Size was normal.    SYSTEMIC VEINS: IVC: The inferior vena cava was normal in size. PERICARDIUM: There was no pericardial effusion. The pericardium was normal in  appearance. SYSTEM MEASUREMENT TABLES    2D mode  FS (2D-Cubed): 23 %  FS (2D-Teich): 23 %  IVSd (2D): 1.2 cm  IVSd; Mean chosen (2D): 1.2 cm  LVIDd (2D): 4.7 cm  LVIDs (2D): 3.6 cm  LVOT Area (2D): 3.8 cm2  LVPWd (2D): 1.2 cm  RVIDd (2D): 3 cm    M mode  AoR Diam (MM): 3 cm  AV Cusp Sep (MM): 2.3 cm    Unspecified Scan Mode  VTI; Antegrade Flow: 21.5 cm  VTI; Mean; Antegrade Flow: 21.5 cm  LVOT Diam: 2.2 cm  LVOT Vmax: 73.7 cm/s  Vmax; Antegrade Flow: 72.1 cm/s    SUMMARY:    Left ventricle:  Size was normal.  Systolic function was normal. Ejection fraction was estimated in the range of  50 % to 55 %. There were no regional wall motion abnormalities. Wall thickness was mildly increased. Concentric hypertrophy was present. Right ventricle: The ventricle was mildly dilated. Tricuspid valve: There was mild regurgitation.     Prepared and signed by    Lizz Gresham DO  Signed 13-Nov-2013 18:34:33          Assessment/Plan   Afib s/p 5 Box MAZE and CARLTON Clip  HTN  DM II  Preserved EF  Increase

## 2019-10-14 ENCOUNTER — TELEPHONE (OUTPATIENT)
Dept: CARDIOLOGY CLINIC | Age: 69
End: 2019-10-14

## 2019-10-17 RX ORDER — VERAPAMIL HYDROCHLORIDE 240 MG/1
240 TABLET, FILM COATED, EXTENDED RELEASE ORAL DAILY
Qty: 90 TABLET | Refills: 2 | Status: SHIPPED | OUTPATIENT
Start: 2019-10-17 | End: 2019-12-10 | Stop reason: ALTCHOICE

## 2019-12-10 ENCOUNTER — OFFICE VISIT (OUTPATIENT)
Dept: CARDIOLOGY CLINIC | Age: 69
End: 2019-12-10
Payer: MEDICARE

## 2019-12-10 VITALS
DIASTOLIC BLOOD PRESSURE: 80 MMHG | HEART RATE: 49 BPM | BODY MASS INDEX: 42.66 KG/M2 | SYSTOLIC BLOOD PRESSURE: 154 MMHG | WEIGHT: 315 LBS | HEIGHT: 72 IN

## 2019-12-10 DIAGNOSIS — I48.92 ATRIAL FLUTTER, UNSPECIFIED TYPE (HCC): Primary | ICD-10-CM

## 2019-12-10 DIAGNOSIS — Z98.890 HISTORY OF MAZE PROCEDURE: ICD-10-CM

## 2019-12-10 PROCEDURE — 99213 OFFICE O/P EST LOW 20 MIN: CPT | Performed by: PHYSICIAN ASSISTANT

## 2019-12-10 PROCEDURE — 1036F TOBACCO NON-USER: CPT | Performed by: PHYSICIAN ASSISTANT

## 2019-12-10 PROCEDURE — G8417 CALC BMI ABV UP PARAM F/U: HCPCS | Performed by: PHYSICIAN ASSISTANT

## 2019-12-10 PROCEDURE — 4040F PNEUMOC VAC/ADMIN/RCVD: CPT | Performed by: PHYSICIAN ASSISTANT

## 2019-12-10 PROCEDURE — G8427 DOCREV CUR MEDS BY ELIG CLIN: HCPCS | Performed by: PHYSICIAN ASSISTANT

## 2019-12-10 PROCEDURE — 3017F COLORECTAL CA SCREEN DOC REV: CPT | Performed by: PHYSICIAN ASSISTANT

## 2019-12-10 PROCEDURE — G8484 FLU IMMUNIZE NO ADMIN: HCPCS | Performed by: PHYSICIAN ASSISTANT

## 2019-12-10 PROCEDURE — 1123F ACP DISCUSS/DSCN MKR DOCD: CPT | Performed by: PHYSICIAN ASSISTANT

## 2020-01-03 ENCOUNTER — HOSPITAL ENCOUNTER (EMERGENCY)
Age: 70
Discharge: HOME | End: 2020-01-03
Payer: MEDICARE

## 2020-01-03 VITALS
OXYGEN SATURATION: 97 % | HEART RATE: 57 BPM | RESPIRATION RATE: 18 BRPM | TEMPERATURE: 96.3 F | DIASTOLIC BLOOD PRESSURE: 67 MMHG | SYSTOLIC BLOOD PRESSURE: 140 MMHG | BODY MASS INDEX: 40.69 KG/M2 | WEIGHT: 300 LBS

## 2020-01-03 PROCEDURE — 99212 OFFICE O/P EST SF 10 MIN: CPT

## 2020-01-03 PROCEDURE — 99213 OFFICE O/P EST LOW 20 MIN: CPT | Performed by: NURSE PRACTITIONER

## 2020-01-03 RX ORDER — CEFDINIR 300 MG/1
300 CAPSULE ORAL 2 TIMES DAILY
Qty: 20 CAPSULE | Refills: 0 | Status: SHIPPED | OUTPATIENT
Start: 2020-01-03 | End: 2020-01-13

## 2020-01-03 ASSESSMENT — PAIN DESCRIPTION - DESCRIPTORS: DESCRIPTORS: ACHING

## 2020-01-03 ASSESSMENT — ENCOUNTER SYMPTOMS
DIARRHEA: 0
COUGH: 0
SINUS PAIN: 1
NAUSEA: 0
SINUS PRESSURE: 1
VOMITING: 0

## 2020-01-03 ASSESSMENT — PAIN DESCRIPTION - LOCATION: LOCATION: EAR

## 2020-01-03 ASSESSMENT — PAIN SCALES - GENERAL: PAINLEVEL_OUTOF10: 4

## 2020-01-03 ASSESSMENT — PAIN DESCRIPTION - PAIN TYPE: TYPE: ACUTE PAIN

## 2020-01-03 ASSESSMENT — PAIN DESCRIPTION - ORIENTATION: ORIENTATION: LEFT

## 2020-01-03 NOTE — ED PROVIDER NOTES
[facility]  Urgent Care Encounter       CHIEF COMPLAINT       Chief Complaint   Patient presents with    Otalgia     Left ear pain and nauseated. Started this morning. Nurses Notes reviewed and I agree except as noted in the HPI. HISTORY OF PRESENT ILLNESS   Christopher Hatch is a 78 y.o. male who presents     Patient states that for approximately 4 to 5 days he has had sinus congestion, states that yesterday his symptoms did seem to improve, however today had woke up with left ear tenderness. Denies any recent fevers. Patient does have a history of A. fib and diabetes. Patient has been trying over-the-counter Coricidin HBP, but does not believe this has been helpful. REVIEW OF SYSTEMS     Review of Systems   Constitutional: Negative for chills, fatigue and fever. HENT: Positive for congestion, ear pain (Left ear), postnasal drip, sinus pressure and sinus pain. Negative for hearing loss. Respiratory: Negative for cough. Gastrointestinal: Negative for diarrhea, nausea and vomiting. Musculoskeletal: Negative for neck pain and neck stiffness. Skin: Negative for rash. Neurological: Negative for dizziness, weakness, light-headedness, numbness and headaches. PAST MEDICAL HISTORY         Diagnosis Date    Arthritis     [facility]     Cancer ([facility]     skin    Chronic kidney disease     Diabetes mellitus ([facility]     History of esophagogastroduodenoscopy (EGD) 6/7/16    History of kidney stones     Hyperlipidemia     Hypertension        SURGICALHISTORY     Patient  has a past surgical history that includes knee surgery (1988???); Rotator cuff repair (2000?? ); Lithotripsy (1990?? -  2016??); other surgical history (5/26/2016); Cardiac catheterization (1995??); Cholecystectomy (1984??); Colonoscopy; skin biopsy; Cystoscopy (05/02/2017); Ureteroscopy (Left, 05/02/2017);  Kidney stone surgery (Left, 05/02/2017); back surgery (05/2017); pr office/outpt visit,procedure only (N/A, 7/12/2018); and Lung biopsy. CURRENT MEDICATIONS       Discharge Medication List as of 1/3/2020 11:09 AM      CONTINUE these medications which have NOT CHANGED    Details   verapamil (CALAN SR) 180 MG extended release tablet Take 1 tablet by mouth daily, Disp-30 tablet, R-5Normal      celecoxib (CELEBREX) 200 MG capsule TAKE 1 CAPSULE BY MOUTH ONCE DAILY WITH FOOD, R-2Historical Med      Dextromethorphan-guaiFENesin (CORICIDIN HBP CONGESTION/COUGH)  MG CAPS Take 1 capsule by mouth 2 times daily as needed (cough/congestion), Disp-60 capsule, R-0Normal      losartan (COZAAR) 100 MG tablet Take 1 tablet by mouth daily, Disp-30 tablet, R-5Normal      lovastatin (MEVACOR) 20 MG tablet Take 1 tablet by mouth nightly, Disp-90 tablet, R-3Normal      carvedilol (COREG) 6.25 MG tablet Take 6.25 mg by mouth 2 times daily (with meals)Historical Med      furosemide (LASIX) 20 MG tablet Take 1 tablet by mouth daily, Disp-60 tablet, R-0Normal      ipratropium (ATROVENT) 0.06 % nasal spray 2 sprays by Nasal route daily Historical Med      aspirin 325 MG EC tablet Take 1 tablet by mouth daily, Disp-30 tablet, R-3Normal      !! NONFORMULARY Take 1 tablet by mouth 2 times daily Milk ThistleHistorical Med      !! NONFORMULARY Take 1 capsule by mouth 2 times daily Equate Digestive care probioticHistorical Med      tamsulosin (FLOMAX) 0.4 MG capsule take 1 capsule by mouth once daily, Disp-90 capsule, R-3Normal      insulin NPH (HUMULIN N;NOVOLIN N) 100 UNIT/ML injection vial Inject 40 Units into the skin 2 times daily Lunch and bedtimeHistorical Med      insulin aspart (NOVOLOG) 100 UNIT/ML injection vial Inject into the skin 4 times daily Glucose  6 units,  101-120 9 units,  121 - 150 12 units,  151-200 15 units,  201 - 250 18 units,  251 -300 24 units,  301-350 30 units, 351-400 36 units. Historical Med      gabapentin (NEURONTIN) 600 MG tablet Take 600 mg by mouth 3 times daily Indications: Pain       finasteride (PROSCAR) 5 MG tablet   Take 5 mg by mouth daily Prostate       !! - Potential duplicate medications found. Please discuss with provider. ALLERGIES     Patient is is allergic to sulfa antibiotics. Patients   Immunization History   Administered Date(s) Administered    Influenza Vaccine, unspecified formulation 10/10/2016    Influenza Virus Vaccine 10/24/2013, 10/10/2014, 10/14/2015    Influenza, High Dose (Fluzone 65 yrs and older) 10/26/2017    Pneumococcal Conjugate 13-valent ([id number]) 07/13/2016    Pneumococcal Conjugate 7-valent (Abran Remedios) 09/15/2014    Zoster Live (Zostavax) 06/03/2013       FAMILY HISTORY     Patient's family history includes Arthritis in his mother; Diabetes in his father and mother; Heart Disease in his mother; High Blood Pressure in his father and mother; Stroke in his mother. SOCIAL HISTORY     Patient  reports that he has never smoked. He has never used smokeless tobacco. He reports that he does not drink alcohol or use drugs. PHYSICAL EXAM     ED TRIAGE VITALS  BP: (!) 140/67, Temp: 96.3 °F (35.7 °C), Pulse: 57, Resp: 18, SpO2: 97 %,Estimated body mass index is 40.69 kg/m² as calculated from the following:    Height as of 12/10/19: 6' (1.829 m). Weight as of this encounter: 300 lb (136.1 kg). ,No LMP for male patient. Physical Exam  Constitutional:       General: He is not in acute distress. Appearance: Normal appearance. He is not ill-appearing, toxic-appearing or diaphoretic. HENT:      Right Ear: No decreased hearing noted. No drainage. No middle ear effusion. Tympanic membrane is not injected, scarred, perforated, erythematous, retracted or bulging. Tympanic membrane has normal mobility. Left Ear: No decreased hearing noted. Tenderness present. No drainage. A middle ear effusion is present. Tympanic membrane is not injected, scarred, perforated, erythematous, retracted or bulging.  Tympanic membrane has normal mobility. Nose: Congestion present. Mouth/Throat:      Mouth: Mucous membranes are moist.   Pulmonary:      Effort: Pulmonary effort is normal.   Musculoskeletal: Normal range of motion. Skin:     General: Skin is warm. Capillary Refill: Capillary refill takes less than 2 seconds. Findings: No erythema or rash. Neurological:      General: No focal deficit present. Mental Status: He is alert and oriented to person, place, and time. Sensory: No sensory deficit. Psychiatric:         Mood and Affect: Mood normal.         Behavior: Behavior normal.         Thought Content: Thought content normal.         Judgment: Judgment normal.         DIAGNOSTIC RESULTS     Labs:No results found for this visit on 01/03/20. IMAGING:    No orders to display     URGENT CARE COURSE:     Vitals:    01/03/20 1052   BP: (!) 140/67   Pulse: 57   Resp: 18   Temp: 96.3 °F (35.7 °C)   TempSrc: Temporal   SpO2: 97%   Weight: 300 lb (136.1 kg)       Medications - No data to display         PROCEDURES:  None    FINAL IMPRESSION      1. Otalgia of left ear    2. Upper respiratory tract infection, unspecified type          DISPOSITION/ PLAN   Patient is discharged home with prescription for Omnicef that he may begin taking today given that symptoms seem to be lingering for the last 4 to 5 days, and history of A. fib as well as diabetes. Patient should follow-up with his primary care provider within the next 4 to 5 days if symptoms do not seem to be improving.         PATIENT REFERRED TO:  MD MIREILLE Russell/Qasim [place] / [place] [address]      DISCHARGE MEDICATIONS:  Discharge Medication List as of 1/3/2020 11:09 AM      START taking these medications    Details   cefdinir (OMNICEF) 300 MG capsule Take 1 capsule by mouth 2 times daily for 10 days, Disp-20 capsule, R-0Print             Discharge Medication List as of 1/3/2020 11:09 AM          Discharge Medication List as of 1/3/2020 11:09 AM RAMIREZ Martinez NP    (Please note that portions of this note were completed with a voice recognition program. Efforts were made to edit the dictations but occasionally words are mis-transcribed.)         RAMIREZ Fox NP  01/03/20 0236

## 2020-01-04 ENCOUNTER — HOSPITAL ENCOUNTER (EMERGENCY)
Age: 70
Discharge: HOME | End: 2020-01-04
Payer: MEDICARE

## 2020-01-04 VITALS
BODY MASS INDEX: 40.69 KG/M2 | RESPIRATION RATE: 17 BRPM | DIASTOLIC BLOOD PRESSURE: 82 MMHG | HEART RATE: 76 BPM | WEIGHT: 300 LBS | SYSTOLIC BLOOD PRESSURE: 168 MMHG | OXYGEN SATURATION: 98 % | TEMPERATURE: 97.7 F

## 2020-01-04 LAB
ALBUMIN SERPL-MCNC: 3.8 G/DL (ref 3.5–5.1)
ALP BLD-CCNC: 115 U/L (ref 38–126)
ALT SERPL-CCNC: 13 U/L (ref 11–66)
ANION GAP SERPL CALCULATED.3IONS-SCNC: 15 MEQ/L (ref 8–16)
AST SERPL-CCNC: 19 U/L (ref 5–40)
BASOPHILS # BLD: 0.6 %
BASOPHILS ABSOLUTE: 0.1 THOU/MM3 (ref 0–0.1)
BILIRUB SERPL-MCNC: 1.2 MG/DL (ref 0.3–1.2)
BILIRUBIN DIRECT: 0.3 MG/DL (ref 0–0.3)
BILIRUBIN URINE: NEGATIVE
BLOOD, URINE: NEGATIVE
BUN BLDV-MCNC: 12 MG/DL (ref 7–22)
CALCIUM SERPL-MCNC: 9.7 MG/DL (ref 8.5–10.5)
CHARACTER, URINE: CLEAR
CHLORIDE BLD-SCNC: 99 MEQ/L (ref 98–111)
CO2: 24 MEQ/L (ref 23–33)
COLOR: YELLOW
CREAT SERPL-MCNC: 0.8 MG/DL (ref 0.4–1.2)
EKG ATRIAL RATE: 66 BPM
EKG Q-T INTERVAL: 444 MS
EKG QRS DURATION: 94 MS
EKG QTC CALCULATION (BAZETT): 450 MS
EKG R AXIS: 27 DEGREES
EKG T AXIS: 37 DEGREES
EKG VENTRICULAR RATE: 62 BPM
EOSINOPHIL # BLD: 1.4 %
EOSINOPHILS ABSOLUTE: 0.1 THOU/MM3 (ref 0–0.4)
ERYTHROCYTE [DISTWIDTH] IN BLOOD BY AUTOMATED COUNT: 13.8 % (ref 11.5–14.5)
ERYTHROCYTE [DISTWIDTH] IN BLOOD BY AUTOMATED COUNT: 44.6 FL (ref 35–45)
FLU A ANTIGEN: NEGATIVE
FLU B ANTIGEN: NEGATIVE
GFR SERPL CREATININE-BSD FRML MDRD: > 90 ML/MIN/1.73M2
GLUCOSE BLD-MCNC: 268 MG/DL (ref 70–108)
GLUCOSE URINE: >= 1000 MG/DL
HCT VFR BLD CALC: 44.3 % (ref 42–52)
HEMOGLOBIN: 14.5 GM/DL (ref 14–18)
IMMATURE GRANS (ABS): 0.13 THOU/MM3 (ref 0–0.07)
IMMATURE GRANULOCYTES: 1.3 %
INR BLD: 1.03 (ref 0.85–1.13)
KETONES, URINE: 15
LACTIC ACID: 1.9 MMOL/L (ref 0.5–2.2)
LACTIC ACID: 3 MMOL/L (ref 0.5–2.2)
LEUKOCYTE ESTERASE, URINE: NEGATIVE
LIPASE: 14.9 U/L (ref 5.6–51.3)
LYMPHOCYTES # BLD: 18.5 %
LYMPHOCYTES ABSOLUTE: 1.9 THOU/MM3 (ref 1–4.8)
MCH RBC QN AUTO: 29.4 PG (ref 26–33)
MCHC RBC AUTO-ENTMCNC: 32.7 GM/DL (ref 32.2–35.5)
MCV RBC AUTO: 89.7 FL (ref 80–94)
MONOCYTES # BLD: 7 %
MONOCYTES ABSOLUTE: 0.7 THOU/MM3 (ref 0.4–1.3)
NITRITE, URINE: NEGATIVE
NUCLEATED RED BLOOD CELLS: 0 /100 WBC
OSMOLALITY CALCULATION: 284.9 MOSMOL/KG (ref 275–300)
PH UA: 8 (ref 5–9)
PLATELET # BLD: 186 THOU/MM3 (ref 130–400)
PMV BLD AUTO: 10.6 FL (ref 9.4–12.4)
POTASSIUM REFLEX MAGNESIUM: 5.1 MEQ/L (ref 3.5–5.2)
PROTEIN UA: NEGATIVE
RBC # BLD: 4.94 MILL/MM3 (ref 4.7–6.1)
SEG NEUTROPHILS: 71.2 %
SEGMENTED NEUTROPHILS ABSOLUTE COUNT: 7.4 THOU/MM3 (ref 1.8–7.7)
SODIUM BLD-SCNC: 138 MEQ/L (ref 135–145)
SPECIFIC GRAVITY, URINE: 1.02 (ref 1–1.03)
TOTAL PROTEIN: 7.3 G/DL (ref 6.1–8)
TROPONIN T: 0.01 NG/ML
UROBILINOGEN, URINE: 1 EU/DL (ref 0–1)
WBC # BLD: 10.4 THOU/MM3 (ref 4.8–10.8)

## 2020-01-04 PROCEDURE — 6360000002 HC RX W HCPCS: Performed by: EMERGENCY MEDICINE

## 2020-01-04 PROCEDURE — 2580000003 HC RX 258: Performed by: EMERGENCY MEDICINE

## 2020-01-04 PROCEDURE — 87804 INFLUENZA ASSAY W/OPTIC: CPT

## 2020-01-04 PROCEDURE — 99284 EMERGENCY DEPT VISIT MOD MDM: CPT

## 2020-01-04 PROCEDURE — 85610 PROTHROMBIN TIME: CPT

## 2020-01-04 PROCEDURE — 81003 URINALYSIS AUTO W/O SCOPE: CPT

## 2020-01-04 PROCEDURE — 36415 COLL VENOUS BLD VENIPUNCTURE: CPT

## 2020-01-04 PROCEDURE — 96372 THER/PROPH/DIAG INJ SC/IM: CPT

## 2020-01-04 PROCEDURE — 93005 ELECTROCARDIOGRAM TRACING: CPT | Performed by: EMERGENCY MEDICINE

## 2020-01-04 PROCEDURE — 96374 THER/PROPH/DIAG INJ IV PUSH: CPT

## 2020-01-04 PROCEDURE — 80048 BASIC METABOLIC PNL TOTAL CA: CPT

## 2020-01-04 PROCEDURE — 80076 HEPATIC FUNCTION PANEL: CPT

## 2020-01-04 PROCEDURE — 83605 ASSAY OF LACTIC ACID: CPT

## 2020-01-04 PROCEDURE — 2709999900 HC NON-CHARGEABLE SUPPLY

## 2020-01-04 PROCEDURE — 85025 COMPLETE CBC W/AUTO DIFF WBC: CPT

## 2020-01-04 PROCEDURE — 84484 ASSAY OF TROPONIN QUANT: CPT

## 2020-01-04 PROCEDURE — 83690 ASSAY OF LIPASE: CPT

## 2020-01-04 PROCEDURE — 93010 ELECTROCARDIOGRAM REPORT: CPT | Performed by: INTERNAL MEDICINE

## 2020-01-04 RX ORDER — PROMETHAZINE HYDROCHLORIDE 25 MG/ML
25 INJECTION, SOLUTION INTRAMUSCULAR; INTRAVENOUS ONCE
Status: COMPLETED | OUTPATIENT
Start: 2020-01-04 | End: 2020-01-04

## 2020-01-04 RX ORDER — PROMETHAZINE HYDROCHLORIDE 25 MG/1
25 TABLET ORAL EVERY 6 HOURS PRN
Qty: 30 TABLET | Refills: 0 | Status: SHIPPED | OUTPATIENT
Start: 2020-01-04 | End: 2021-02-11

## 2020-01-04 RX ORDER — ONDANSETRON 2 MG/ML
4 INJECTION INTRAMUSCULAR; INTRAVENOUS ONCE
Status: COMPLETED | OUTPATIENT
Start: 2020-01-04 | End: 2020-01-04

## 2020-01-04 RX ORDER — SODIUM CHLORIDE 9 MG/ML
INJECTION, SOLUTION INTRAVENOUS CONTINUOUS
Status: DISCONTINUED | OUTPATIENT
Start: 2020-01-04 | End: 2020-01-04 | Stop reason: HOSPADM

## 2020-01-04 RX ADMIN — ONDANSETRON 4 MG: 2 INJECTION INTRAMUSCULAR; INTRAVENOUS at 09:38

## 2020-01-04 RX ADMIN — SODIUM CHLORIDE: 9 INJECTION, SOLUTION INTRAVENOUS at 09:38

## 2020-01-04 RX ADMIN — PROMETHAZINE HYDROCHLORIDE 25 MG: 25 INJECTION INTRAMUSCULAR; INTRAVENOUS at 11:11

## 2020-01-04 ASSESSMENT — ENCOUNTER SYMPTOMS
COUGH: 0
CONSTIPATION: 0
CHEST TIGHTNESS: 0
VOMITING: 1
SHORTNESS OF BREATH: 0
APNEA: 0
WHEEZING: 0
ABDOMINAL DISTENTION: 0
COLOR CHANGE: 0
NAUSEA: 1
CHOKING: 0
ABDOMINAL PAIN: 0
DIARRHEA: 1

## 2020-01-04 NOTE — ED PROVIDER NOTES
[facility]  eMERGENCY dEPARTMENT eNCOUnter          [address]       Chief Complaint   Patient presents with    Chills    Generalized Body Aches    Emesis       Nurses Notes reviewed and I agree except as noted in the HPI. HISTORY OF PRESENT ILLNESS    Apoorva Ramsey is a 78 y.o. male who presents to the Emergency Department for the evaluation of nausea and vomiting that started suddenly yesterday. The patient also admits to some loose stool, more frequent bowel movements, chills, decreased appetite, and mildly increased leg swelling. He denies abdominal pain, distension, dark urine, decreased urination, and chest pain. He has a history of atrial flutter, diabetes, and hypertension. He states that his sugars have been good but that he has not checked them since yesterday. He denies a history of CHF or liver failure. He has had a cardiac ablation and he states that parts of his left atrium was sealed off by Dr. Melia Rubalcava (cardiolothroacic surgery) to prevent him from long-term anticoagulant use. He has also had a cholecystectomy. He denies having any cardiac stents. His wife was recently sick with URI symptoms. He did have the influenza vaccination this season. No other complaints at this time. The HPI was provided by the patient. REVIEW OF SYSTEMS     Review of Systems   Constitutional: Positive for appetite change (Decreased) and chills. Negative for fever. Respiratory: Negative for apnea, cough, choking, chest tightness, shortness of breath and wheezing. Cardiovascular: Positive for leg swelling. Negative for chest pain and palpitations. Gastrointestinal: Positive for diarrhea (Loose, frequent stools), nausea and vomiting. Negative for abdominal distention, abdominal pain and constipation. Endocrine: Negative for polydipsia and polyuria. Genitourinary: Negative for decreased urine volume, difficulty urinating, dysuria, flank pain, frequency and hematuria. Musculoskeletal: Negative for arthralgias. Skin: Negative for color change, pallor, rash and wound. Neurological: Negative for headaches. Hematological: Negative for adenopathy. PAST MEDICAL HISTORY    has a past medical history of Arthritis, Atrial fibrillation ([facility] [address] Cancer ([facility] [address] Chronic kidney disease, Diabetes mellitus ([facility] [address] History of esophagogastroduodenoscopy (EGD), History of kidney stones, Hyperlipidemia, and Hypertension. SURGICAL HISTORY      has a past surgical history that includes knee surgery (1988???); Rotator cuff repair (2000?? ); Lithotripsy (1990?? -  2016??); other surgical history (5/26/2016); Cardiac catheterization (1995??); Cholecystectomy (1984??); Colonoscopy; skin biopsy; Cystoscopy (05/02/2017); Ureteroscopy (Left, 05/02/2017); Kidney stone surgery (Left, 05/02/2017); back surgery (05/2017); pr office/outpt visit,procedure only (N/A, 7/12/2018); and Lung biopsy.     CURRENT MEDICATIONS       Discharge Medication List as of 1/4/2020 12:49 PM      CONTINUE these medications which have NOT CHANGED    Details   cefdinir (OMNICEF) 300 MG capsule Take 1 capsule by mouth 2 times daily for 10 days, Disp-20 capsule, R-0Print      verapamil (CALAN SR) 180 MG extended release tablet Take 1 tablet by mouth daily, Disp-30 tablet, R-5Normal      celecoxib (CELEBREX) 200 MG capsule TAKE 1 CAPSULE BY MOUTH ONCE DAILY WITH FOOD, R-2Historical Med      Dextromethorphan-guaiFENesin (CORICIDIN HBP CONGESTION/COUGH)  MG CAPS Take 1 capsule by mouth 2 times daily as needed (cough/congestion), Disp-60 capsule, R-0Normal      losartan (COZAAR) 100 MG tablet Take 1 tablet by mouth daily, Disp-30 tablet, R-5Normal      lovastatin (MEVACOR) 20 MG tablet Take 1 tablet by mouth nightly, Disp-90 tablet, R-3Normal      carvedilol (COREG) 6.25 MG tablet Take 6.25 mg by mouth 2 times daily (with meals)Historical Med      furosemide (LASIX) 20 MG tablet Take 1 tablet by mouth daily, Disp-60 tablet, Appearance: He is ill-appearing. He is not diaphoretic. HENT:      Head: Normocephalic and atraumatic. Right Ear: External ear normal.      Left Ear: External ear normal.   Eyes:      General: Scleral icterus (Minimal) present. Conjunctiva/sclera: Conjunctivae normal.   Neck:      Musculoskeletal: Normal range of motion. Cardiovascular:      Rate and Rhythm: Normal rate and regular rhythm. Heart sounds: Normal heart sounds. Pulmonary:      Effort: Pulmonary effort is normal. No respiratory distress. Breath sounds: Normal breath sounds and air entry. Abdominal:      General: Abdomen is flat. Bowel sounds are normal. There is no distension. Palpations: Abdomen is soft. There is no fluid wave, hepatomegaly, splenomegaly or mass. Tenderness: There is no tenderness. There is no right CVA tenderness, left CVA tenderness, guarding or rebound. Negative signs include Romero's sign and McBurney's sign. Hernia: No hernia is present. Musculoskeletal:      Right lower le+ Edema present. Left lower le+ Edema present. Skin:     General: Skin is warm and dry. Coloration: Skin is jaundiced (Mild). Neurological:      Mental Status: He is alert.         DIFFERENTIAL DIAGNOSIS:   Gastroenteritis, hepatitis, pancreatitis, viral illness    DIAGNOSTIC RESULTS     EKG: All EKG's are interpreted by the Emergency Department Physician who either signs or Co-signs this chart in the absence of a cardiologist.        EKG Dizziness (Preliminary result)    Component (Lab Inquiry)   Collection Time Result Time Ventricular Rate Atrial Rate QRS Duration Q-T Interval QTc Calculation (Bazett)   20 10:34:26 20 10:36:02 62 66 94 444 450       Collection Time Result Time R Axis T Axis   20 10:34:26 20 10:36:02 27 37         Preliminary result                Narrative:    Atrial fibrillation  Nonspecific ST abnormality  Abnormal ECG  When compared with ECG of

## 2020-04-14 NOTE — TELEPHONE ENCOUNTER
Zaheer Gutierrez called requesting a refill on the following medications:  Requested Prescriptions     Pending Prescriptions Disp Refills    verapamil (CALAN SR) 180 MG extended release tablet 30 tablet 5     Sig: Take 1 tablet by mouth daily     Pharmacy verified: [facility] on [address] [facility] [address]      Date of last visit: 12/10/19  Date of next visit (if applicable): 6/38/8234    Pt would like a script for 90 day supply called in

## 2020-05-08 ENCOUNTER — TELEPHONE (OUTPATIENT)
Dept: CARDIOLOGY CLINIC | Age: 70
End: 2020-05-08

## 2020-05-27 ENCOUNTER — OFFICE VISIT (OUTPATIENT)
Dept: CARDIOLOGY CLINIC | Age: 70
End: 2020-05-27
Payer: MEDICARE

## 2020-05-27 VITALS
WEIGHT: 315 LBS | SYSTOLIC BLOOD PRESSURE: 138 MMHG | BODY MASS INDEX: 42.66 KG/M2 | HEART RATE: 90 BPM | HEIGHT: 72 IN | DIASTOLIC BLOOD PRESSURE: 80 MMHG

## 2020-05-27 PROCEDURE — 1036F TOBACCO NON-USER: CPT | Performed by: NURSE PRACTITIONER

## 2020-05-27 PROCEDURE — 1123F ACP DISCUSS/DSCN MKR DOCD: CPT | Performed by: NURSE PRACTITIONER

## 2020-05-27 PROCEDURE — 3046F HEMOGLOBIN A1C LEVEL >9.0%: CPT | Performed by: NURSE PRACTITIONER

## 2020-05-27 PROCEDURE — 2022F DILAT RTA XM EVC RTNOPTHY: CPT | Performed by: NURSE PRACTITIONER

## 2020-05-27 PROCEDURE — 93000 ELECTROCARDIOGRAM COMPLETE: CPT | Performed by: NURSE PRACTITIONER

## 2020-05-27 PROCEDURE — 99213 OFFICE O/P EST LOW 20 MIN: CPT | Performed by: NURSE PRACTITIONER

## 2020-05-27 PROCEDURE — 3017F COLORECTAL CA SCREEN DOC REV: CPT | Performed by: NURSE PRACTITIONER

## 2020-05-27 PROCEDURE — G8427 DOCREV CUR MEDS BY ELIG CLIN: HCPCS | Performed by: NURSE PRACTITIONER

## 2020-05-27 PROCEDURE — 4040F PNEUMOC VAC/ADMIN/RCVD: CPT | Performed by: NURSE PRACTITIONER

## 2020-05-27 PROCEDURE — G8417 CALC BMI ABV UP PARAM F/U: HCPCS | Performed by: NURSE PRACTITIONER

## 2020-05-27 NOTE — PROGRESS NOTES
mouth daily 60 tablet 0    ipratropium (ATROVENT) 0.06 % nasal spray 2 sprays by Nasal route daily       aspirin 325 MG EC tablet Take 1 tablet by mouth daily 30 tablet 3    NONFORMULARY Take 1 tablet by mouth 2 times daily Milk Thistle      NONFORMULARY Take 1 capsule by mouth 2 times daily Equate Digestive care probiotic      tamsulosin (FLOMAX) 0.4 MG capsule take 1 capsule by mouth once daily (Patient taking differently: take 1 capsule by mouth once nightly) 90 capsule 3    insulin NPH (HUMULIN N;NOVOLIN N) 100 UNIT/ML injection vial Inject 40 Units into the skin 2 times daily Lunch and bedtime      insulin aspart (NOVOLOG) 100 UNIT/ML injection vial Inject into the skin 4 times daily Glucose  6 units,  101-120 9 units,  121 - 150 12 units,  151-200 15 units,  201 - 250 18 units,  251 -300 24 units,  301-350 30 units, 351-400 36 units.  gabapentin (NEURONTIN) 600 MG tablet Take 600 mg by mouth 3 times daily Indications: Pain       finasteride (PROSCAR) 5 MG tablet   Take 5 mg by mouth daily Prostate      Dextromethorphan-guaiFENesin (CORICIDIN HBP CONGESTION/COUGH)  MG CAPS Take 1 capsule by mouth 2 times daily as needed (cough/congestion) 60 capsule 0     No current facility-administered medications for this visit.         Social History     Socioeconomic History    Marital status:      Spouse name: Not on file    Number of children: Not on file    Years of education: Not on file    Highest education level: Not on file   Occupational History    Not on file   Social Needs    Financial resource strain: Not on file    Food insecurity     Worry: Not on file     Inability: Not on file    Transportation needs     Medical: Not on file     Non-medical: Not on file   Tobacco Use    Smoking status: Never Smoker    Smokeless tobacco: Never Used   Substance and Sexual Activity    Alcohol use: No    Drug use: No    Sexual activity: Yes     Partners: Female   Lifestyle   

## 2021-01-15 ENCOUNTER — OUTSIDE SERVICES (OUTPATIENT)
Dept: FAMILY MEDICINE CLINIC | Age: 71
End: 2021-01-15
Payer: MEDICARE

## 2021-01-15 DIAGNOSIS — I10 ESSENTIAL HYPERTENSION, BENIGN: ICD-10-CM

## 2021-01-15 DIAGNOSIS — E66.9 DIABETES MELLITUS TYPE 2 IN OBESE (HCC): ICD-10-CM

## 2021-01-15 DIAGNOSIS — I48.21 PERMANENT ATRIAL FIBRILLATION (HCC): Primary | ICD-10-CM

## 2021-01-15 DIAGNOSIS — E78.5 HYPERLIPIDEMIA WITH TARGET LDL LESS THAN 70: ICD-10-CM

## 2021-01-15 DIAGNOSIS — E11.69 DIABETES MELLITUS TYPE 2 IN OBESE (HCC): ICD-10-CM

## 2021-01-15 DIAGNOSIS — U07.1 2019 NOVEL CORONAVIRUS DISEASE (COVID-19): ICD-10-CM

## 2021-01-15 PROCEDURE — 99305 1ST NF CARE MODERATE MDM 35: CPT | Performed by: FAMILY MEDICINE

## 2021-01-20 ENCOUNTER — HOSPITAL ENCOUNTER (OUTPATIENT)
Age: 71
Setting detail: SPECIMEN
Discharge: HOME | End: 2021-01-20

## 2021-01-20 LAB — POTASSIUM SERPL-SCNC: 5.3 MMOL/L (ref 3.7–5.3)

## 2021-01-20 PROCEDURE — 84132 ASSAY OF SERUM POTASSIUM: CPT

## 2021-01-21 VITALS
SYSTOLIC BLOOD PRESSURE: 145 MMHG | DIASTOLIC BLOOD PRESSURE: 86 MMHG | TEMPERATURE: 97.8 F | RESPIRATION RATE: 16 BRPM | HEART RATE: 89 BPM | BODY MASS INDEX: 39.74 KG/M2 | WEIGHT: 293 LBS

## 2021-01-21 PROBLEM — U07.1 2019 NOVEL CORONAVIRUS DISEASE (COVID-19): Status: ACTIVE | Noted: 2021-01-21

## 2021-01-21 ASSESSMENT — ENCOUNTER SYMPTOMS
DIARRHEA: 0
WHEEZING: 0
EYE DISCHARGE: 0
BLOOD IN STOOL: 0
SORE THROAT: 0
SHORTNESS OF BREATH: 0
NAUSEA: 0
CONSTIPATION: 0
VOMITING: 0
COUGH: 0
BACK PAIN: 0
EYE REDNESS: 0
EYE PAIN: 0

## 2021-01-21 NOTE — PROGRESS NOTES
admission here. I have reviewed the patient's past medical history, past surgical history, allergies,medications, social and family history and I have made updates where appropriate. Past Medical History:   Diagnosis Date    Arthritis     [facility] ([facility]     Cancer ([facility] [address]     skin    Chronic kidney disease     Diabetes mellitus ([facility] [address]     History of esophagogastroduodenoscopy (EGD) 6/7/16    History of kidney stones     Hyperlipidemia     Hypertension        Past Surgical History:   Procedure Laterality Date    BACK SURGERY  05/2017    CARDIAC CATHETERIZATION  1995??    no stents    CHOLECYSTECTOMY  1984??    COLONOSCOPY      last one 2015??    CYSTOSCOPY  05/02/2017    KIDNEY STONE SURGERY Left 05/02/2017    basket retrievel   [facility]? ??    arthroscopy, left    LITHOTRIPSY  1990?? -  2016?? kidney stones    LUNG BIOPSY      OTHER SURGICAL HISTORY  5/26/2016    LEFT ESWL    [place] OFFICE/OUTPT VISIT,PROCEDURE ONLY N/A 7/12/2018    5--BOX THORASCOPIC MAZE WITH IGGY performed by Josue Foster MD at [address] [place] [address]  2000??    right    SKIN BIOPSY      URETEROSCOPY Left 05/02/2017       Social History     Tobacco Use    Smoking status: Never Smoker    Smokeless tobacco: Never Used   Substance Use Topics    Alcohol use: No    Drug use: No       Family History   Problem Relation Age of Onset    Arthritis Mother     Diabetes Mother     Heart Disease Mother     High Blood Pressure Mother     Stroke Mother     Diabetes Father     High Blood Pressure Father          Review of Systems   Constitutional: Positive for fatigue. Negative for chills and fever. HENT: Negative for congestion, ear pain, nosebleeds, sore throat and tinnitus. Eyes: Negative for pain, discharge and redness. Respiratory: Negative for cough, shortness of breath and wheezing. Cardiovascular: Negative for chest pain, palpitations and leg swelling.    Gastrointestinal: Negative for blood in stool, constipation, diarrhea, nausea and vomiting. Endocrine: Negative for polydipsia. Genitourinary: Negative for dysuria, frequency, hematuria and urgency. Musculoskeletal: Negative for back pain and myalgias. Skin: Negative for rash. Allergic/Immunologic: Negative for environmental allergies. Neurological: Negative for dizziness, tremors, seizures, weakness and headaches. Hematological: Does not bruise/bleed easily. Psychiatric/Behavioral: Negative for hallucinations and suicidal ideas. The patient is not nervous/anxious. PHYSICAL EXAM:    BP (!) 145/86   Pulse 89   Temp 97.8 °F (36.6 °C)   Resp 16   Wt 293 lb (132.9 kg)   BMI 39.74 kg/m²       Physical Exam  Vitals signs and nursing note reviewed. Constitutional:       General: He is not in acute distress. Appearance: Normal appearance. He is well-developed. He is not diaphoretic. HENT:      Head: Normocephalic and atraumatic. Right Ear: Hearing, tympanic membrane and external ear normal.      Left Ear: Hearing, tympanic membrane and external ear normal.      Nose: Nose normal. No septal deviation or rhinorrhea. Mouth/Throat:      Mouth: No oral lesions. Dentition: Normal dentition. Pharynx: Uvula midline. Eyes:      General: Lids are normal.      Extraocular Movements:      Right eye: Normal extraocular motion. Left eye: Normal extraocular motion. Conjunctiva/sclera: Conjunctivae normal.      Right eye: Right conjunctiva is not injected. Left eye: Left conjunctiva is not injected. Pupils: Pupils are equal, round, and reactive to light. Right eye: Pupil is reactive. Left eye: Pupil is reactive. Neck:      Musculoskeletal: Full passive range of motion without pain and neck supple. Thyroid: No thyroid mass or thyromegaly. Trachea: Trachea normal.   Cardiovascular:      Rate and Rhythm: Normal rate and regular rhythm. Pulses: Normal pulses. Heart sounds: S1 normal and S2 normal. No murmur. No friction rub. No gallop. Pulmonary:      Effort: No respiratory distress. Breath sounds: No decreased breath sounds, wheezing, rhonchi or rales. Abdominal:      General: There is no distension. Palpations: Abdomen is soft. There is no mass. Tenderness: There is no abdominal tenderness. There is no guarding or rebound. Hernia: No hernia is present. Genitourinary:     Testes: Normal.   Musculoskeletal: Normal range of motion. General: No tenderness. Comments: No clubbing, cyanosis or edema of the LEs bilaterally   Lymphadenopathy:      Head:      Right side of head: No tonsillar or preauricular adenopathy. Left side of head: No tonsillar or preauricular adenopathy. Cervical: No cervical adenopathy. Upper Body:      Right upper body: No supraclavicular adenopathy. Left upper body: No supraclavicular adenopathy. Skin:     General: Skin is warm and dry. Findings: No abrasion, ecchymosis or erythema. Neurological:      Mental Status: He is alert. Sensory: No sensory deficit. Motor: No tremor or abnormal muscle tone. Comments: 5/5 Strength globally and symmetrically   Psychiatric:         Speech: Speech normal.         Behavior: Behavior normal.         Thought Content: Thought content normal.         Judgment: Judgment normal.         ASSESSMENT & PLAN  Christopher Cruz was seen today for other.     Diagnoses and all orders for this visit:    Permanent atrial fibrillation ([facility] [address]    Essential hypertension, benign    Hyperlipidemia with target LDL less than 70    Diabetes mellitus type 2 in obese ([facility] [address]    2019 novel coronavirus disease (COVID-19)    COVID19:  Cont PT, OT, Decadron  OA:  Cont Celebrex  BPH:  Cont proscar  DM2:  Cont NPH, Lispro  HTN:  Cont meotprolol  AFib:  Cont cardizem  HLD:  Cont lovastatin      All copied or forwarded information in the progress note was verified by me to be accurate at the time of visit  Patient's past medical, surgical, social and family history were reviewed and updated

## 2021-02-05 ENCOUNTER — TELEPHONE (OUTPATIENT)
Dept: UROLOGY | Age: 71
End: 2021-02-05

## 2021-02-05 DIAGNOSIS — R30.0 DYSURIA: Primary | ICD-10-CM

## 2021-02-05 RX ORDER — CEFDINIR 300 MG/1
300 CAPSULE ORAL 2 TIMES DAILY
Qty: 14 CAPSULE | Refills: 0 | Status: SHIPPED | OUTPATIENT
Start: 2021-02-05 | End: 2021-02-11

## 2021-02-05 NOTE — TELEPHONE ENCOUNTER
Check urine culture. Start Omnicef empirically. Schedule OV (ok for virtual) with KUB prior to appt.

## 2021-02-05 NOTE — TELEPHONE ENCOUNTER
Attempted to call the patient. Message left stating to give the urine specimen to the lab and prescription was sent to the pharmacy. Patient to call the office back to schedule the appointment.

## 2021-02-05 NOTE — TELEPHONE ENCOUNTER
Patient was last seen by Verónica Jones CNP 05/30/2018. He feels like he has an UTI. The urine is cloudy and has some discomfort in his right kidney and some burning in his penis. He denies fever and chills. The patient does not get around very well since recovery from covid and uses a walker. Please advise. Thank you.

## 2021-02-08 ENCOUNTER — TELEPHONE (OUTPATIENT)
Dept: UROLOGY | Age: 71
End: 2021-02-08

## 2021-02-08 NOTE — TELEPHONE ENCOUNTER
Set up to see [facility] for a vv on 2-11-21. Home health is coming once a week right now.

## 2021-02-09 NOTE — TELEPHONE ENCOUNTER
Patient stated he is unable to have the urine test completed. He can not get out of the house due to the weather. He did start the [address] and already scheduled the appointment.

## 2021-02-11 ENCOUNTER — VIRTUAL VISIT (OUTPATIENT)
Dept: UROLOGY | Age: 71
End: 2021-02-11
Payer: MEDICARE

## 2021-02-11 DIAGNOSIS — N20.0 KIDNEY STONES: Primary | ICD-10-CM

## 2021-02-11 PROCEDURE — 99442 PR PHYS/QHP TELEPHONE EVALUATION 11-20 MIN: CPT | Performed by: NURSE PRACTITIONER

## 2021-02-11 RX ORDER — METOPROLOL TARTRATE 100 MG/1
100 TABLET ORAL 2 TIMES DAILY
COMMUNITY
Start: 2021-01-09

## 2021-02-11 RX ORDER — DILTIAZEM HYDROCHLORIDE 60 MG/1
60 TABLET, FILM COATED ORAL 3 TIMES DAILY
COMMUNITY

## 2021-02-11 RX ORDER — DOCUSATE SODIUM 100 MG/1
100 CAPSULE, LIQUID FILLED ORAL 2 TIMES DAILY
COMMUNITY

## 2021-02-11 RX ORDER — DILTIAZEM HYDROCHLORIDE 60 MG/1
60 TABLET, FILM COATED ORAL 3 TIMES DAILY
COMMUNITY
Start: 2021-02-03 | End: 2021-02-11

## 2021-02-11 ASSESSMENT — ENCOUNTER SYMPTOMS
BACK PAIN: 0
ABDOMINAL PAIN: 0

## 2021-02-11 NOTE — PROGRESS NOTES
Joana Vaca is a 69 y.o. male evaluated via telephone on 2/11/2021. Consent:  He and/or health care decision maker is aware that that he may receive a bill for this telephone service, depending on his insurance coverage, and has provided verbal consent to proceed: Yes      Documentation:  I communicated with the patient and/or health care decision maker about BPH, kidney stones, UTIs. Details of this discussion including any medical advice provided:                [address]  [address]  [facility] [address]  Dept: [phone]  Loc: [phone]    Visit Date: 2/11/2021        HPI:     Joana Vaca is a 69 y.o. male who presents today for:  Chief Complaint   Patient presents with    Urinary Tract Infection    Follow-up       HPI   Pt seen in follow up for uti, bph, kidney stones. Pt took an old prescription for Cipro that he had at home for UTI symptoms. Reports urine has \"now completely cleared up\". Mr. Ariadna Boyd was on flomax and finasteride chronically for BPH symptoms. Cardiologist stopped the Flomax. Reports wakes only occasionally to urinate. Stream good. No hematuria. No dysuria. Admits to some incomplete emptying. Changes position on the commode to try to empty better. Hx of kidney stones with last ureteroscopic treatment on the left in 2017 by Dr. Lona Fam. Reports at least 4 different surgeries for kidney stones previously. Stone was composed of calcium oxalate.         Current Outpatient Medications   Medication Sig Dispense Refill    cefdinir (OMNICEF) 300 MG capsule Take 1 capsule by mouth 2 times daily for 7 days 14 capsule 0    celecoxib (CELEBREX) 200 MG capsule TAKE 1 CAPSULE BY MOUTH ONCE DAILY WITH FOOD  2    Dextromethorphan-guaiFENesin (CORICIDIN HBP CONGESTION/COUGH)  MG CAPS Take 1 capsule by mouth 2 times daily as needed (cough/congestion) 60 capsule 0    losartan (COZAAR) 100 MG tablet Take 1 tablet by mouth daily 30 tablet 5    lovastatin (MEVACOR) 20 MG tablet Take 1 tablet by mouth nightly 90 tablet 3    carvedilol (COREG) 6.25 MG tablet Take 6.25 mg by mouth 2 times daily (with meals)       furosemide (LASIX) 20 MG tablet Take 1 tablet by mouth daily 60 tablet 0    ipratropium (ATROVENT) 0.06 % nasal spray 2 sprays by Nasal route daily       aspirin 325 MG EC tablet Take 1 tablet by mouth daily 30 tablet 3    NONFORMULARY Take 1 tablet by mouth 2 times daily Milk Thistle      NONFORMULARY Take 1 capsule by mouth 2 times daily Equate Digestive care probiotic      insulin NPH (HUMULIN N;NOVOLIN N) 100 UNIT/ML injection vial Inject 40 Units into the skin 2 times daily Lunch and bedtime      insulin aspart (NOVOLOG) 100 UNIT/ML injection vial Inject into the skin 4 times daily Glucose  6 units,  101-120 9 units,  121 - 150 12 units,  151-200 15 units,  201 - 250 18 units,  251 -300 24 units,  301-350 30 units, 351-400 36 units.  gabapentin (NEURONTIN) 600 MG tablet Take 600 mg by mouth 3 times daily Indications: Pain       finasteride (PROSCAR) 5 MG tablet   Take 5 mg by mouth daily Prostate       No current facility-administered medications for this visit. Past Medical History  Florence Abdalla  has a past medical history of Arthritis, Atrial fibrillation ([facility] [address] Cancer ([facility] [address] Chronic kidney disease, Diabetes mellitus ([facility] [address] History of esophagogastroduodenoscopy (EGD), History of kidney stones, Hyperlipidemia, and Hypertension. Past Surgical History  The patient  has a past surgical history that includes knee surgery (1988???); Rotator cuff repair (2000?? ); Lithotripsy (1990?? -  2016??); other surgical history (5/26/2016); Cardiac catheterization (1995??); Cholecystectomy (1984??); Colonoscopy; skin biopsy; Cystoscopy (05/02/2017); Ureteroscopy (Left, 05/02/2017);  Kidney stone surgery (Left, 05/02/2017); back surgery (05/2017); pr office/outpt identification was verified at the start of the visit: Yes    Total Time: minutes: 11-20 minutes    Note: not billable if this call serves to triage the patient into an appointment for the relevant concern      Sarahi GUZMÁN

## 2021-02-16 ENCOUNTER — TELEPHONE (OUTPATIENT)
Dept: UROLOGY | Age: 71
End: 2021-02-16

## 2021-02-16 DIAGNOSIS — R30.0 DYSURIA: Primary | ICD-10-CM

## 2021-02-16 RX ORDER — TAMSULOSIN HYDROCHLORIDE 0.4 MG/1
0.4 CAPSULE ORAL DAILY
Qty: 90 CAPSULE | Refills: 1 | Status: SHIPPED | OUTPATIENT
Start: 2021-02-16 | End: 2021-09-16 | Stop reason: SDUPTHER

## 2021-02-16 RX ORDER — TAMSULOSIN HYDROCHLORIDE 0.4 MG/1
0.4 CAPSULE ORAL DAILY
Qty: 30 CAPSULE | Refills: 0 | Status: SHIPPED | OUTPATIENT
Start: 2021-02-16 | End: 2021-09-16

## 2021-02-16 NOTE — TELEPHONE ENCOUNTER
Patient was notified to give sample tomorrow for UA and Culture. Will go to [facility] tomorrow once his driveway is clear. Also notified that refills have been sent and to keep follow up appt.

## 2021-02-16 NOTE — TELEPHONE ENCOUNTER
Patient called into office. He would like to get started back on Flomax. He feels as if he is not emptying all the way 30 day needs to go to [facility] and 90 day [facility]. Since last visit by VV he has noticed more now of feeling of incomplete emptying. Has noticed cloudy urine but denies fever and or chills. Was wanting to go give sample tomorrow at urgent care to see if he has a UTI.

## 2021-02-18 ENCOUNTER — HOSPITAL ENCOUNTER (OUTPATIENT)
Age: 71
Discharge: HOME | End: 2021-02-18
Payer: MEDICARE

## 2021-02-18 DIAGNOSIS — R30.0 DYSURIA: ICD-10-CM

## 2021-02-18 LAB
BACTERIA: ABNORMAL
BILIRUBIN URINE: NEGATIVE
BLOOD, URINE: ABNORMAL
CASTS: ABNORMAL /LPF
CASTS: ABNORMAL /LPF
CHARACTER, URINE: ABNORMAL
COLOR: YELLOW
CRYSTALS: ABNORMAL
EPITHELIAL CELLS, UA: ABNORMAL /HPF
GLUCOSE, URINE: >= 1000 MG/DL
KETONES, URINE: NEGATIVE
LEUKOCYTE ESTERASE, URINE: ABNORMAL
MISCELLANEOUS LAB TEST RESULT: ABNORMAL
NITRITE, URINE: NEGATIVE
PH UA: 6 (ref 5–9)
PROTEIN UA: 30 MG/DL
RBC URINE: ABNORMAL /HPF
RENAL EPITHELIAL, UA: ABNORMAL
SPECIFIC GRAVITY UA: > 1.03 (ref 1–1.03)
UROBILINOGEN, URINE: 1 EU/DL (ref 0–1)
WBC UA: > 200 /HPF
YEAST: ABNORMAL

## 2021-02-18 PROCEDURE — 87077 CULTURE AEROBIC IDENTIFY: CPT

## 2021-02-18 PROCEDURE — 81001 URINALYSIS AUTO W/SCOPE: CPT

## 2021-02-18 PROCEDURE — 87086 URINE CULTURE/COLONY COUNT: CPT

## 2021-02-18 PROCEDURE — 87186 SC STD MICRODIL/AGAR DIL: CPT

## 2021-02-19 LAB
ORGANISM: ABNORMAL
URINE CULTURE, ROUTINE: ABNORMAL

## 2021-02-22 ENCOUNTER — TELEPHONE (OUTPATIENT)
Dept: UROLOGY | Age: 71
End: 2021-02-22

## 2021-02-22 RX ORDER — CIPROFLOXACIN 500 MG/1
500 TABLET, FILM COATED ORAL 2 TIMES DAILY
Qty: 14 TABLET | Refills: 0 | Status: SHIPPED | OUTPATIENT
Start: 2021-02-22 | End: 2021-03-01

## 2021-02-22 NOTE — TELEPHONE ENCOUNTER
Patient is calling for the urine results. He still has cloudy urine and a foul smell. It has not seen any improvement. He has occasional burning on the tip of the penis. He denies fever or chills. Please advise. Thank you.

## 2021-03-04 ENCOUNTER — NURSE ONLY (OUTPATIENT)
Dept: UROLOGY | Age: 71
End: 2021-03-04

## 2021-03-04 VITALS — TEMPERATURE: 97 F

## 2021-03-04 DIAGNOSIS — R30.0 DYSURIA: Primary | ICD-10-CM

## 2021-03-04 LAB
BILIRUBIN URINE: NEGATIVE
BLOOD URINE, POC: NEGATIVE
CHARACTER, URINE: CLEAR
COLOR, URINE: YELLOW
GLUCOSE URINE: >= 1000 MG/DL
KETONES, URINE: NEGATIVE
LEUKOCYTE CLUMPS, URINE: NEGATIVE
NITRITE, URINE: NEGATIVE
PH, URINE: 7.5 (ref 5–9)
POST VOID RESIDUAL (PVR): 9 ML
PROTEIN, URINE: NEGATIVE MG/DL
SPECIFIC GRAVITY, URINE: 1.02 (ref 1–1.03)
UROBILINOGEN, URINE: 0.2 EU/DL (ref 0–1)

## 2021-03-04 PROCEDURE — 81003 URINALYSIS AUTO W/O SCOPE: CPT | Performed by: NURSE PRACTITIONER

## 2021-03-04 PROCEDURE — 51798 US URINE CAPACITY MEASURE: CPT | Performed by: NURSE PRACTITIONER

## 2021-03-04 RX ORDER — TIZANIDINE 2 MG/1
TABLET ORAL
COMMUNITY
Start: 2021-03-01

## 2021-03-04 NOTE — PROGRESS NOTES
I have personally verified, reviewed, released, signed, authenticated, authorized, confirmed,finalized, and approved the actions of the [facility]. Pt to continue flomax 0.4 mg daily. Urine dip negative. If further infections will check imaging now to rule out stones. F/u in 6 months ith PVR with KUB prior.

## 2021-03-04 NOTE — PROGRESS NOTES
Patient here for PVR check and UA. PVR is 9 mL. Per Ok PROCTOR patient is to continue to take flomax daily and follow up in August as scheduled with a KUB prior. Patient verbalized and understood and will call if needed before appointment.

## 2021-03-05 ENCOUNTER — HOSPITAL ENCOUNTER (OUTPATIENT)
Age: 71
Discharge: HOME | End: 2021-03-05
Payer: MEDICARE

## 2021-03-05 ENCOUNTER — HOSPITAL ENCOUNTER (OUTPATIENT)
Dept: GENERAL RADIOLOGY | Age: 71
Discharge: HOME | End: 2021-03-05
Payer: MEDICARE

## 2021-03-05 DIAGNOSIS — M54.12 CERVICAL RADICULOPATHY: ICD-10-CM

## 2021-03-05 PROCEDURE — 72040 X-RAY EXAM NECK SPINE 2-3 VW: CPT

## 2021-03-16 ENCOUNTER — HOSPITAL ENCOUNTER (OUTPATIENT)
Age: 71
Discharge: HOME | End: 2021-03-16
Payer: MEDICARE

## 2021-03-16 ENCOUNTER — TELEPHONE (OUTPATIENT)
Dept: UROLOGY | Age: 71
End: 2021-03-16

## 2021-03-16 DIAGNOSIS — R82.90 CLOUDY URINE: ICD-10-CM

## 2021-03-16 DIAGNOSIS — R30.0 DYSURIA: Primary | ICD-10-CM

## 2021-03-16 DIAGNOSIS — R30.0 DYSURIA: ICD-10-CM

## 2021-03-16 PROCEDURE — 81001 URINALYSIS AUTO W/SCOPE: CPT

## 2021-03-16 PROCEDURE — 87077 CULTURE AEROBIC IDENTIFY: CPT

## 2021-03-16 PROCEDURE — 87186 SC STD MICRODIL/AGAR DIL: CPT

## 2021-03-16 PROCEDURE — 87086 URINE CULTURE/COLONY COUNT: CPT

## 2021-03-16 RX ORDER — CIPROFLOXACIN 500 MG/1
500 TABLET, FILM COATED ORAL 2 TIMES DAILY
Qty: 28 TABLET | Refills: 0 | Status: SHIPPED | OUTPATIENT
Start: 2021-03-16 | End: 2021-03-30

## 2021-03-16 NOTE — TELEPHONE ENCOUNTER
Patient advised to give the urine sample to the lab and cipro was sent to the pharmacy. He voiced understanding. [address] stated the cipro is contraindicated with zanaflex. It causes decrease in blood pressure. Please advise. Thank you.

## 2021-03-16 NOTE — TELEPHONE ENCOUNTER
Manda at [place] aware patient to take zanaflex as needed and not to take with cipro. Attempted to call the patient.  Voicemail left to return the call to the office

## 2021-03-17 LAB
BACTERIA: ABNORMAL
BILIRUBIN URINE: NEGATIVE
BLOOD, URINE: NEGATIVE
CASTS: ABNORMAL /LPF
CASTS: ABNORMAL /LPF
CHARACTER, URINE: ABNORMAL
COLOR: YELLOW
CRYSTALS: ABNORMAL
EPITHELIAL CELLS, UA: ABNORMAL /HPF
GLUCOSE, URINE: >= 1000 MG/DL
KETONES, URINE: NEGATIVE
LEUKOCYTE ESTERASE, URINE: ABNORMAL
MISCELLANEOUS LAB TEST RESULT: ABNORMAL
NITRITE, URINE: NEGATIVE
PH UA: 5.5 (ref 5–9)
PROTEIN UA: NEGATIVE MG/DL
RBC URINE: ABNORMAL /HPF
RENAL EPITHELIAL, UA: ABNORMAL
SPECIFIC GRAVITY UA: 1.02 (ref 1–1.03)
UROBILINOGEN, URINE: 1 EU/DL (ref 0–1)
WBC UA: ABNORMAL /HPF
YEAST: ABNORMAL

## 2021-03-18 LAB
ORGANISM: ABNORMAL
URINE CULTURE, ROUTINE: ABNORMAL

## 2021-03-19 NOTE — TELEPHONE ENCOUNTER
Patient advised of the urine results. He voiced understanding to take the cipro until completed and to have the KUB completed.

## 2021-03-19 NOTE — TELEPHONE ENCOUNTER
Pt's urine culture significant for infection sensitive to the Cipro. He should complete the full 2 week course. Please advise him to call if symptoms fail to improve or worsen after completion of antibiotics. Also please have him complete a KUB now to eval for any change in stone burden contributing to his infections as this is at least his 3rd infection in 2 months. We will call results.

## 2021-04-05 ENCOUNTER — TELEPHONE (OUTPATIENT)
Dept: UROLOGY | Age: 71
End: 2021-04-05

## 2021-04-05 ENCOUNTER — HOSPITAL ENCOUNTER (OUTPATIENT)
Age: 71
Discharge: HOME | End: 2021-04-05
Payer: MEDICARE

## 2021-04-05 ENCOUNTER — HOSPITAL ENCOUNTER (OUTPATIENT)
Dept: GENERAL RADIOLOGY | Age: 71
Discharge: HOME | End: 2021-04-05
Payer: MEDICARE

## 2021-04-05 DIAGNOSIS — N20.0 KIDNEY STONES: ICD-10-CM

## 2021-04-05 LAB — DIGOXIN LEVEL: 0.3 NG/ML (ref 0.5–2)

## 2021-04-05 PROCEDURE — 80162 ASSAY OF DIGOXIN TOTAL: CPT

## 2021-04-05 PROCEDURE — 74018 RADEX ABDOMEN 1 VIEW: CPT

## 2021-04-05 PROCEDURE — 36415 COLL VENOUS BLD VENIPUNCTURE: CPT

## 2021-04-05 NOTE — TELEPHONE ENCOUNTER
----- Message from [facility], APRN - CNP sent at 4/5/2021  3:29 PM EDT -----  This was not my order. Please make sure the appropriate provider receives the result.   Thank-you.  ----- Message -----  From: Kerry Reveles Incoming Lab Results From Soft  Sent: 4/5/2021  11:10 AM EDT  To: [facility] CNP

## 2021-06-24 ENCOUNTER — HOSPITAL ENCOUNTER (OUTPATIENT)
Dept: GENERAL RADIOLOGY | Age: 71
Discharge: HOME | End: 2021-06-24
Payer: MEDICARE

## 2021-06-24 ENCOUNTER — HOSPITAL ENCOUNTER (OUTPATIENT)
Age: 71
Discharge: HOME | End: 2021-06-24
Payer: MEDICARE

## 2021-06-24 DIAGNOSIS — Z87.01 HISTORY OF PNEUMONIA: ICD-10-CM

## 2021-06-24 DIAGNOSIS — R06.02 SOB (SHORTNESS OF BREATH): ICD-10-CM

## 2021-06-24 PROCEDURE — 71046 X-RAY EXAM CHEST 2 VIEWS: CPT

## 2021-08-05 ENCOUNTER — TELEPHONE (OUTPATIENT)
Dept: UROLOGY | Age: 71
End: 2021-08-05

## 2021-08-12 ENCOUNTER — OFFICE VISIT (OUTPATIENT)
Dept: UROLOGY | Age: 71
End: 2021-08-12
Payer: MEDICARE

## 2021-08-12 VITALS — WEIGHT: 293 LBS | HEIGHT: 72 IN | RESPIRATION RATE: 17 BRPM | BODY MASS INDEX: 39.68 KG/M2

## 2021-08-12 DIAGNOSIS — R30.0 DYSURIA: Primary | ICD-10-CM

## 2021-08-12 LAB
BILIRUBIN URINE: NEGATIVE
BLOOD URINE, POC: NEGATIVE
CHARACTER, URINE: CLEAR
COLOR, URINE: YELLOW
GLUCOSE URINE: >= 1000 MG/DL
KETONES, URINE: NEGATIVE
LEUKOCYTE CLUMPS, URINE: NEGATIVE
NITRITE, URINE: NEGATIVE
PH, URINE: 5 (ref 5–9)
POST VOID RESIDUAL (PVR): 0 ML
PROTEIN, URINE: NEGATIVE MG/DL
SPECIFIC GRAVITY, URINE: 1.01 (ref 1–1.03)
UROBILINOGEN, URINE: 0.2 EU/DL (ref 0–1)

## 2021-08-12 PROCEDURE — 51798 US URINE CAPACITY MEASURE: CPT | Performed by: NURSE PRACTITIONER

## 2021-08-12 PROCEDURE — 99213 OFFICE O/P EST LOW 20 MIN: CPT | Performed by: NURSE PRACTITIONER

## 2021-08-12 RX ORDER — AMLODIPINE BESYLATE 5 MG/1
1 TABLET ORAL DAILY
COMMUNITY
Start: 2021-08-06

## 2021-08-12 ASSESSMENT — ENCOUNTER SYMPTOMS
VOMITING: 0
BACK PAIN: 0
NAUSEA: 0
ABDOMINAL PAIN: 0

## 2021-08-12 NOTE — PROGRESS NOTES
[address] [facility].  [address]  [facility] [address]  Dept: [phone]  Loc: [phone]    Visit Date: 8/12/2021        HPI:     Deanna Rice is a 66 y.o. male who presents today for:  Chief Complaint   Patient presents with    Benign Prostatic Hypertrophy    Urinary Frequency    Nephrolithiasis       HPI   Pt seen in follow up for kidney stones and BPH. Pt reports he usually sleeps through the night without needing to awaken to urinate. Pt reports urinary frequency after diuretics but overall feels like urination is going well. Pt continues on finasteride 5 mg and flomax 0.4 mg daily. Denies hematuria, dysuria, abdominal or back pain.        Current Outpatient Medications   Medication Sig Dispense Refill    amLODIPine (NORVASC) 5 MG tablet Take 1 tablet by mouth daily      tiZANidine (ZANAFLEX) 2 MG tablet TAKE 1 TABLET BY MOUTH ONCE DAILY AS NEEDED AT NIGHT FOR 30 DAYS      tamsulosin (FLOMAX) 0.4 MG capsule Take 1 capsule by mouth daily 30 capsule 0    tamsulosin (FLOMAX) 0.4 MG capsule Take 1 capsule by mouth daily 90 capsule 1    metoprolol (LOPRESSOR) 100 MG tablet Take 100 mg by mouth 2 times daily      dilTIAZem (CARDIZEM) 60 MG tablet Take 60 mg by mouth 3 times daily      docusate sodium (COLACE) 100 MG capsule Take 100 mg by mouth 2 times daily      celecoxib (CELEBREX) 200 MG capsule TAKE 1 CAPSULE BY MOUTH ONCE DAILY WITH FOOD  2    lovastatin (MEVACOR) 20 MG tablet Take 1 tablet by mouth nightly 90 tablet 3    furosemide (LASIX) 20 MG tablet Take 1 tablet by mouth daily (Patient taking differently: Take 20 mg by mouth 2 times daily ) 60 tablet 0    ipratropium (ATROVENT) 0.06 % nasal spray 2 sprays by Nasal route daily       aspirin 325 MG EC tablet Take 1 tablet by mouth daily 30 tablet 3    NONFORMULARY Take 1 tablet by mouth 2 times daily Milk Thistle      NONFORMULARY Take 1 capsule by mouth 2 times daily Equate Digestive care probiotic      insulin NPH (HUMULIN N;NOVOLIN N) 100 UNIT/ML injection vial Inject 40 Units into the skin 2 times daily Lunch and bedtime      insulin aspart (NOVOLOG) 100 UNIT/ML injection vial Inject into the skin 4 times daily Glucose  6 units,  101-120 9 units,  121 - 150 12 units,  151-200 15 units,  201 - 250 18 units,  251 -300 24 units,  301-350 30 units, 351-400 36 units.  gabapentin (NEURONTIN) 600 MG tablet Take 600 mg by mouth 3 times daily Indications: Pain       finasteride (PROSCAR) 5 MG tablet   Take 5 mg by mouth daily Prostate       No current facility-administered medications for this visit. Past Medical History  Margaret London  has a past medical history of Arthritis, Atrial fibrillation ([facility] [address] Cancer ([facility] [address] Chronic kidney disease, Diabetes mellitus ([facility] [address] History of esophagogastroduodenoscopy (EGD), History of kidney stones, Hyperlipidemia, and Hypertension. Past Surgical History  The patient  has a past surgical history that includes knee surgery (1988???); Rotator cuff repair (2000?? ); Lithotripsy (1990?? -  2016??); other surgical history (5/26/2016); Cardiac catheterization (1995??); Cholecystectomy (1984??); Colonoscopy; skin biopsy; Cystoscopy (05/02/2017); Ureteroscopy (Left, 05/02/2017); Kidney stone surgery (Left, 05/02/2017); back surgery (05/2017); pr office/outpt visit,procedure only (N/A, 7/12/2018); and Lung biopsy. Family History  This patient's family history includes Arthritis in his mother; Diabetes in his father and mother; Heart Disease in his mother; High Blood Pressure in his father and mother; Stroke in his mother. Social History  Margaret London  reports that he has never smoked. He has never used smokeless tobacco. He reports that he does not drink alcohol and does not use drugs.       Subjective:      Review of Systems   Constitutional: Negative for activity change, appetite change, chills, diaphoresis, fatigue, fever and unexpected weight change. Gastrointestinal: Negative for abdominal pain, nausea and vomiting. Genitourinary: Negative for decreased urine volume, difficulty urinating, dysuria, flank pain, frequency, hematuria and urgency. Musculoskeletal: Negative for back pain. Objective:   Resp 17   Ht 6' (1.829 m)   Wt 293 lb (132.9 kg)   BMI 39.74 kg/m²     Physical Exam  Vitals reviewed. Constitutional:       General: He is not in acute distress. Appearance: Normal appearance. He is well-developed. He is not ill-appearing or diaphoretic. HENT:      Head: Normocephalic and atraumatic. Right Ear: External ear normal.      Left Ear: External ear normal.      Nose: Nose normal.      Mouth/Throat:      Mouth: Mucous membranes are moist.   Eyes:      General: No scleral icterus. Right eye: No discharge. Left eye: No discharge. Neck:      Vascular: No JVD. Trachea: No tracheal deviation. Cardiovascular:      Heart sounds: Normal heart sounds. Pulmonary:      Effort: Pulmonary effort is normal. No respiratory distress. Breath sounds: Normal breath sounds. Abdominal:      General: There is no distension. Tenderness: There is no abdominal tenderness. There is no right CVA tenderness or left CVA tenderness. Musculoskeletal:         General: Normal range of motion. Neurological:      Mental Status: He is alert and oriented to person, place, and time. Mental status is at baseline. Psychiatric:         Mood and Affect: Mood normal.         Behavior: Behavior normal.         Thought Content:  Thought content normal.         POC  Results for POC orders placed in visit on 08/12/21   POCT Urinalysis No Micro (Auto)   Result Value Ref Range    Glucose, Ur >= 1000 (A) NEGATIVE mg/dl    Bilirubin Urine Negative     Ketones, Urine Negative NEGATIVE    Specific Gravity, Urine 1.015 1.002 - 1.030    Blood, UA POC Negative NEGATIVE    pH, Urine 5.00 5.0 - 9.0    Protein, Urine Negative NEGATIVE mg/dl    Urobilinogen, Urine 0.20 0.0 - 1.0 eu/dl    Nitrite, Urine Negative NEGATIVE    Leukocyte Clumps, Urine Negative NEGATIVE    Color, Urine Yellow [id number]    Character, Urine Clear [id number]   poct post void residual   Result Value Ref Range    post void residual 0 ml         Patients recent PSA values are as follows  Lab Results   Component Value Date    PSA 0.17 02/05/2018    PSA 0.12 12/19/2016    PSA 0.13 11/20/2014        Recent BUN/Creatinine:  Lab Results   Component Value Date    BUN 12 01/04/2020    CREATININE 0.8 01/04/2020       Radiology  The patient has had a KUB 4/5/21 which I have reviewed along with its accompanying report. The study demonstrates no calculi over the kidneys or ureters. Assessment:   BPH with LUTs  Recurrent UTIs  Hx kidney stones    Plan:     Pt doing well. PVR 0 mls. UA negative for infection. Recent KUB without obvious calculi. F/u in 1 year with PVR.

## 2021-08-12 NOTE — PROGRESS NOTES
Patient had an allergic reaction to a blood pressure medication, he does not know what it is called. He forgot to bring list today.

## 2021-09-16 ENCOUNTER — TELEPHONE (OUTPATIENT)
Dept: UROLOGY | Age: 71
End: 2021-09-16

## 2021-09-16 RX ORDER — TAMSULOSIN HYDROCHLORIDE 0.4 MG/1
0.4 CAPSULE ORAL DAILY
Qty: 90 CAPSULE | Refills: 3 | Status: SHIPPED | OUTPATIENT
Start: 2021-09-16

## 2021-09-16 NOTE — TELEPHONE ENCOUNTER
[facility]     This patient last seen you on 8/12/2.  He is requesting a refill on Flomax and it will need printed and I will fax it to the [facility]     Please advise thank you

## 2023-08-12 NOTE — ED NOTES
Pt resting in bed. Denies needs at this time. Updated on POC. Family remains at bedside. VSS. Call light in reach. Will monitor.       Rae Arreaga RN  08/14/18 5900
bite, insect

## 2024-02-14 ENCOUNTER — HOSPITAL ENCOUNTER (OUTPATIENT)
Age: 74
Discharge: HOME | End: 2024-02-16
Payer: OTHER GOVERNMENT

## 2024-02-14 ENCOUNTER — HOSPITAL ENCOUNTER (OUTPATIENT)
Age: 74
Discharge: HOME | End: 2024-02-14
Payer: OTHER GOVERNMENT

## 2024-02-14 VITALS — HEIGHT: 72 IN | BODY MASS INDEX: 36.03 KG/M2 | WEIGHT: 266 LBS

## 2024-02-14 DIAGNOSIS — I50.9 ACUTE HEART FAILURE, UNSPECIFIED HEART FAILURE TYPE (HCC): ICD-10-CM

## 2024-02-14 LAB
ALBUMIN SERPL BCG-MCNC: 4 G/DL (ref 3.5–5.1)
ALP SERPL-CCNC: 99 U/L (ref 38–126)
ALT SERPL W/O P-5'-P-CCNC: 12 U/L (ref 11–66)
ANION GAP SERPL CALC-SCNC: 13 MEQ/L (ref 8–16)
AST SERPL-CCNC: 23 U/L (ref 5–40)
BILIRUB SERPL-MCNC: 0.8 MG/DL (ref 0.3–1.2)
BILIRUB UR QL STRIP: NEGATIVE
BUN SERPL-MCNC: 14 MG/DL (ref 7–22)
CALCIUM SERPL-MCNC: 9.5 MG/DL (ref 8.5–10.5)
CHARACTER UR: CLEAR
CHLORIDE SERPL-SCNC: 103 MEQ/L (ref 98–111)
CO2 SERPL-SCNC: 29 MEQ/L (ref 23–33)
COLOR UR: YELLOW
CREAT SERPL-MCNC: 1 MG/DL (ref 0.4–1.2)
ECHO AV CUSP MM: 2.2 CM
ECHO AV PEAK GRADIENT: 2 MMHG
ECHO AV PEAK VELOCITY: 0.8 M/S
ECHO AV VELOCITY RATIO: 0.75
ECHO BSA: 2.48 M2
ECHO LA AREA 2C: 22.7 CM2
ECHO LA AREA 4C: 21.3 CM2
ECHO LA DIAMETER INDEX: 1.92 CM/M2
ECHO LA DIAMETER: 4.6 CM
ECHO LA MAJOR AXIS: 5 CM
ECHO LA MINOR AXIS: 5.4 CM
ECHO LA VOL BP: 77 ML (ref 18–58)
ECHO LA VOL MOD A2C: 77 ML (ref 18–58)
ECHO LA VOL MOD A4C: 73 ML (ref 18–58)
ECHO LA VOL/BSA BIPLANE: 32 ML/M2 (ref 16–34)
ECHO LA VOLUME INDEX MOD A2C: 32 ML/M2 (ref 16–34)
ECHO LA VOLUME INDEX MOD A4C: 30 ML/M2 (ref 16–34)
ECHO LV EDV A2C: 93 ML
ECHO LV EDV A4C: 85 ML
ECHO LV EDV INDEX A4C: 35 ML/M2
ECHO LV EDV NDEX A2C: 39 ML/M2
ECHO LV EJECTION FRACTION A2C: 37 %
ECHO LV EJECTION FRACTION A4C: 38 %
ECHO LV EJECTION FRACTION BIPLANE: 37 % (ref 55–100)
ECHO LV ESV A2C: 59 ML
ECHO LV ESV A4C: 52 ML
ECHO LV ESV INDEX A2C: 25 ML/M2
ECHO LV ESV INDEX A4C: 22 ML/M2
ECHO LV FRACTIONAL SHORTENING: 15 % (ref 28–44)
ECHO LV INTERNAL DIMENSION DIASTOLE INDEX: 2 CM/M2
ECHO LV INTERNAL DIMENSION DIASTOLIC: 4.8 CM (ref 4.2–5.9)
ECHO LV INTERNAL DIMENSION SYSTOLIC INDEX: 1.71 CM/M2
ECHO LV INTERNAL DIMENSION SYSTOLIC: 4.1 CM
ECHO LV IVSD: 1 CM (ref 0.6–1)
ECHO LV MASS 2D: 181.9 G (ref 88–224)
ECHO LV MASS INDEX 2D: 75.8 G/M2 (ref 49–115)
ECHO LV POSTERIOR WALL DIASTOLIC: 1.1 CM (ref 0.6–1)
ECHO LV RELATIVE WALL THICKNESS RATIO: 0.46
ECHO LVOT PEAK GRADIENT: 1 MMHG
ECHO LVOT PEAK VELOCITY: 0.6 M/S
ECHO MV E DECELERATION TIME (DT): 137 MS
ECHO MV E VELOCITY: 0.81 M/S
ECHO MV REGURGITANT PEAK GRADIENT: 64 MMHG
ECHO MV REGURGITANT PEAK VELOCITY: 4 M/S
ECHO PULMONARY ARTERY END DIASTOLIC PRESSURE: 6 MMHG
ECHO PV MAX VELOCITY: 0.5 M/S
ECHO PV PEAK GRADIENT: 1 MMHG
ECHO PV REGURGITANT MAX VELOCITY: 1.2 M/S
ECHO RV INTERNAL DIMENSION: 2.9 CM
ECHO TV E WAVE: 0.4 M/S
ECHO TV REGURGITANT MAX VELOCITY: 2.4 M/S
ECHO TV REGURGITANT PEAK GRADIENT: 23 MMHG
GFR SERPL CREATININE-BSD FRML MDRD: > 60 ML/MIN/1.73M2
GLUCOSE SERPL-MCNC: 76 MG/DL (ref 70–108)
GLUCOSE UR QL STRIP.AUTO: >= 1000 MG/DL
HGB UR QL STRIP.AUTO: NEGATIVE
KETONES UR QL STRIP.AUTO: NEGATIVE
LEUKOCYTE ESTERASE UR QL STRIP.AUTO: NEGATIVE
NITRITE UR QL STRIP.AUTO: NEGATIVE
PH UR STRIP.AUTO: 5.5 [PH] (ref 5–9)
POTASSIUM SERPL-SCNC: 4.2 MEQ/L (ref 3.5–5.2)
PROT SERPL-MCNC: 7.4 G/DL (ref 6.1–8)
PROT UR STRIP.AUTO-MCNC: NEGATIVE MG/DL
SODIUM SERPL-SCNC: 145 MEQ/L (ref 135–145)
SP GR UR REFRACT.AUTO: > 1.03 (ref 1–1.03)
UROBILINOGEN UR QL STRIP.AUTO: 1 EU/DL (ref 0–1)

## 2024-02-14 PROCEDURE — 81003 URINALYSIS AUTO W/O SCOPE: CPT

## 2024-02-14 PROCEDURE — 80053 COMPREHEN METABOLIC PANEL: CPT

## 2024-02-14 PROCEDURE — 93306 TTE W/DOPPLER COMPLETE: CPT

## 2024-02-14 PROCEDURE — 36415 COLL VENOUS BLD VENIPUNCTURE: CPT

## (undated) DEVICE — SUTURE SOFSILK SZ 2 L30IN NONABSORBABLE WHT V-26 L37MM 1/2 CS746

## (undated) DEVICE — GLOVE SURG SZ 6 THK91MIL LTX FREE SYN POLYISOPRENE ANTI

## (undated) DEVICE — SUTURE SOFSILK SZ 1 L30IN NABSORBABLE BLK C-17 L39MM 3/8 SS646

## (undated) DEVICE — SUTURE TEMP PACE WIRE SZ 2-0 L24IN NONABSORBABLE DK BLU SH

## (undated) DEVICE — SYRINGE, LUER SLIP, 30ML: Brand: MEDLINE

## (undated) DEVICE — SUTURE PROL SZ 5-0 L36IN NONABSORBABLE BLU L17MM RB-1 1/2 8556H

## (undated) DEVICE — BLADE LARYNSCP SZ 4 ENH DIR INTUB GLIDESCOPE MCGRATH MAC

## (undated) DEVICE — CONTAINER,SPECIMEN,PNEU TUBE,4OZ,OR STRL: Brand: MEDLINE

## (undated) DEVICE — DRAIN SURG L3/8-1/2IN DIA3/16IN SIL CARD CONN 1:1 BLAK

## (undated) DEVICE — CHLORAPREP 26ML CLEAR

## (undated) DEVICE — ADULT (STERILE), RADIOTRANSLUCENT ELEMENT: Brand: DEFIBRILLATION ELECTRODES

## (undated) DEVICE — TUBING IV STOPCOCK 48 CM 3 W

## (undated) DEVICE — 3M™ RANGER™ BLOOD/FLUID WARMING STANDARD FLOW SET, 24250, 10/CASE: Brand: 3M™ RANGER™

## (undated) DEVICE — DISSECTOR ARTIC LUMITIP W/TRANS GUIDE

## (undated) DEVICE — SUTURE ENDOLOOP SZ 0 L18IN ABSRB VLT LIG SLDE KNOT VCRL

## (undated) DEVICE — DRESSING GRMCDL 6 12FR D1N CNTR HOLE 4MM ANTMCRBL PRTCTVE DI

## (undated) DEVICE — GOWN,SIRUS,NON REINFRCD,LARGE,SET IN SL: Brand: MEDLINE

## (undated) DEVICE — SHEARS ENDOSCP L36CM DIA5MM ULTRASONIC CRV TIP W/ ADV

## (undated) DEVICE — PEN ISOLATOR ELECTRD L7MM BPLR PEN 33CM LNG TRANSPOLAR

## (undated) DEVICE — Device: Brand: MOUTHPIECE

## (undated) DEVICE — GOWN,SIRUS,NONRNF,SETINSLV,XL,20/CS: Brand: MEDLINE

## (undated) DEVICE — SPLINT ARMBRD W3XL10.5IN POLYFOAM DLX A LN

## (undated) DEVICE — SUTURE PERMA-HAND SZ 3-0 L30IN NONABSORBABLE BLK L60MM KS 622H

## (undated) DEVICE — TUBING PRSS 36 M F

## (undated) DEVICE — DRAIN SURG SGL COLL PT TB FOR ATS BG OASIS

## (undated) DEVICE — CAUTERY TIP POLISHER: Brand: DEVON

## (undated) DEVICE — EXTENSION SET 20 IN 3 CC PINCH CLMP 2 PC M LL STRL

## (undated) DEVICE — DRESSING GERM DIA1IN CNTR H DIA7MM BLU CHG ANTIMIC PROTCT

## (undated) DEVICE — GLOVE ORANGE PI 7   MSG9070

## (undated) DEVICE — 3M™ TEGADERM™ TRANSPARENT FILM DRESSING FRAME STYLE, 1626W, 4 IN X 4-3/4 IN (10 CM X 12 CM), 50/CT 4CT/CASE: Brand: 3M™ TEGADERM™

## (undated) DEVICE — BASIC SINGLE BASIN BTC-LF: Brand: MEDLINE INDUSTRIES, INC.

## (undated) DEVICE — PEN ABLAT ELECTRD L30MM BPLR LIN COOLRAIL

## (undated) DEVICE — PROBE TEMP AD 12FR DISP FOR ESOPHAGEAL/RECTAL MON

## (undated) DEVICE — BLADE LARYNSCP SZ 3 ENH DIR INTUB GLIDESCOPE MCGRATH MAC

## (undated) DEVICE — Device

## (undated) DEVICE — TROCAR ENDOSCP L100MM DIA5MM BLDELSS STBL SL THRD OPT VW

## (undated) DEVICE — STRIP,CLOSURE,WOUND,MEDI-STRIP,1/2X4: Brand: MEDLINE

## (undated) DEVICE — DEVICE SUT SHFT L34CM DIA 10MM 2 JAW LD UNIT ENDOSTCH

## (undated) DEVICE — DEVICE SUT 0 L48IN GRN POLY BRAID LD UNIT DISP SURGDAC

## (undated) DEVICE — LINER SUCT CANSTR 1500CC SEMI RIG W/ POR HYDROPHOBIC SHUT

## (undated) DEVICE — SKIN AFFIX SURG ADHESIVE 72/CS 0.55ML: Brand: MEDLINE

## (undated) DEVICE — KIT SHTH INTRO 9FR L10CM PERC INTEGR HEMSTAS VLV SIDEPRT

## (undated) DEVICE — SOLUTION IV IRRIG WATER 1000ML POUR BRL 2F7114

## (undated) DEVICE — 3M™ TEGADERM™ TRANSPARENT FILM DRESSING FRAME STYLE, 1624W, 2-3/8 IN X 2-3/4 IN (6 CM X 7 CM), 100/CT 4CT/CASE: Brand: 3M™ TEGADERM™

## (undated) DEVICE — CLAMP ISOLATOR RF LT

## (undated) DEVICE — SOLUTION IV 1000ML 0.9% SOD CHL PH 5 INJ USP VIAFLX PLAS

## (undated) DEVICE — PACK,UNIVERSAL,NO GOWNS: Brand: MEDLINE

## (undated) DEVICE — EXCEL 10FT (3.05 M) INSUFFLATION TUBING SET WITH 0.1 MICRON FILTER: Brand: EXCEL

## (undated) DEVICE — SOLUTION IV 1000ML LAC RINGERS PH 6.5 INJ USP VIAFLX PLAS

## (undated) DEVICE — Y-TYPE BLOOD/SOLUTION SET WITH STANDARD BLOOD FILTER, 170 TO 260 MICRON FILTER, INJECTION SITE, MALE LUER LOCK ADAPTER WITH RETRACTABLE COLLAR: Brand: INTERLINK

## (undated) DEVICE — COVER XR CASS W21XL40IN UNIV ADH MICROSHIELD

## (undated) DEVICE — 3M™ STERI-DRAPE™ INSTRUMENT POUCH 1018: Brand: STERI-DRAPE™

## (undated) DEVICE — Z INACTIVE USE 2540311 LEAD PACE L475MM CHN A OR V MYOCARDIAL STEROID ELUT SIL

## (undated) DEVICE — GLOVE SURG SZ 65 L12IN THK75MIL DK GRN LTX FREE

## (undated) DEVICE — 3M™ IOBAN™ 2 ANTIMICROBIAL INCISE DRAPE 6651EZ: Brand: IOBAN™ 2

## (undated) DEVICE — COVER US PRB W5XL96IN LTX W/ GEL

## (undated) DEVICE — SOLUTION IV 500ML 0.9% SOD CHL PH 5 INJ USP VIAFLX PLAS

## (undated) DEVICE — INTRODUCER CATH L3.5IN DIA7.5FR FOR CHOLANGIOGRAPHY TAUT

## (undated) DEVICE — SUTURE TEMP PACE WIRE SZ 2-0 L24IN NONABSORBABLE LIGHT/DARK

## (undated) DEVICE — JP CHANNEL DRAIN, 24FR HUBLESS: Brand: CARDINAL HEALTH

## (undated) DEVICE — Z CONVERTED USE 2715898 SWABSTICK MEDICATED W1.75XL6.5IN 1.6ML 3.15% CHG 70% ISO

## (undated) DEVICE — CHLORAPREP 26ML ORANGE

## (undated) DEVICE — CLAMP ISOLATOR RF RT

## (undated) DEVICE — PRESSURE MONITORING SET: Brand: TRUWAVE, VAMP PLUS

## (undated) DEVICE — GLOVE SURG SZ 65 THK91MIL LTX FREE SYN POLYISOPRENE

## (undated) DEVICE — 3M™ TEGADERM™ TRANSPARENT FILM DRESSING FRAME STYLE, 1627, 4 IN X 10 IN (10 CM X 25 CM), 20/CT 4CT/CASE: Brand: 3M™ TEGADERM™

## (undated) DEVICE — GLOVE ORANGE PI 7 1/2   MSG9075

## (undated) DEVICE — CATHETER INFUSION SINGLE LUMEN 7 FRX6 IN SLIC

## (undated) DEVICE — BAG SPEC REM 224ML W4XL6IN DIA10MM 1 HND GYN DISP ENDOPCH

## (undated) DEVICE — RETRACTOR ENDOSCP SHFT L31MM DIA10MM TEAL ATRAUM ARTC 5

## (undated) DEVICE — DISSECTOR LAP DIA5MM BLNT TIP ENDOPATH

## (undated) DEVICE — SPONGE LAP W18XL18IN WHT COT 4 PLY FLD STRUNG RADPQ DISP ST

## (undated) DEVICE — SPONGE DRN W4XL4IN RAYON/POLYESTER 6 PLY NONWOVEN PRECUT

## (undated) DEVICE — INTENDED FOR TISSUE SEPARATION, AND OTHER PROCEDURES THAT REQUIRE A SHARP SURGICAL BLADE TO PUNCTURE OR CUT.: Brand: BARD-PARKER ® CARBON RIB-BACK BLADES

## (undated) DEVICE — DRAIN,WOUND,ROUND,24FR,5/16",FULL-FLUTED: Brand: MEDLINE

## (undated) DEVICE — PROBE CRYOABLATION L10CM ALUM SMOOTH MAL RETRCT HND FLX TB

## (undated) DEVICE — PACK PROCEDURE SURG SET UP SRMC

## (undated) DEVICE — TROCAR ENDOSCP BLDELSS 12X100 MM W/ HNDL STBL SL OPT TIP

## (undated) DEVICE — SET CATH 20GA L1.5IN RAD ART POLYUR RADPQ W/ INTEGR 0.018IN

## (undated) DEVICE — CORE TRUMPET FOR SINGLE SOLUTION BAG: Brand: CORE DYNAMICS